# Patient Record
Sex: MALE | Race: WHITE | Employment: OTHER | URBAN - NONMETROPOLITAN AREA
[De-identification: names, ages, dates, MRNs, and addresses within clinical notes are randomized per-mention and may not be internally consistent; named-entity substitution may affect disease eponyms.]

---

## 2017-03-08 ENCOUNTER — OFFICE VISIT (OUTPATIENT)
Dept: GASTROENTEROLOGY | Age: 52
End: 2017-03-08
Payer: MEDICARE

## 2017-03-08 VITALS
DIASTOLIC BLOOD PRESSURE: 70 MMHG | HEIGHT: 69 IN | BODY MASS INDEX: 25.03 KG/M2 | OXYGEN SATURATION: 98 % | HEART RATE: 76 BPM | WEIGHT: 169 LBS | SYSTOLIC BLOOD PRESSURE: 110 MMHG

## 2017-03-08 DIAGNOSIS — R07.89 BURNING CHEST PAIN: Primary | ICD-10-CM

## 2017-03-08 DIAGNOSIS — Z86.19 HISTORY OF HELICOBACTER PYLORI INFECTION: ICD-10-CM

## 2017-03-08 DIAGNOSIS — R12 HEARTBURN: ICD-10-CM

## 2017-03-08 PROCEDURE — 99214 OFFICE O/P EST MOD 30 MIN: CPT | Performed by: NURSE PRACTITIONER

## 2017-03-08 PROCEDURE — G8427 DOCREV CUR MEDS BY ELIG CLIN: HCPCS | Performed by: NURSE PRACTITIONER

## 2017-03-08 PROCEDURE — G8420 CALC BMI NORM PARAMETERS: HCPCS | Performed by: NURSE PRACTITIONER

## 2017-03-08 PROCEDURE — G8484 FLU IMMUNIZE NO ADMIN: HCPCS | Performed by: NURSE PRACTITIONER

## 2017-03-08 PROCEDURE — 1036F TOBACCO NON-USER: CPT | Performed by: NURSE PRACTITIONER

## 2017-03-08 RX ORDER — LEVOTHYROXINE SODIUM 0.05 MG/1
50 TABLET ORAL DAILY
COMMUNITY
End: 2018-06-15 | Stop reason: CLARIF

## 2017-03-08 RX ORDER — AMITRIPTYLINE HYDROCHLORIDE 10 MG/1
10 TABLET, FILM COATED ORAL NIGHTLY
COMMUNITY
End: 2018-06-15 | Stop reason: CLARIF

## 2017-03-08 RX ORDER — METOPROLOL SUCCINATE 50 MG/1
50 TABLET, EXTENDED RELEASE ORAL DAILY
COMMUNITY

## 2017-03-08 RX ORDER — GABAPENTIN 100 MG/1
100 CAPSULE ORAL 2 TIMES DAILY
COMMUNITY
End: 2018-06-15 | Stop reason: CLARIF

## 2017-03-08 ASSESSMENT — ENCOUNTER SYMPTOMS
COUGH: 0
SHORTNESS OF BREATH: 0
EYES NEGATIVE: 1
BACK PAIN: 0
DIARRHEA: 0
VOICE CHANGE: 0
CONSTIPATION: 1
CHEST TIGHTNESS: 0
ALLERGIC/IMMUNOLOGIC NEGATIVE: 1
NAUSEA: 0
RECTAL PAIN: 0
ABDOMINAL PAIN: 0
SORE THROAT: 0
VOMITING: 0
BLOOD IN STOOL: 0

## 2017-03-28 ENCOUNTER — ANESTHESIA (OUTPATIENT)
Dept: ENDOSCOPY | Age: 52
End: 2017-03-28
Payer: MEDICARE

## 2017-03-28 ENCOUNTER — ANESTHESIA EVENT (OUTPATIENT)
Dept: ENDOSCOPY | Age: 52
End: 2017-03-28
Payer: MEDICARE

## 2017-03-28 ENCOUNTER — HOSPITAL ENCOUNTER (OUTPATIENT)
Age: 52
Setting detail: OUTPATIENT SURGERY
Discharge: HOME | End: 2017-03-28
Attending: INTERNAL MEDICINE | Admitting: INTERNAL MEDICINE
Payer: MEDICARE

## 2017-03-28 VITALS
HEART RATE: 69 BPM | BODY MASS INDEX: 23.7 KG/M2 | SYSTOLIC BLOOD PRESSURE: 127 MMHG | WEIGHT: 160 LBS | HEIGHT: 69 IN | OXYGEN SATURATION: 100 % | TEMPERATURE: 97.2 F | DIASTOLIC BLOOD PRESSURE: 84 MMHG | RESPIRATION RATE: 16 BRPM

## 2017-03-28 VITALS
OXYGEN SATURATION: 96 % | RESPIRATION RATE: 19 BRPM | DIASTOLIC BLOOD PRESSURE: 84 MMHG | SYSTOLIC BLOOD PRESSURE: 118 MMHG

## 2017-03-28 PROCEDURE — 87077 CULTURE AEROBIC IDENTIFY: CPT

## 2017-03-28 PROCEDURE — 7100000011 HC PHASE II RECOVERY - ADDTL 15 MIN: Performed by: INTERNAL MEDICINE

## 2017-03-28 PROCEDURE — 2580000003 HC RX 258: Performed by: INTERNAL MEDICINE

## 2017-03-28 PROCEDURE — 6360000002 HC RX W HCPCS: Performed by: NURSE ANESTHETIST, CERTIFIED REGISTERED

## 2017-03-28 PROCEDURE — 2500000003 HC RX 250 WO HCPCS: Performed by: NURSE ANESTHETIST, CERTIFIED REGISTERED

## 2017-03-28 PROCEDURE — 7100000010 HC PHASE II RECOVERY - FIRST 15 MIN: Performed by: INTERNAL MEDICINE

## 2017-03-28 PROCEDURE — 3700000000 HC ANESTHESIA ATTENDED CARE: Performed by: INTERNAL MEDICINE

## 2017-03-28 PROCEDURE — 43239 EGD BIOPSY SINGLE/MULTIPLE: CPT | Performed by: INTERNAL MEDICINE

## 2017-03-28 PROCEDURE — 2500000003 HC RX 250 WO HCPCS: Performed by: INTERNAL MEDICINE

## 2017-03-28 PROCEDURE — 3609012400 HC EGD TRANSORAL BIOPSY SINGLE/MULTIPLE: Performed by: INTERNAL MEDICINE

## 2017-03-28 RX ORDER — SODIUM CHLORIDE, SODIUM LACTATE, POTASSIUM CHLORIDE, CALCIUM CHLORIDE 600; 310; 30; 20 MG/100ML; MG/100ML; MG/100ML; MG/100ML
INJECTION, SOLUTION INTRAVENOUS CONTINUOUS
Status: DISCONTINUED | OUTPATIENT
Start: 2017-03-28 | End: 2017-03-28 | Stop reason: HOSPADM

## 2017-03-28 RX ORDER — LIDOCAINE HYDROCHLORIDE 10 MG/ML
1 INJECTION, SOLUTION EPIDURAL; INFILTRATION; INTRACAUDAL; PERINEURAL ONCE
Status: COMPLETED | OUTPATIENT
Start: 2017-03-28 | End: 2017-03-28

## 2017-03-28 RX ORDER — ONDANSETRON 2 MG/ML
4 INJECTION INTRAMUSCULAR; INTRAVENOUS
Status: DISCONTINUED | OUTPATIENT
Start: 2017-03-28 | End: 2017-03-28 | Stop reason: HOSPADM

## 2017-03-28 RX ORDER — ESOMEPRAZOLE MAGNESIUM 40 MG/1
40 CAPSULE, DELAYED RELEASE ORAL
Qty: 30 CAPSULE | Refills: 5 | Status: SHIPPED | OUTPATIENT
Start: 2017-03-28 | End: 2018-06-15 | Stop reason: CLARIF

## 2017-03-28 RX ORDER — PROPOFOL 10 MG/ML
INJECTION, EMULSION INTRAVENOUS PRN
Status: DISCONTINUED | OUTPATIENT
Start: 2017-03-28 | End: 2017-03-28 | Stop reason: SDUPTHER

## 2017-03-28 RX ORDER — DIPHENHYDRAMINE HYDROCHLORIDE 50 MG/ML
12.5 INJECTION INTRAMUSCULAR; INTRAVENOUS
Status: DISCONTINUED | OUTPATIENT
Start: 2017-03-28 | End: 2017-03-28 | Stop reason: HOSPADM

## 2017-03-28 RX ORDER — PROMETHAZINE HYDROCHLORIDE 25 MG/ML
6.25 INJECTION, SOLUTION INTRAMUSCULAR; INTRAVENOUS
Status: DISCONTINUED | OUTPATIENT
Start: 2017-03-28 | End: 2017-03-28 | Stop reason: HOSPADM

## 2017-03-28 RX ORDER — LIDOCAINE HYDROCHLORIDE 10 MG/ML
INJECTION, SOLUTION EPIDURAL; INFILTRATION; INTRACAUDAL; PERINEURAL PRN
Status: DISCONTINUED | OUTPATIENT
Start: 2017-03-28 | End: 2017-03-28 | Stop reason: SDUPTHER

## 2017-03-28 RX ORDER — MIDAZOLAM HYDROCHLORIDE 1 MG/ML
INJECTION INTRAMUSCULAR; INTRAVENOUS PRN
Status: DISCONTINUED | OUTPATIENT
Start: 2017-03-28 | End: 2017-03-28 | Stop reason: SDUPTHER

## 2017-03-28 RX ADMIN — LIDOCAINE HYDROCHLORIDE 1 ML: 10 INJECTION, SOLUTION EPIDURAL; INFILTRATION; INTRACAUDAL; PERINEURAL at 07:45

## 2017-03-28 RX ADMIN — MIDAZOLAM HYDROCHLORIDE 2 MG: 1 INJECTION, SOLUTION INTRAMUSCULAR; INTRAVENOUS at 09:28

## 2017-03-28 RX ADMIN — SODIUM CHLORIDE, POTASSIUM CHLORIDE, SODIUM LACTATE AND CALCIUM CHLORIDE: 600; 310; 30; 20 INJECTION, SOLUTION INTRAVENOUS at 07:44

## 2017-03-28 RX ADMIN — PROPOFOL 160 MG: 10 INJECTION, EMULSION INTRAVENOUS at 09:29

## 2017-03-28 RX ADMIN — LIDOCAINE HYDROCHLORIDE 5 ML: 10 INJECTION, SOLUTION EPIDURAL; INFILTRATION; INTRACAUDAL; PERINEURAL at 09:29

## 2017-03-29 LAB — CLOTEST: NEGATIVE

## 2017-03-31 ENCOUNTER — TELEPHONE (OUTPATIENT)
Dept: GASTROENTEROLOGY | Age: 52
End: 2017-03-31

## 2017-10-06 ENCOUNTER — TRANSCRIBE ORDERS (OUTPATIENT)
Dept: ADMINISTRATIVE | Facility: HOSPITAL | Age: 52
End: 2017-10-06

## 2017-10-06 DIAGNOSIS — R10.12 ABDOMINAL PAIN, LEFT UPPER QUADRANT: Primary | ICD-10-CM

## 2017-10-12 ENCOUNTER — APPOINTMENT (OUTPATIENT)
Dept: CT IMAGING | Facility: HOSPITAL | Age: 52
End: 2017-10-12

## 2017-10-12 ENCOUNTER — TRANSCRIBE ORDERS (OUTPATIENT)
Dept: ADMINISTRATIVE | Facility: HOSPITAL | Age: 52
End: 2017-10-12

## 2017-10-12 DIAGNOSIS — R10.12 ABDOMINAL PAIN, LEFT UPPER QUADRANT: Primary | ICD-10-CM

## 2017-10-13 ENCOUNTER — HOSPITAL ENCOUNTER (OUTPATIENT)
Dept: ULTRASOUND IMAGING | Facility: HOSPITAL | Age: 52
Discharge: HOME | End: 2017-10-13
Admitting: FAMILY MEDICINE

## 2017-10-13 DIAGNOSIS — R10.12 ABDOMINAL PAIN, LEFT UPPER QUADRANT: ICD-10-CM

## 2017-10-13 PROCEDURE — 76700 US EXAM ABDOM COMPLETE: CPT

## 2017-11-27 ENCOUNTER — TRANSCRIBE ORDERS (OUTPATIENT)
Dept: ADMINISTRATIVE | Facility: HOSPITAL | Age: 52
End: 2017-11-27

## 2017-11-27 ENCOUNTER — HOSPITAL ENCOUNTER (OUTPATIENT)
Dept: GENERAL RADIOLOGY | Facility: HOSPITAL | Age: 52
Discharge: HOME | End: 2017-11-27
Admitting: NURSE PRACTITIONER

## 2017-11-27 ENCOUNTER — HOSPITAL ENCOUNTER (OUTPATIENT)
Dept: MAMMOGRAPHY | Facility: HOSPITAL | Age: 52
Discharge: HOME | End: 2017-11-27

## 2017-11-27 ENCOUNTER — LAB (OUTPATIENT)
Dept: LAB | Facility: HOSPITAL | Age: 52
End: 2017-11-27

## 2017-11-27 ENCOUNTER — HOSPITAL ENCOUNTER (OUTPATIENT)
Dept: ULTRASOUND IMAGING | Facility: HOSPITAL | Age: 52
Discharge: HOME | End: 2017-11-27

## 2017-11-27 DIAGNOSIS — R07.89 OTHER CHEST PAIN: Primary | ICD-10-CM

## 2017-11-27 DIAGNOSIS — R07.89 OTHER CHEST PAIN: ICD-10-CM

## 2017-11-27 DIAGNOSIS — N63.0 BREAST LUMP OR MASS: ICD-10-CM

## 2017-11-27 DIAGNOSIS — N63.0 BREAST LUMP OR MASS: Primary | ICD-10-CM

## 2017-11-27 LAB
ALBUMIN SERPL-MCNC: 4.4 G/DL (ref 3.5–5)
ALBUMIN/GLOB SERPL: 1.4 G/DL (ref 1.1–2.5)
ALP SERPL-CCNC: 30 U/L (ref 24–120)
ALT SERPL W P-5'-P-CCNC: 40 U/L (ref 0–54)
ANION GAP SERPL CALCULATED.3IONS-SCNC: 11 MMOL/L (ref 4–13)
AST SERPL-CCNC: 30 U/L (ref 7–45)
AUTO MIXED CELLS #: 0.5 10*3/MM3 (ref 0.1–2.6)
AUTO MIXED CELLS %: 9.2 % (ref 0.1–24)
BILIRUB SERPL-MCNC: 0.3 MG/DL (ref 0.1–1)
BUN BLD-MCNC: 15 MG/DL (ref 5–21)
BUN/CREAT SERPL: 13.8
CALCIUM SPEC-SCNC: 10.1 MG/DL (ref 8.4–10.4)
CHLORIDE SERPL-SCNC: 100 MMOL/L (ref 98–110)
CHOLEST SERPL-MCNC: 170 MG/DL (ref 130–200)
CO2 SERPL-SCNC: 31 MMOL/L (ref 24–31)
CREAT BLD-MCNC: 1.09 MG/DL (ref 0.5–1.4)
CRP SERPL-MCNC: <0.5 MG/DL (ref 0–0.99)
ERYTHROCYTE [DISTWIDTH] IN BLOOD BY AUTOMATED COUNT: 12.5 % (ref 12–15)
GFR SERPL CREATININE-BSD FRML MDRD: 72 ML/MIN/1.73
GLOBULIN UR ELPH-MCNC: 3.1 GM/DL
GLUCOSE BLD-MCNC: 91 MG/DL (ref 70–100)
HCT VFR BLD AUTO: 42.3 % (ref 40–52)
HDLC SERPL-MCNC: 55 MG/DL
HGB BLD-MCNC: 14.4 G/DL (ref 14–18)
LDLC SERPL CALC-MCNC: 84 MG/DL (ref 0–99)
LDLC/HDLC SERPL: 1.52 {RATIO}
LYMPHOCYTES # BLD AUTO: 1.7 10*3/MM3 (ref 0.8–7)
LYMPHOCYTES NFR BLD AUTO: 30.4 % (ref 15–45)
MCH RBC QN AUTO: 29.7 PG (ref 28–32)
MCHC RBC AUTO-ENTMCNC: 34 G/DL (ref 33–36)
MCV RBC AUTO: 87.2 FL (ref 82–95)
NEUTROPHILS # BLD AUTO: 3.5 10*3/MM3 (ref 1.5–8.3)
NEUTROPHILS NFR BLD AUTO: 60.4 % (ref 39–78)
PLATELET # BLD AUTO: 269 10*3/MM3 (ref 130–400)
PMV BLD AUTO: 8.6 FL (ref 6–12)
POTASSIUM BLD-SCNC: 5.2 MMOL/L (ref 3.5–5.3)
PROT SERPL-MCNC: 7.5 G/DL (ref 6.3–8.7)
RBC # BLD AUTO: 4.85 10*6/MM3 (ref 4.2–5.4)
SODIUM BLD-SCNC: 142 MMOL/L (ref 135–145)
TRIGL SERPL-MCNC: 157 MG/DL (ref 0–149)
TROPONIN I SERPL-MCNC: <0.012 NG/ML (ref 0–0.03)
VLDLC SERPL-MCNC: 31.4 MG/DL
WBC NRBC COR # BLD: 5.7 10*3/MM3 (ref 4.8–10.8)

## 2017-11-27 PROCEDURE — G0204 DX MAMMO INCL CAD BI: HCPCS

## 2017-11-27 PROCEDURE — 84484 ASSAY OF TROPONIN QUANT: CPT | Performed by: NURSE PRACTITIONER

## 2017-11-27 PROCEDURE — 71020 HC CHEST PA AND LATERAL: CPT

## 2017-11-27 PROCEDURE — 80053 COMPREHEN METABOLIC PANEL: CPT | Performed by: NURSE PRACTITIONER

## 2017-11-27 PROCEDURE — 36415 COLL VENOUS BLD VENIPUNCTURE: CPT

## 2017-11-27 PROCEDURE — 80061 LIPID PANEL: CPT

## 2017-11-27 PROCEDURE — G0279 TOMOSYNTHESIS, MAMMO: HCPCS

## 2017-11-27 PROCEDURE — 85025 COMPLETE CBC W/AUTO DIFF WBC: CPT | Performed by: NURSE PRACTITIONER

## 2017-11-27 PROCEDURE — 86140 C-REACTIVE PROTEIN: CPT | Performed by: NURSE PRACTITIONER

## 2017-11-28 ENCOUNTER — TRANSCRIBE ORDERS (OUTPATIENT)
Dept: ADMINISTRATIVE | Facility: HOSPITAL | Age: 52
End: 2017-11-28

## 2017-11-28 ENCOUNTER — TRANSCRIBE ORDERS (OUTPATIENT)
Dept: GENERAL RADIOLOGY | Facility: HOSPITAL | Age: 52
End: 2017-11-28

## 2017-11-28 DIAGNOSIS — R07.89 OTHER CHEST PAIN: Primary | ICD-10-CM

## 2017-11-30 ENCOUNTER — HOSPITAL ENCOUNTER (OUTPATIENT)
Dept: CARDIOLOGY | Facility: HOSPITAL | Age: 52
Discharge: HOME | End: 2017-11-30
Attending: PEDIATRICS | Admitting: NURSE PRACTITIONER

## 2017-11-30 DIAGNOSIS — R07.89 OTHER CHEST PAIN: ICD-10-CM

## 2017-11-30 LAB
BH CV STRESS BP STAGE 1: NORMAL
BH CV STRESS BP STAGE 2: NORMAL
BH CV STRESS BP STAGE 3: NORMAL
BH CV STRESS BP STAGE 4: NORMAL
BH CV STRESS DURATION MIN STAGE 1: 3
BH CV STRESS DURATION MIN STAGE 2: 3
BH CV STRESS DURATION MIN STAGE 3: 3
BH CV STRESS DURATION MIN STAGE 4: 0
BH CV STRESS DURATION SEC STAGE 1: 0
BH CV STRESS DURATION SEC STAGE 2: 0
BH CV STRESS DURATION SEC STAGE 3: 0
BH CV STRESS DURATION SEC STAGE 4: 38
BH CV STRESS GRADE STAGE 1: 10
BH CV STRESS GRADE STAGE 2: 12
BH CV STRESS GRADE STAGE 3: 14
BH CV STRESS GRADE STAGE 4: 16
BH CV STRESS HR STAGE 1: 98
BH CV STRESS HR STAGE 2: 112
BH CV STRESS HR STAGE 3: 133
BH CV STRESS HR STAGE 4: 162
BH CV STRESS METS STAGE 1: 5
BH CV STRESS METS STAGE 2: 7.5
BH CV STRESS METS STAGE 3: 10
BH CV STRESS METS STAGE 4: 13.5
BH CV STRESS PROTOCOL 1: NORMAL
BH CV STRESS RECOVERY BP: NORMAL MMHG
BH CV STRESS RECOVERY HR: 76 BPM
BH CV STRESS SPEED STAGE 1: 1.7
BH CV STRESS SPEED STAGE 2: 2.5
BH CV STRESS SPEED STAGE 3: 3.4
BH CV STRESS SPEED STAGE 4: 4.2
BH CV STRESS STAGE 1: 1
BH CV STRESS STAGE 2: 2
BH CV STRESS STAGE 3: 3
BH CV STRESS STAGE 4: 4
MAXIMAL PREDICTED HEART RATE: 170 BPM
PERCENT MAX PREDICTED HR: 95.29 %
STRESS BASELINE BP: NORMAL MMHG
STRESS BASELINE HR: 64 BPM
STRESS PERCENT HR: 112 %
STRESS POST ESTIMATED WORKLOAD: 13.5 METS
STRESS POST EXERCISE DUR MIN: 9 MIN
STRESS POST EXERCISE DUR SEC: 38 SEC
STRESS POST PEAK BP: NORMAL MMHG
STRESS POST PEAK HR: 162 BPM
STRESS TARGET HR: 145 BPM

## 2017-11-30 PROCEDURE — 93017 CV STRESS TEST TRACING ONLY: CPT

## 2017-11-30 PROCEDURE — 93018 CV STRESS TEST I&R ONLY: CPT | Performed by: INTERNAL MEDICINE

## 2018-02-24 ENCOUNTER — TELEPHONE (OUTPATIENT)
Dept: PRIMARY CARE CLINIC | Age: 53
End: 2018-02-24

## 2018-03-20 ENCOUNTER — TELEPHONE (OUTPATIENT)
Dept: UROLOGY | Facility: CLINIC | Age: 53
End: 2018-03-20

## 2018-03-20 DIAGNOSIS — N20.0 KIDNEY STONE: Primary | ICD-10-CM

## 2018-03-21 NOTE — PROGRESS NOTES
Subjective    Mr. iSmms is 52 y.o. male    Chief Complaint: Renal Cyst/Kidney Stone    History of Present Illness  Urolithiasis  Patient complains of left abdominal pain with and without radiation to the abdomen. Onset of symptoms was gradual starting several months ago with unchanged course since that time. Patient describes the pain as aching, occasionally and rated as mild. The patient has had no nausea and no vomiting. There has been no fever or chills. The patient is not complaining of dysuria, frequency, or urgency.  Previous management of stones includes none      The following portions of the patient's history were reviewed and updated as appropriate: allergies, current medications, past family history, past medical history, past social history, past surgical history and problem list.    Review of Systems   Constitutional: Negative for chills and fever.   Gastrointestinal: Negative for abdominal pain, anal bleeding and blood in stool.   Genitourinary: Negative for decreased urine volume, difficulty urinating, discharge, dysuria, enuresis, flank pain, frequency, genital sores, hematuria, penile pain, penile swelling, scrotal swelling, testicular pain and urgency.         Current Outpatient Prescriptions:   •  fenofibrate (TRICOR) 145 MG tablet, Take 145 mg by mouth Daily., Disp: , Rfl:   •  metoprolol tartrate (LOPRESSOR) 100 MG tablet, Take 100 mg by mouth 2 (Two) Times a Day., Disp: , Rfl:   •  Omega-3 Fatty Acids (FISH OIL) 1000 MG capsule capsule, Take  by mouth Daily With Breakfast., Disp: , Rfl:   •  pantoprazole (PROTONIX) 40 MG EC tablet, Take 40 mg by mouth Daily., Disp: , Rfl:     Past Medical History:   Diagnosis Date   • Hypertension        Past Surgical History:   Procedure Laterality Date   • GALLBLADDER SURGERY     • TONSILLECTOMY         Social History     Social History   • Marital status: Single     Social History Main Topics   • Smoking status: Former Smoker   • Smokeless tobacco: Never  "Used   • Alcohol use Defer   • Drug use: Unknown   • Sexual activity: Defer     Other Topics Concern   • Not on file       Family History   Problem Relation Age of Onset   • No Known Problems Mother    • No Known Problems Father    • No Known Problems Sister    • No Known Problems Brother    • No Known Problems Daughter    • No Known Problems Son    • Heart disease Maternal Grandmother    • No Known Problems Paternal Grandmother    • No Known Problems Maternal Aunt    • No Known Problems Paternal Aunt    • BRCA 1/2 Neg Hx    • Breast cancer Neg Hx    • Colon cancer Neg Hx    • Endometrial cancer Neg Hx    • Ovarian cancer Neg Hx        Objective    Temp 97.8 °F (36.6 °C)   Ht 175.3 cm (69\")   Wt 76.7 kg (169 lb)   BMI 24.96 kg/m²     Physical Exam   Constitutional: He is oriented to person, place, and time. He appears well-developed and well-nourished. No distress.   Pulmonary/Chest: Effort normal.   Abdominal: Soft. He exhibits no distension and no mass. There is no tenderness. There is no rebound and no guarding. No hernia.   Neurological: He is alert and oriented to person, place, and time.   Skin: Skin is warm and dry. He is not diaphoretic.   Psychiatric: He has a normal mood and affect.   Vitals reviewed.          Results for orders placed or performed in visit on 03/23/18   POC Urinalysis Dipstick, Automated   Result Value Ref Range    Color Yellow Yellow, Straw, Dark Yellow, Anai    Clarity, UA Clear Clear    Glucose, UA Negative Negative, 1000 mg/dL (3+) mg/dL    Bilirubin Negative Negative    Ketones, UA Negative Negative    Specific Gravity  1.025 1.005 - 1.030    Blood, UA Negative Negative    pH, Urine 5.5 5.0 - 8.0    Protein, POC Negative Negative mg/dL    Urobilinogen, UA Normal Normal    Leukocytes Negative Negative    Nitrite, UA Negative Negative   KUB independent review    A KUB is available for me to review today.  The image is inspected for a bowel gas pattern and the general bone structure " of the spine and pelvis. The kidneys are then inspected closely.  Renal outline is noted if identifiable. The kidney, collecting system, and anticipated path of the ureter are examined for calcifications including those in the true pelvis.  This film reveals:    On the right there are single calcification right upper pole.    On the left there are no calcificaitons seen in the kidney or the expected course of the ureter. .    Renal ultrasound independent review    The renal ultrasound is available for me to review.  Treatment recommendations require an independent review.  This film has been reviewed by the radiologist to determine any non urologic abnormalities that are presents.  However, I very closely inspected the kidneys for size, symmetry, contour, parenchymal thickness, perinephric reaction, presence of calcifications, and intrarenal dilation of the collecting system.       The right kidney appears normal on this ultrasound.  The renal parenchymal is norml in thickness.  There are no solid masses or cysts.  There is no hydronephrosis.  There are no stones.      The left kidney appears simple renal cyst    The bladder appears normal on thisultrsaound.  The bladder appears normal in thickness.  There no masses or stones seen on this exam.         Assessment and Plan    Diagnoses and all orders for this visit:    Renal cyst  -     POC Urinalysis Dipstick, Automated    Kidney stone  -     POC Urinalysis Dipstick, Automated          Patient was last seen in August 2015 at that point he had a right 4 mm nonobstructing stone.  I personally reviewed his KUB today I see stable stone disease.  In addition I reviewed his renal ultrasound which shows no hydronephrosis on the right stone is not visible on the ultrasound.  He does have a simple renal cyst on the left which requires no further follow-up.    He is going to follow-up with me in 1 year with KUB if he does have interval growth and we will discuss potential for  ESWL.

## 2018-03-23 ENCOUNTER — HOSPITAL ENCOUNTER (OUTPATIENT)
Dept: GENERAL RADIOLOGY | Facility: HOSPITAL | Age: 53
Discharge: HOME | End: 2018-03-23
Attending: UROLOGY | Admitting: UROLOGY

## 2018-03-23 ENCOUNTER — OFFICE VISIT (OUTPATIENT)
Dept: UROLOGY | Facility: CLINIC | Age: 53
End: 2018-03-23

## 2018-03-23 VITALS — TEMPERATURE: 97.8 F | WEIGHT: 169 LBS | HEIGHT: 69 IN | BODY MASS INDEX: 25.03 KG/M2

## 2018-03-23 DIAGNOSIS — N20.0 NEPHROLITHIASIS: ICD-10-CM

## 2018-03-23 DIAGNOSIS — N28.1 RENAL CYST: Primary | ICD-10-CM

## 2018-03-23 LAB
BILIRUB BLD-MCNC: NEGATIVE MG/DL
CLARITY, POC: CLEAR
COLOR UR: YELLOW
GLUCOSE UR STRIP-MCNC: NEGATIVE MG/DL
KETONES UR QL: NEGATIVE
LEUKOCYTE EST, POC: NEGATIVE
NITRITE UR-MCNC: NEGATIVE MG/ML
PH UR: 5.5 [PH] (ref 5–8)
PROT UR STRIP-MCNC: NEGATIVE MG/DL
RBC # UR STRIP: NEGATIVE /UL
SP GR UR: 1.02 (ref 1–1.03)
UROBILINOGEN UR QL: NORMAL

## 2018-03-23 PROCEDURE — 81001 URINALYSIS AUTO W/SCOPE: CPT | Performed by: UROLOGY

## 2018-03-23 PROCEDURE — 74018 RADEX ABDOMEN 1 VIEW: CPT

## 2018-03-23 PROCEDURE — 99213 OFFICE O/P EST LOW 20 MIN: CPT | Performed by: UROLOGY

## 2018-03-23 RX ORDER — FENOFIBRATE 160 MG/1
160 TABLET ORAL NIGHTLY
COMMUNITY

## 2018-03-23 RX ORDER — METOPROLOL TARTRATE 100 MG/1
100 TABLET ORAL DAILY
Status: ON HOLD | COMMUNITY
End: 2021-11-15

## 2018-03-23 RX ORDER — PANTOPRAZOLE SODIUM 40 MG/1
40 TABLET, DELAYED RELEASE ORAL NIGHTLY
COMMUNITY
End: 2023-01-04

## 2018-03-23 RX ORDER — CHLORAL HYDRATE 500 MG
CAPSULE ORAL
COMMUNITY
End: 2021-03-29

## 2018-06-13 DIAGNOSIS — Z00.00 ROUTINE GENERAL MEDICAL EXAMINATION AT A HEALTH CARE FACILITY: ICD-10-CM

## 2018-06-13 DIAGNOSIS — Z12.5 SCREENING PSA (PROSTATE SPECIFIC ANTIGEN): Primary | ICD-10-CM

## 2018-06-14 DIAGNOSIS — Z00.00 ROUTINE GENERAL MEDICAL EXAMINATION AT A HEALTH CARE FACILITY: ICD-10-CM

## 2018-06-14 DIAGNOSIS — Z12.5 SCREENING PSA (PROSTATE SPECIFIC ANTIGEN): ICD-10-CM

## 2018-06-14 LAB
ALBUMIN SERPL-MCNC: 4.6 G/DL (ref 3.5–5.2)
ALP BLD-CCNC: 30 U/L (ref 40–130)
ALT SERPL-CCNC: 17 U/L (ref 5–41)
ANION GAP SERPL CALCULATED.3IONS-SCNC: 11 MMOL/L (ref 7–19)
AST SERPL-CCNC: 22 U/L (ref 5–40)
BASOPHILS ABSOLUTE: 0.1 K/UL (ref 0–0.2)
BASOPHILS RELATIVE PERCENT: 0.9 % (ref 0–1)
BILIRUB SERPL-MCNC: 0.4 MG/DL (ref 0.2–1.2)
BUN BLDV-MCNC: 16 MG/DL (ref 6–20)
CALCIUM SERPL-MCNC: 9.7 MG/DL (ref 8.6–10)
CHLORIDE BLD-SCNC: 102 MMOL/L (ref 98–111)
CHOLESTEROL, TOTAL: 146 MG/DL (ref 160–199)
CO2: 28 MMOL/L (ref 22–29)
CREAT SERPL-MCNC: 1.2 MG/DL (ref 0.5–1.2)
EOSINOPHILS ABSOLUTE: 0.1 K/UL (ref 0–0.6)
EOSINOPHILS RELATIVE PERCENT: 1.9 % (ref 0–5)
GFR NON-AFRICAN AMERICAN: >60
GLUCOSE BLD-MCNC: 80 MG/DL (ref 74–109)
HCT VFR BLD CALC: 41.7 % (ref 42–52)
HDLC SERPL-MCNC: 47 MG/DL (ref 55–121)
HEMOGLOBIN: 14 G/DL (ref 14–18)
LDL CHOLESTEROL CALCULATED: 80 MG/DL
LYMPHOCYTES ABSOLUTE: 1.7 K/UL (ref 1.1–4.5)
LYMPHOCYTES RELATIVE PERCENT: 31.2 % (ref 20–40)
MCH RBC QN AUTO: 29.7 PG (ref 27–31)
MCHC RBC AUTO-ENTMCNC: 33.6 G/DL (ref 33–37)
MCV RBC AUTO: 88.5 FL (ref 80–94)
MONOCYTES ABSOLUTE: 0.5 K/UL (ref 0–0.9)
MONOCYTES RELATIVE PERCENT: 9 % (ref 0–10)
NEUTROPHILS ABSOLUTE: 3 K/UL (ref 1.5–7.5)
NEUTROPHILS RELATIVE PERCENT: 56.6 % (ref 50–65)
PDW BLD-RTO: 12 % (ref 11.5–14.5)
PLATELET # BLD: 244 K/UL (ref 130–400)
PMV BLD AUTO: 9.6 FL (ref 9.4–12.4)
POTASSIUM SERPL-SCNC: 3.9 MMOL/L (ref 3.5–5)
PROSTATE SPECIFIC ANTIGEN: 0.62 NG/ML (ref 0–4)
RBC # BLD: 4.71 M/UL (ref 4.7–6.1)
SODIUM BLD-SCNC: 141 MMOL/L (ref 136–145)
T4 FREE: 1.2 NG/DL (ref 0.9–1.7)
TOTAL PROTEIN: 6.8 G/DL (ref 6.6–8.7)
TRIGL SERPL-MCNC: 96 MG/DL (ref 0–149)
TSH SERPL DL<=0.05 MIU/L-ACNC: 10.49 UIU/ML (ref 0.27–4.2)
WBC # BLD: 5.4 K/UL (ref 4.8–10.8)

## 2018-07-03 ENCOUNTER — HOSPITAL ENCOUNTER (EMERGENCY)
Facility: HOSPITAL | Age: 53
Discharge: HOME | End: 2018-07-04
Attending: EMERGENCY MEDICINE | Admitting: EMERGENCY MEDICINE

## 2018-07-03 DIAGNOSIS — R10.84 GENERALIZED ABDOMINAL PAIN: Primary | ICD-10-CM

## 2018-07-03 LAB
ALBUMIN SERPL-MCNC: 4.8 G/DL (ref 3.5–5)
ALBUMIN/GLOB SERPL: 1.7 G/DL (ref 1.1–2.5)
ALP SERPL-CCNC: 31 U/L (ref 24–120)
ALT SERPL W P-5'-P-CCNC: 42 U/L (ref 0–54)
ANION GAP SERPL CALCULATED.3IONS-SCNC: 16 MMOL/L (ref 4–13)
AST SERPL-CCNC: 38 U/L (ref 7–45)
BASOPHILS # BLD AUTO: 0.03 10*3/MM3 (ref 0–0.2)
BASOPHILS NFR BLD AUTO: 0.3 % (ref 0–2)
BILIRUB SERPL-MCNC: 0.5 MG/DL (ref 0.1–1)
BUN BLD-MCNC: 26 MG/DL (ref 5–21)
BUN/CREAT SERPL: 23.6 (ref 7–25)
CALCIUM SPEC-SCNC: 9.3 MG/DL (ref 8.4–10.4)
CHLORIDE SERPL-SCNC: 100 MMOL/L (ref 98–110)
CO2 SERPL-SCNC: 27 MMOL/L (ref 24–31)
CREAT BLD-MCNC: 1.1 MG/DL (ref 0.5–1.4)
D-LACTATE SERPL-SCNC: 2.4 MMOL/L (ref 0.5–2)
DEPRECATED RDW RBC AUTO: 37.7 FL (ref 40–54)
EOSINOPHIL # BLD AUTO: 0.02 10*3/MM3 (ref 0–0.7)
EOSINOPHIL NFR BLD AUTO: 0.2 % (ref 0–4)
ERYTHROCYTE [DISTWIDTH] IN BLOOD BY AUTOMATED COUNT: 12.2 % (ref 12–15)
GFR SERPL CREATININE-BSD FRML MDRD: 71 ML/MIN/1.73
GLOBULIN UR ELPH-MCNC: 2.8 GM/DL
GLUCOSE BLD-MCNC: 122 MG/DL (ref 70–100)
HCT VFR BLD AUTO: 45.9 % (ref 40–52)
HGB BLD-MCNC: 15.6 G/DL (ref 14–18)
IMM GRANULOCYTES # BLD: 0.05 10*3/MM3 (ref 0–0.03)
IMM GRANULOCYTES NFR BLD: 0.5 % (ref 0–5)
LIPASE SERPL-CCNC: 38 U/L (ref 23–203)
LYMPHOCYTES # BLD AUTO: 0.48 10*3/MM3 (ref 0.72–4.86)
LYMPHOCYTES NFR BLD AUTO: 4.6 % (ref 15–45)
MCH RBC QN AUTO: 28.9 PG (ref 28–32)
MCHC RBC AUTO-ENTMCNC: 34 G/DL (ref 33–36)
MCV RBC AUTO: 85.2 FL (ref 82–95)
MONOCYTES # BLD AUTO: 0.54 10*3/MM3 (ref 0.19–1.3)
MONOCYTES NFR BLD AUTO: 5.1 % (ref 4–12)
NEUTROPHILS # BLD AUTO: 9.39 10*3/MM3 (ref 1.87–8.4)
NEUTROPHILS NFR BLD AUTO: 89.3 % (ref 39–78)
NRBC BLD MANUAL-RTO: 0 /100 WBC (ref 0–0)
PLATELET # BLD AUTO: 272 10*3/MM3 (ref 130–400)
PMV BLD AUTO: 9.5 FL (ref 6–12)
POTASSIUM BLD-SCNC: 3.8 MMOL/L (ref 3.5–5.3)
PROT SERPL-MCNC: 7.6 G/DL (ref 6.3–8.7)
RBC # BLD AUTO: 5.39 10*6/MM3 (ref 4.8–5.9)
SODIUM BLD-SCNC: 143 MMOL/L (ref 135–145)
WBC NRBC COR # BLD: 10.51 10*3/MM3 (ref 4.8–10.8)

## 2018-07-03 PROCEDURE — 96374 THER/PROPH/DIAG INJ IV PUSH: CPT

## 2018-07-03 PROCEDURE — 25010000002 ONDANSETRON PER 1 MG: Performed by: EMERGENCY MEDICINE

## 2018-07-03 PROCEDURE — 99284 EMERGENCY DEPT VISIT MOD MDM: CPT

## 2018-07-03 PROCEDURE — 83690 ASSAY OF LIPASE: CPT | Performed by: EMERGENCY MEDICINE

## 2018-07-03 PROCEDURE — 36415 COLL VENOUS BLD VENIPUNCTURE: CPT

## 2018-07-03 PROCEDURE — 85025 COMPLETE CBC W/AUTO DIFF WBC: CPT | Performed by: EMERGENCY MEDICINE

## 2018-07-03 PROCEDURE — 80053 COMPREHEN METABOLIC PANEL: CPT | Performed by: EMERGENCY MEDICINE

## 2018-07-03 PROCEDURE — 83605 ASSAY OF LACTIC ACID: CPT | Performed by: EMERGENCY MEDICINE

## 2018-07-03 PROCEDURE — 96375 TX/PRO/DX INJ NEW DRUG ADDON: CPT

## 2018-07-03 PROCEDURE — 25010000002 MORPHINE PER 10 MG: Performed by: EMERGENCY MEDICINE

## 2018-07-03 PROCEDURE — 87040 BLOOD CULTURE FOR BACTERIA: CPT | Performed by: EMERGENCY MEDICINE

## 2018-07-03 RX ORDER — ONDANSETRON 2 MG/ML
4 INJECTION INTRAMUSCULAR; INTRAVENOUS ONCE
Status: COMPLETED | OUTPATIENT
Start: 2018-07-03 | End: 2018-07-03

## 2018-07-03 RX ORDER — SODIUM CHLORIDE 0.9 % (FLUSH) 0.9 %
10 SYRINGE (ML) INJECTION AS NEEDED
Status: DISCONTINUED | OUTPATIENT
Start: 2018-07-03 | End: 2018-07-04 | Stop reason: HOSPADM

## 2018-07-03 RX ORDER — MORPHINE SULFATE 4 MG/ML
4 INJECTION, SOLUTION INTRAMUSCULAR; INTRAVENOUS ONCE
Status: COMPLETED | OUTPATIENT
Start: 2018-07-03 | End: 2018-07-03

## 2018-07-03 RX ADMIN — ONDANSETRON 4 MG: 2 INJECTION, SOLUTION INTRAMUSCULAR; INTRAVENOUS at 23:25

## 2018-07-03 RX ADMIN — MORPHINE SULFATE 4 MG: 4 INJECTION INTRAVENOUS at 23:25

## 2018-07-03 RX ADMIN — SODIUM CHLORIDE 1000 ML: 9 INJECTION, SOLUTION INTRAVENOUS at 23:18

## 2018-07-04 ENCOUNTER — APPOINTMENT (OUTPATIENT)
Dept: CT IMAGING | Facility: HOSPITAL | Age: 53
End: 2018-07-04

## 2018-07-04 VITALS
BODY MASS INDEX: 24.88 KG/M2 | HEART RATE: 83 BPM | WEIGHT: 168 LBS | SYSTOLIC BLOOD PRESSURE: 107 MMHG | TEMPERATURE: 97.3 F | DIASTOLIC BLOOD PRESSURE: 75 MMHG | OXYGEN SATURATION: 99 % | HEIGHT: 69 IN | RESPIRATION RATE: 18 BRPM

## 2018-07-04 LAB
BACTERIA UR QL AUTO: ABNORMAL /HPF
BILIRUB UR QL STRIP: NEGATIVE
CLARITY UR: CLEAR
COLOR UR: ABNORMAL
GLUCOSE UR STRIP-MCNC: NEGATIVE MG/DL
HGB UR QL STRIP.AUTO: NEGATIVE
HOLD SPECIMEN: NORMAL
HYALINE CASTS UR QL AUTO: ABNORMAL /LPF
KETONES UR QL STRIP: ABNORMAL
LEUKOCYTE ESTERASE UR QL STRIP.AUTO: ABNORMAL
NITRITE UR QL STRIP: NEGATIVE
PH UR STRIP.AUTO: 6 [PH] (ref 5–8)
PROT UR QL STRIP: ABNORMAL
RBC # UR: ABNORMAL /HPF
REF LAB TEST METHOD: ABNORMAL
SP GR UR STRIP: >1.03 (ref 1–1.03)
SQUAMOUS #/AREA URNS HPF: ABNORMAL /HPF
UROBILINOGEN UR QL STRIP: ABNORMAL
WBC UR QL AUTO: ABNORMAL /HPF
WHOLE BLOOD HOLD SPECIMEN: NORMAL
WHOLE BLOOD HOLD SPECIMEN: NORMAL

## 2018-07-04 PROCEDURE — 87086 URINE CULTURE/COLONY COUNT: CPT | Performed by: EMERGENCY MEDICINE

## 2018-07-04 PROCEDURE — 74177 CT ABD & PELVIS W/CONTRAST: CPT

## 2018-07-04 PROCEDURE — 25010000002 IOPAMIDOL 61 % SOLUTION: Performed by: EMERGENCY MEDICINE

## 2018-07-04 PROCEDURE — 81001 URINALYSIS AUTO W/SCOPE: CPT | Performed by: EMERGENCY MEDICINE

## 2018-07-04 RX ADMIN — IOPAMIDOL 100 ML: 612 INJECTION, SOLUTION INTRAVENOUS at 00:02

## 2018-07-04 NOTE — ED PROVIDER NOTES
Subjective   History of Present Illness     53-year-old male complaining about lower abdominal pain since yesterday.  The patient denies nausea, vomiting, dysuria, penile pain, scrotal pain, constipation, diarrhea    Review of Systems   Constitutional: Negative for appetite change, chills, fatigue and fever.   HENT: Negative for congestion and sore throat.    Respiratory: Negative for chest tightness and shortness of breath.    Cardiovascular: Negative for palpitations.   Gastrointestinal: Negative for abdominal distention, abdominal pain, anal bleeding, blood in stool and vomiting.   Genitourinary: Negative for difficulty urinating and dysuria.   Musculoskeletal: Negative for back pain, joint swelling, neck pain and neck stiffness.   Skin: Negative for rash.   Neurological: Negative for dizziness and headaches.   Psychiatric/Behavioral: Negative for agitation, behavioral problems and confusion.   All other systems reviewed and are negative.      Past Medical History:   Diagnosis Date   • Hypertension        No Known Allergies    Past Surgical History:   Procedure Laterality Date   • GALLBLADDER SURGERY     • TONSILLECTOMY         Family History   Problem Relation Age of Onset   • No Known Problems Mother    • No Known Problems Father    • No Known Problems Sister    • No Known Problems Brother    • No Known Problems Daughter    • No Known Problems Son    • Heart disease Maternal Grandmother    • No Known Problems Paternal Grandmother    • No Known Problems Maternal Aunt    • No Known Problems Paternal Aunt    • BRCA 1/2 Neg Hx    • Breast cancer Neg Hx    • Colon cancer Neg Hx    • Endometrial cancer Neg Hx    • Ovarian cancer Neg Hx        Social History     Social History   • Marital status: Single     Social History Main Topics   • Smoking status: Former Smoker   • Smokeless tobacco: Never Used   • Alcohol use Defer   • Drug use: Unknown   • Sexual activity: Defer     Other Topics Concern   • Not on file            Objective   Physical Exam   Constitutional: He is oriented to person, place, and time. He appears well-developed and well-nourished.   HENT:   Head: Normocephalic and atraumatic.   Eyes: Conjunctivae are normal. Pupils are equal, round, and reactive to light.   Neck: Normal range of motion.   Cardiovascular: Normal rate, regular rhythm and normal heart sounds.    Pulmonary/Chest: Effort normal and breath sounds normal.   Abdominal: Soft. Bowel sounds are normal. He exhibits no distension and no mass. There is no tenderness. There is no rebound and no guarding. No hernia.   Musculoskeletal: Normal range of motion. He exhibits no edema or deformity.   Neurological: He is alert and oriented to person, place, and time. He has normal strength. He displays normal reflexes. No cranial nerve deficit or sensory deficit. He exhibits normal muscle tone. Coordination normal.   Skin: Skin is warm.   Psychiatric: He has a normal mood and affect. His behavior is normal.   Nursing note and vitals reviewed.      Procedures           ED Course  ED Course as of Jul 04 0218 Wed Jul 04, 2018   0017 Lactate, Venous: (!!) 2.4 [KP]   0017 Glucose: (!) 122 [KP]   0017 BUN: (!) 26 [KP]   0017 RDW-SD: (!) 37.7 [KP]   0017 Neutrophil %: (!) 89.3 [KP]   0018 Immature Grans, Absolute: (!) 0.05 [KP]   0018 Anion Gap: (!) 16.0 [KP]   0216 After seeing the radiologist comment about the 8 mm appendix I personally called stat read service and spoke to Dr. Narvaez.  He stated that the dilated appendix is likely reactive to the patient's other inflammation.  He does not believe that this patient has an acute appendicitis.  [KP]   0217 On discharge, patient is resting comfortably, tolerating PO, abdomen is soft, pulses are 2+ in all 4 extremities. Patient was advised to follow up with their physician or clinic. If symptoms worsen, patient was advised to return to the ER.    [KP]   0217 Information given regarding preliminary nature of  "imaging reading, with possibility that a finding not initially detected in the ED may be noticed on final reading, with subsequent notification.  [KP]   0217 Regarding the patient's leukocyte Estrace, I believe this is a contaminant.  He has squamous cells in his urine  [KP]      ED Course User Index  [KP] Jabier Enriquez MD      CT Abdomen Pelvis With Contrast   ED Interpretation   Stat read service red CAT scan as: \"Large amount of fluid throughout the small and large bowel, with areas of very mild wall thickening indicating enterocolitis      8 mm appendix without surrounding inflammation      No bowel obstruction                    MDM      Final diagnoses:   Generalized abdominal pain            Jabier Enriquez MD  07/04/18 0218    "

## 2018-07-04 NOTE — ED NOTES
Pt and family updated CT results.  No other concerns at this time     Faheem Lawson, VIRAL  07/04/18 0121

## 2018-07-04 NOTE — ED NOTES
Pt and family updated about delay.  Father at bedside wanting to speak with MD.  Dr. espinoza aware images are back     Faheem Lawson RN  07/04/18 1587

## 2018-07-04 NOTE — DISCHARGE INSTRUCTIONS

## 2018-07-06 LAB — BACTERIA SPEC AEROBE CULT: ABNORMAL

## 2018-07-08 LAB
BACTERIA SPEC AEROBE CULT: NORMAL
BACTERIA SPEC AEROBE CULT: NORMAL

## 2018-07-09 NOTE — ED NOTES
"ED Call Back Questions    1. How are you doing since leaving the Emergency Department?    Doing better, good er visit, no issues  2. Do you have any questions about your discharge instructions? No     3. Have you filled your new prescriptions yet? N/A  a. Do you have any questions about those medications? N/A    4. Were you able to make a follow-up appointment with the physician? Yes     5. Do you have a primary care physician? Yes   a. If No, would you like for me to set you up with one? N/A  i. If Yes, “I will have our ED  give you a call right back at this number to work with you on the best time for an appointment.”    6. We are always looking to get better at what we do. Do you have any suggestions for what we can do to be even better? No   a. If Yes, \"Thank you for sharing your concerns. I apologize. I will follow up with our manager and patient . Would you like someone to call you back?\" N/A    7. Is there anything else I can do for you? No     "

## 2018-11-12 ENCOUNTER — TRANSCRIBE ORDERS (OUTPATIENT)
Dept: ADMINISTRATIVE | Facility: HOSPITAL | Age: 53
End: 2018-11-12

## 2018-11-12 ENCOUNTER — HOSPITAL ENCOUNTER (OUTPATIENT)
Dept: CT IMAGING | Facility: HOSPITAL | Age: 53
Discharge: HOME | End: 2018-11-12

## 2018-11-12 ENCOUNTER — HOSPITAL ENCOUNTER (OUTPATIENT)
Dept: ULTRASOUND IMAGING | Facility: HOSPITAL | Age: 53
Discharge: HOME | End: 2018-11-12
Admitting: NURSE PRACTITIONER

## 2018-11-12 ENCOUNTER — TRANSCRIBE ORDERS (OUTPATIENT)
Dept: GENERAL RADIOLOGY | Facility: HOSPITAL | Age: 53
End: 2018-11-12

## 2018-11-12 DIAGNOSIS — R22.0 LOCALIZED SWELLING, MASS, AND LUMP OF HEAD: ICD-10-CM

## 2018-11-12 DIAGNOSIS — R22.0 LOCALIZED SWELLING, MASS, AND LUMP OF HEAD: Primary | ICD-10-CM

## 2018-11-12 PROCEDURE — 76536 US EXAM OF HEAD AND NECK: CPT

## 2018-11-12 PROCEDURE — 70450 CT HEAD/BRAIN W/O DYE: CPT

## 2019-03-21 ENCOUNTER — HOSPITAL ENCOUNTER (OUTPATIENT)
Dept: GENERAL RADIOLOGY | Facility: HOSPITAL | Age: 54
Discharge: HOME | End: 2019-03-21
Admitting: UROLOGY

## 2019-03-21 DIAGNOSIS — N20.0 NEPHROLITHIASIS: ICD-10-CM

## 2019-03-21 PROCEDURE — 74018 RADEX ABDOMEN 1 VIEW: CPT

## 2019-03-25 ENCOUNTER — OFFICE VISIT (OUTPATIENT)
Dept: UROLOGY | Facility: CLINIC | Age: 54
End: 2019-03-25

## 2019-03-25 VITALS — WEIGHT: 171.6 LBS | HEIGHT: 69 IN | BODY MASS INDEX: 25.42 KG/M2 | TEMPERATURE: 97.3 F

## 2019-03-25 DIAGNOSIS — N20.0 NEPHROLITHIASIS: Primary | ICD-10-CM

## 2019-03-25 LAB
BILIRUB BLD-MCNC: NEGATIVE MG/DL
CLARITY, POC: CLEAR
COLOR UR: YELLOW
GLUCOSE UR STRIP-MCNC: NEGATIVE MG/DL
KETONES UR QL: NEGATIVE
LEUKOCYTE EST, POC: ABNORMAL
NITRITE UR-MCNC: POSITIVE MG/ML
PH UR: 7 [PH] (ref 5–8)
PROT UR STRIP-MCNC: NEGATIVE MG/DL
RBC # UR STRIP: ABNORMAL /UL
SP GR UR: 1.02 (ref 1–1.03)
UROBILINOGEN UR QL: NORMAL

## 2019-03-25 PROCEDURE — 81001 URINALYSIS AUTO W/SCOPE: CPT | Performed by: NURSE PRACTITIONER

## 2019-03-25 PROCEDURE — 87186 SC STD MICRODIL/AGAR DIL: CPT | Performed by: NURSE PRACTITIONER

## 2019-03-25 PROCEDURE — 99213 OFFICE O/P EST LOW 20 MIN: CPT | Performed by: NURSE PRACTITIONER

## 2019-03-25 PROCEDURE — 87086 URINE CULTURE/COLONY COUNT: CPT | Performed by: NURSE PRACTITIONER

## 2019-03-25 PROCEDURE — 87077 CULTURE AEROBIC IDENTIFY: CPT | Performed by: NURSE PRACTITIONER

## 2019-03-25 NOTE — PROGRESS NOTES
Mr. Simms is 54 y.o. male    Chief Complaint   Patient presents with   • Nephrolithiasis       History of Present Illness  Renal Urolithiasis  The patient presents with right renal urolithiasis. This was initially diagnosed approximately 4 years ago. This is a(n) established problem for the patient. The onset of this was unknown. The course is stable. The patient is not currently being managed by dietary alterations. Current symptoms that may or may not be related to this stone include abdominal pain.    The following portions of the patient's history were reviewed and updated as appropriate: allergies, current medications, past family history, past medical history, past social history, past surgical history and problem list.    Review of Systems   Constitutional: Negative for chills and fever.   Gastrointestinal: Positive for abdominal pain. Negative for abdominal distention, anal bleeding, blood in stool, nausea and vomiting.   Genitourinary: Negative for decreased urine volume, difficulty urinating, dysuria, flank pain, hematuria and urgency.         Current Outpatient Medications:   •  fenofibrate (TRICOR) 145 MG tablet, Take 145 mg by mouth Daily., Disp: , Rfl:   •  metoprolol tartrate (LOPRESSOR) 100 MG tablet, Take 100 mg by mouth 2 (Two) Times a Day., Disp: , Rfl:   •  Omega-3 Fatty Acids (FISH OIL) 1000 MG capsule capsule, Take  by mouth Daily With Breakfast., Disp: , Rfl:   •  pantoprazole (PROTONIX) 40 MG EC tablet, Take 40 mg by mouth Daily., Disp: , Rfl:     Past Medical History:   Diagnosis Date   • Hypertension        Past Surgical History:   Procedure Laterality Date   • GALLBLADDER SURGERY     • TONSILLECTOMY         Social History     Socioeconomic History   • Marital status: Single     Spouse name: Not on file   • Number of children: Not on file   • Years of education: Not on file   • Highest education level: Not on file   Tobacco Use   • Smoking status: Former Smoker   • Smokeless tobacco: Never  "Used   Substance and Sexual Activity   • Alcohol use: Defer   • Drug use: Defer   • Sexual activity: Defer       Family History   Problem Relation Age of Onset   • No Known Problems Mother    • No Known Problems Father    • No Known Problems Sister    • No Known Problems Brother    • No Known Problems Daughter    • No Known Problems Son    • Heart disease Maternal Grandmother    • No Known Problems Paternal Grandmother    • No Known Problems Maternal Aunt    • No Known Problems Paternal Aunt    • BRCA 1/2 Neg Hx    • Breast cancer Neg Hx    • Colon cancer Neg Hx    • Endometrial cancer Neg Hx    • Ovarian cancer Neg Hx        Objective    Temp 97.3 °F (36.3 °C)   Ht 175.3 cm (69\")   Wt 77.8 kg (171 lb 9.6 oz)   BMI 25.34 kg/m²     Physical Exam   Constitutional: He is oriented to person, place, and time. He appears well-developed and well-nourished.   HENT:   Head: Normocephalic.   Pulmonary/Chest: Effort normal. No respiratory distress.   Abdominal: He exhibits no distension.   Musculoskeletal: He exhibits no edema.   Neurological: He is alert and oriented to person, place, and time.   Skin: Skin is warm and dry.   Psychiatric: He has a normal mood and affect. His behavior is normal.   Vitals reviewed.    Patient's Body mass index is 25.34 kg/m². BMI is above normal parameters. Recommendations include: educational material.      Admission on 07/03/2018, Discharged on 07/04/2018   Component Date Value Ref Range Status   • Glucose 07/03/2018 122* 70 - 100 mg/dL Final   • BUN 07/03/2018 26* 5 - 21 mg/dL Final   • Creatinine 07/03/2018 1.10  0.50 - 1.40 mg/dL Final   • Sodium 07/03/2018 143  135 - 145 mmol/L Final   • Potassium 07/03/2018 3.8  3.5 - 5.3 mmol/L Final   • Chloride 07/03/2018 100  98 - 110 mmol/L Final   • CO2 07/03/2018 27.0  24.0 - 31.0 mmol/L Final   • Calcium 07/03/2018 9.3  8.4 - 10.4 mg/dL Final   • Total Protein 07/03/2018 7.6  6.3 - 8.7 g/dL Final   • Albumin 07/03/2018 4.80  3.50 - 5.00 g/dL " Final   • ALT (SGPT) 07/03/2018 42  0 - 54 U/L Final   • AST (SGOT) 07/03/2018 38  7 - 45 U/L Final   • Alkaline Phosphatase 07/03/2018 31  24 - 120 U/L Final   • Total Bilirubin 07/03/2018 0.5  0.1 - 1.0 mg/dL Final   • eGFR Non African Amer 07/03/2018 71  >60 mL/min/1.73 Final   • Globulin 07/03/2018 2.8  gm/dL Final   • A/G Ratio 07/03/2018 1.7  1.1 - 2.5 g/dL Final   • BUN/Creatinine Ratio 07/03/2018 23.6  7.0 - 25.0 Final   • Anion Gap 07/03/2018 16.0* 4.0 - 13.0 mmol/L Final   • Lipase 07/03/2018 38  23 - 203 U/L Final   • Color, UA 07/04/2018 Dark Yellow* Yellow, Straw Final   • Appearance, UA 07/04/2018 Clear  Clear Final   • pH, UA 07/04/2018 6.0  5.0 - 8.0 Final   • Specific Gravity, UA 07/04/2018 >1.030* 1.005 - 1.030 Final   • Glucose, UA 07/04/2018 Negative  Negative Final   • Ketones, UA 07/04/2018 Trace* Negative Final   • Bilirubin, UA 07/04/2018 Negative  Negative Final   • Blood, UA 07/04/2018 Negative  Negative Final   • Protein, UA 07/04/2018 Trace* Negative Final   • Leuk Esterase, UA 07/04/2018 Moderate (2+)* Negative Final   • Nitrite, UA 07/04/2018 Negative  Negative Final   • Urobilinogen, UA 07/04/2018 1.0 E.U./dL  0.2 - 1.0 E.U./dL Final   • Blood Culture 07/03/2018 No growth at 5 days   Final   • Blood Culture 07/03/2018 No growth at 5 days   Final   • Lactate 07/03/2018 2.4* 0.5 - 2.0 mmol/L Final   • Extra Tube 07/03/2018 hold for add-on   Final    Auto resulted   • Extra Tube 07/03/2018 Hold for add-ons.   Final    Auto resulted.   • Extra Tube 07/03/2018 hold for add-on   Final    Auto resulted   • Extra Tube 07/03/2018 Hold for add-ons.   Final    Auto resulted.   • WBC 07/03/2018 10.51  4.80 - 10.80 10*3/mm3 Final   • RBC 07/03/2018 5.39  4.80 - 5.90 10*6/mm3 Final   • Hemoglobin 07/03/2018 15.6  14.0 - 18.0 g/dL Final   • Hematocrit 07/03/2018 45.9  40.0 - 52.0 % Final   • MCV 07/03/2018 85.2  82.0 - 95.0 fL Final   • MCH 07/03/2018 28.9  28.0 - 32.0 pg Final   • MCHC 07/03/2018  34.0  33.0 - 36.0 g/dL Final   • RDW 07/03/2018 12.2  12.0 - 15.0 % Final   • RDW-SD 07/03/2018 37.7* 40.0 - 54.0 fl Final   • MPV 07/03/2018 9.5  6.0 - 12.0 fL Final   • Platelets 07/03/2018 272  130 - 400 10*3/mm3 Final   • Neutrophil % 07/03/2018 89.3* 39.0 - 78.0 % Final   • Lymphocyte % 07/03/2018 4.6* 15.0 - 45.0 % Final   • Monocyte % 07/03/2018 5.1  4.0 - 12.0 % Final   • Eosinophil % 07/03/2018 0.2  0.0 - 4.0 % Final   • Basophil % 07/03/2018 0.3  0.0 - 2.0 % Final   • Immature Grans % 07/03/2018 0.5  0.0 - 5.0 % Final   • Neutrophils, Absolute 07/03/2018 9.39* 1.87 - 8.40 10*3/mm3 Final   • Lymphocytes, Absolute 07/03/2018 0.48* 0.72 - 4.86 10*3/mm3 Final   • Monocytes, Absolute 07/03/2018 0.54  0.19 - 1.30 10*3/mm3 Final   • Eosinophils, Absolute 07/03/2018 0.02  0.00 - 0.70 10*3/mm3 Final   • Basophils, Absolute 07/03/2018 0.03  0.00 - 0.20 10*3/mm3 Final   • Immature Grans, Absolute 07/03/2018 0.05* 0.00 - 0.03 10*3/mm3 Final   • nRBC 07/03/2018 0.0  0.0 - 0.0 /100 WBC Final   • Extra Tube 07/03/2018 Hold for add-ons.   Final    Auto resulted.   • RBC, UA 07/04/2018 0-2* None Seen /HPF Final   • WBC, UA 07/04/2018 21-30* None Seen /HPF Final   • Bacteria, UA 07/04/2018 Trace* None Seen /HPF Final   • Squamous Epithelial Cells, UA 07/04/2018 3-6* None Seen, 0-2 /HPF Final   • Hyaline Casts, UA 07/04/2018 None Seen  None Seen /LPF Final   • Methodology 07/04/2018 Automated Microscopy   Final   • Urine Culture 07/04/2018 20,000-30,000 CFU/mL Mixed Gram Positive Sana*  Final       Results for orders placed or performed in visit on 03/25/19   POC Urinalysis Dipstick, Multipro   Result Value Ref Range    Color Yellow Yellow, Straw, Dark Yellow, Anai    Clarity, UA Clear Clear    Glucose, UA Negative Negative, 1000 mg/dL (3+) mg/dL    Bilirubin Negative Negative    Ketones, UA Negative Negative    Specific Gravity  1.025 1.005 - 1.030    Blood, UA Trace (A) Negative    pH, Urine 7.0 5.0 - 8.0    Protein, POC  Negative Negative mg/dL    Urobilinogen, UA Normal Normal    Nitrite, UA Positive (A) Negative    Leukocytes Large (3+) (A) Negative        Assessment/Plan   Assessment and Plan    Frederick was seen today for nephrolithiasis.    Diagnoses and all orders for this visit:    Nephrolithiasis  -     POC Urinalysis Dipstick, Multipro  -     Urine Culture - Urine, Urine, Clean Catch; Future  -     Urine Culture - Urine, Urine, Clean Catch    Patient presents for follow up of kidney stones as described above.  KUB today reveals stable stone disease.  Patient does report some abdominal pain that started approximately one week ago but he is without urinary symptoms.  I am unsure if this is related to a stone, UTI, or other source.  His urine does look suspicious for a UTI today, so I will send this for culture and treat appropriately.  If this does not decrease symptoms, we may proceed with a CT scan to definitively assess the presence of a stone or other cause.    For now, patient would like to continue watchful waiting.  He will follow up in one year with a KUB prior.

## 2019-03-25 NOTE — PATIENT INSTRUCTIONS

## 2019-03-27 ENCOUNTER — TELEPHONE (OUTPATIENT)
Dept: UROLOGY | Facility: CLINIC | Age: 54
End: 2019-03-27

## 2019-03-27 DIAGNOSIS — N30.01 ACUTE CYSTITIS WITH HEMATURIA: Primary | ICD-10-CM

## 2019-03-27 LAB — BACTERIA SPEC AEROBE CULT: ABNORMAL

## 2019-03-27 RX ORDER — NITROFURANTOIN 25; 75 MG/1; MG/1
100 CAPSULE ORAL 2 TIMES DAILY
Qty: 14 CAPSULE | Refills: 0 | Status: SHIPPED | OUTPATIENT
Start: 2019-03-27 | End: 2021-03-29

## 2019-03-27 NOTE — TELEPHONE ENCOUNTER
Called and informed pt of positive urine culture results. Rea KNIGHT faxed in a ATB for him to take BID x7 days. Verified understanding.

## 2019-05-01 ENCOUNTER — TRANSCRIBE ORDERS (OUTPATIENT)
Dept: ADMINISTRATIVE | Facility: HOSPITAL | Age: 54
End: 2019-05-01

## 2019-05-01 DIAGNOSIS — G44.52 NEW DAILY PERSISTENT HEADACHE: Primary | ICD-10-CM

## 2019-05-03 ENCOUNTER — HOSPITAL ENCOUNTER (OUTPATIENT)
Dept: MRI IMAGING | Facility: HOSPITAL | Age: 54
Discharge: HOME | End: 2019-05-03
Admitting: FAMILY MEDICINE

## 2019-05-03 DIAGNOSIS — G44.52 NEW DAILY PERSISTENT HEADACHE: ICD-10-CM

## 2019-05-03 PROCEDURE — 70551 MRI BRAIN STEM W/O DYE: CPT

## 2019-10-30 ENCOUNTER — TRANSCRIBE ORDERS (OUTPATIENT)
Dept: ADMINISTRATIVE | Facility: HOSPITAL | Age: 54
End: 2019-10-30

## 2019-10-30 ENCOUNTER — HOSPITAL ENCOUNTER (OUTPATIENT)
Dept: ULTRASOUND IMAGING | Facility: HOSPITAL | Age: 54
Discharge: HOME | End: 2019-10-30
Admitting: NURSE PRACTITIONER

## 2019-10-30 DIAGNOSIS — R10.31 ABDOMINAL PAIN, RIGHT LOWER QUADRANT: Primary | ICD-10-CM

## 2019-10-30 DIAGNOSIS — R10.31 ABDOMINAL PAIN, RIGHT LOWER QUADRANT: ICD-10-CM

## 2019-10-30 PROCEDURE — 76700 US EXAM ABDOM COMPLETE: CPT

## 2019-12-02 ENCOUNTER — OFFICE VISIT (OUTPATIENT)
Dept: GASTROENTEROLOGY | Facility: CLINIC | Age: 54
End: 2019-12-02

## 2019-12-02 ENCOUNTER — LAB (OUTPATIENT)
Dept: LAB | Facility: HOSPITAL | Age: 54
End: 2019-12-02

## 2019-12-02 VITALS
HEART RATE: 90 BPM | OXYGEN SATURATION: 97 % | BODY MASS INDEX: 23.7 KG/M2 | DIASTOLIC BLOOD PRESSURE: 70 MMHG | WEIGHT: 160 LBS | HEIGHT: 69 IN | SYSTOLIC BLOOD PRESSURE: 124 MMHG | TEMPERATURE: 97 F

## 2019-12-02 DIAGNOSIS — R10.13 EPIGASTRIC PAIN: Primary | ICD-10-CM

## 2019-12-02 DIAGNOSIS — R10.13 EPIGASTRIC PAIN: ICD-10-CM

## 2019-12-02 PROCEDURE — 80053 COMPREHEN METABOLIC PANEL: CPT | Performed by: INTERNAL MEDICINE

## 2019-12-02 PROCEDURE — 85027 COMPLETE CBC AUTOMATED: CPT | Performed by: INTERNAL MEDICINE

## 2019-12-02 PROCEDURE — 99213 OFFICE O/P EST LOW 20 MIN: CPT | Performed by: INTERNAL MEDICINE

## 2019-12-02 PROCEDURE — 36415 COLL VENOUS BLD VENIPUNCTURE: CPT

## 2019-12-02 RX ORDER — OLANZAPINE 10 MG/1
10 TABLET ORAL NIGHTLY
COMMUNITY

## 2019-12-02 NOTE — PROGRESS NOTES
Chief Complaint   Patient presents with   • Liver Eval     HAD ABDNORMAL US SHOWED LIVER PROBLEM       PCP: Edgar Gomez Jr., MD  REFER: Shirley Hillman*    Subjective     HPI  DOING ABOUT THE SAME WITH VERY INTERMITTENT ABD PAIN /UNRELATED TO EATING  Has a daily to every other day BM  Denies BRIGHT RED BLOOD PER RECTUM  Weight has been stable  Appetite is stable    Past Medical History:   Diagnosis Date   • Hypertension        Past Surgical History:   Procedure Laterality Date   • GALLBLADDER SURGERY     • TONSILLECTOMY         Outpatient Medications Marked as Taking for the 12/2/19 encounter (Office Visit) with Willian Bajwa, DO   Medication Sig Dispense Refill   • fenofibrate (TRICOR) 145 MG tablet Take 145 mg by mouth Daily.     • metoprolol tartrate (LOPRESSOR) 100 MG tablet Take 100 mg by mouth 2 (Two) Times a Day.     • OLANZapine (zyPREXA) 10 MG tablet Take 10 mg by mouth Every Night.     • Omega-3 Fatty Acids (FISH OIL) 1000 MG capsule capsule Take  by mouth Daily With Breakfast.     • pantoprazole (PROTONIX) 40 MG EC tablet Take 40 mg by mouth Daily.         No Known Allergies    Social History     Socioeconomic History   • Marital status: Single     Spouse name: Not on file   • Number of children: Not on file   • Years of education: Not on file   • Highest education level: Not on file   Tobacco Use   • Smoking status: Former Smoker   • Smokeless tobacco: Never Used   Substance and Sexual Activity   • Alcohol use: No     Frequency: Never   • Drug use: No   • Sexual activity: Defer       Family History   Problem Relation Age of Onset   • No Known Problems Mother    • No Known Problems Father    • No Known Problems Sister    • No Known Problems Brother    • No Known Problems Daughter    • No Known Problems Son    • Heart disease Maternal Grandmother    • No Known Problems Paternal Grandmother    • No Known Problems Maternal Aunt    • No Known Problems Paternal Aunt    • BRCA 1/2 Neg Hx    •  "Breast cancer Neg Hx    • Colon cancer Neg Hx    • Endometrial cancer Neg Hx    • Ovarian cancer Neg Hx        Review of Systems   Constitutional: Negative for fatigue, fever and unexpected weight change.   HENT: Negative for hearing loss, sore throat and voice change.    Eyes: Negative for visual disturbance.   Respiratory: Negative for cough, shortness of breath and wheezing.    Cardiovascular: Negative for chest pain and palpitations.   Gastrointestinal: Negative for abdominal pain, blood in stool and vomiting.   Endocrine: Negative for polydipsia and polyuria.   Genitourinary: Negative for difficulty urinating, dysuria, hematuria and urgency.   Musculoskeletal: Negative for joint swelling and myalgias.   Skin: Negative for color change, rash and wound.   Neurological: Negative for dizziness, tremors, seizures and syncope.   Hematological: Does not bruise/bleed easily.   Psychiatric/Behavioral: Negative for agitation and confusion. The patient is not nervous/anxious.        Objective     Vitals:    12/02/19 1413   BP: 124/70   Pulse: 90   Temp: 97 °F (36.1 °C)   SpO2: 97%   Weight: 72.6 kg (160 lb)   Height: 175.3 cm (69\")     Body mass index is 23.63 kg/m².    Physical Exam   Constitutional: He is oriented to person, place, and time. He appears well-developed and well-nourished.   HENT:   Head: Normocephalic and atraumatic.   Eyes:   Pink, Nonicteric   Neck:   Global Assessment- supple. No JVD or lymphadenopathy   Cardiovascular: Normal rate, regular rhythm and normal heart sounds. Exam reveals no gallop and no friction rub.   No murmur heard.  Pulmonary/Chest: Effort normal and breath sounds normal. No respiratory distress. He has no wheezes. He has no rales.   Inspection: Movements-Symmetrical   Abdominal: Soft. Bowel sounds are normal. He exhibits no distension and no mass. There is no tenderness. There is no rebound and no guarding.   Neurological: He is alert and oriented to person, place, and time. "   General Exam-Deemed a reliable historian, able to converse without difficulty and Able to move all extremities without difficulty       Imaging Results (Most Recent)     None          Body mass index is 23.63 kg/m².    Assessment/Plan     Frederick was seen today for liver eval.    Diagnoses and all orders for this visit:    Epigastric pain    CMP /CBC  Will speak personally with the radiologist and ask her to compare the recent US with his prev CT's    * Surgery not found *    Patient's Body mass index is 23.63 kg/m². BMI is within normal parameters. No follow-up required..      There are no Patient Instructions on file for this visit.

## 2019-12-02 NOTE — H&P (VIEW-ONLY)
Chief Complaint   Patient presents with   • Liver Eval     HAD ABDNORMAL US SHOWED LIVER PROBLEM       PCP: Edgar oGmez Jr., MD  REFER: Shirley Hillman*    Subjective     HPI  DOING ABOUT THE SAME WITH VERY INTERMITTENT ABD PAIN /UNRELATED TO EATING  Has a daily to every other day BM  Denies BRIGHT RED BLOOD PER RECTUM  Weight has been stable  Appetite is stable    Past Medical History:   Diagnosis Date   • Hypertension        Past Surgical History:   Procedure Laterality Date   • GALLBLADDER SURGERY     • TONSILLECTOMY         Outpatient Medications Marked as Taking for the 12/2/19 encounter (Office Visit) with Willian Bajwa, DO   Medication Sig Dispense Refill   • fenofibrate (TRICOR) 145 MG tablet Take 145 mg by mouth Daily.     • metoprolol tartrate (LOPRESSOR) 100 MG tablet Take 100 mg by mouth 2 (Two) Times a Day.     • OLANZapine (zyPREXA) 10 MG tablet Take 10 mg by mouth Every Night.     • Omega-3 Fatty Acids (FISH OIL) 1000 MG capsule capsule Take  by mouth Daily With Breakfast.     • pantoprazole (PROTONIX) 40 MG EC tablet Take 40 mg by mouth Daily.         No Known Allergies    Social History     Socioeconomic History   • Marital status: Single     Spouse name: Not on file   • Number of children: Not on file   • Years of education: Not on file   • Highest education level: Not on file   Tobacco Use   • Smoking status: Former Smoker   • Smokeless tobacco: Never Used   Substance and Sexual Activity   • Alcohol use: No     Frequency: Never   • Drug use: No   • Sexual activity: Defer       Family History   Problem Relation Age of Onset   • No Known Problems Mother    • No Known Problems Father    • No Known Problems Sister    • No Known Problems Brother    • No Known Problems Daughter    • No Known Problems Son    • Heart disease Maternal Grandmother    • No Known Problems Paternal Grandmother    • No Known Problems Maternal Aunt    • No Known Problems Paternal Aunt    • BRCA 1/2 Neg Hx    •  "Breast cancer Neg Hx    • Colon cancer Neg Hx    • Endometrial cancer Neg Hx    • Ovarian cancer Neg Hx        Review of Systems   Constitutional: Negative for fatigue, fever and unexpected weight change.   HENT: Negative for hearing loss, sore throat and voice change.    Eyes: Negative for visual disturbance.   Respiratory: Negative for cough, shortness of breath and wheezing.    Cardiovascular: Negative for chest pain and palpitations.   Gastrointestinal: Negative for abdominal pain, blood in stool and vomiting.   Endocrine: Negative for polydipsia and polyuria.   Genitourinary: Negative for difficulty urinating, dysuria, hematuria and urgency.   Musculoskeletal: Negative for joint swelling and myalgias.   Skin: Negative for color change, rash and wound.   Neurological: Negative for dizziness, tremors, seizures and syncope.   Hematological: Does not bruise/bleed easily.   Psychiatric/Behavioral: Negative for agitation and confusion. The patient is not nervous/anxious.        Objective     Vitals:    12/02/19 1413   BP: 124/70   Pulse: 90   Temp: 97 °F (36.1 °C)   SpO2: 97%   Weight: 72.6 kg (160 lb)   Height: 175.3 cm (69\")     Body mass index is 23.63 kg/m².    Physical Exam   Constitutional: He is oriented to person, place, and time. He appears well-developed and well-nourished.   HENT:   Head: Normocephalic and atraumatic.   Eyes:   Pink, Nonicteric   Neck:   Global Assessment- supple. No JVD or lymphadenopathy   Cardiovascular: Normal rate, regular rhythm and normal heart sounds. Exam reveals no gallop and no friction rub.   No murmur heard.  Pulmonary/Chest: Effort normal and breath sounds normal. No respiratory distress. He has no wheezes. He has no rales.   Inspection: Movements-Symmetrical   Abdominal: Soft. Bowel sounds are normal. He exhibits no distension and no mass. There is no tenderness. There is no rebound and no guarding.   Neurological: He is alert and oriented to person, place, and time. "   General Exam-Deemed a reliable historian, able to converse without difficulty and Able to move all extremities without difficulty       Imaging Results (Most Recent)     None          Body mass index is 23.63 kg/m².    Assessment/Plan     Frederick was seen today for liver eval.    Diagnoses and all orders for this visit:    Epigastric pain    CMP /CBC  Will speak personally with the radiologist and ask her to compare the recent US with his prev CT's    * Surgery not found *    Patient's Body mass index is 23.63 kg/m². BMI is within normal parameters. No follow-up required..      There are no Patient Instructions on file for this visit.

## 2019-12-03 ENCOUNTER — TELEPHONE (OUTPATIENT)
Dept: GASTROENTEROLOGY | Facility: CLINIC | Age: 54
End: 2019-12-03

## 2019-12-03 ENCOUNTER — PREP FOR SURGERY (OUTPATIENT)
Dept: OTHER | Facility: HOSPITAL | Age: 54
End: 2019-12-03

## 2019-12-03 DIAGNOSIS — R10.13 EPIGASTRIC PAIN: Primary | ICD-10-CM

## 2019-12-03 LAB
ALBUMIN SERPL-MCNC: 4.8 G/DL (ref 3.5–5.2)
ALBUMIN/GLOB SERPL: 2 G/DL
ALP SERPL-CCNC: 33 U/L (ref 39–117)
ALT SERPL W P-5'-P-CCNC: 22 U/L (ref 1–41)
ANION GAP SERPL CALCULATED.3IONS-SCNC: 10.2 MMOL/L (ref 5–15)
AST SERPL-CCNC: 24 U/L (ref 1–40)
BILIRUB SERPL-MCNC: 0.2 MG/DL (ref 0.2–1.2)
BUN BLD-MCNC: 16 MG/DL (ref 6–20)
BUN/CREAT SERPL: 14.7 (ref 7–25)
CALCIUM SPEC-SCNC: 9.9 MG/DL (ref 8.6–10.5)
CHLORIDE SERPL-SCNC: 103 MMOL/L (ref 98–107)
CO2 SERPL-SCNC: 30.8 MMOL/L (ref 22–29)
CREAT BLD-MCNC: 1.09 MG/DL (ref 0.76–1.27)
DEPRECATED RDW RBC AUTO: 42.7 FL (ref 37–54)
ERYTHROCYTE [DISTWIDTH] IN BLOOD BY AUTOMATED COUNT: 12.9 % (ref 12.3–15.4)
GFR SERPL CREATININE-BSD FRML MDRD: 71 ML/MIN/1.73
GLOBULIN UR ELPH-MCNC: 2.4 GM/DL
GLUCOSE BLD-MCNC: 87 MG/DL (ref 65–99)
HCT VFR BLD AUTO: 42.3 % (ref 37.5–51)
HGB BLD-MCNC: 14.2 G/DL (ref 13–17.7)
MCH RBC QN AUTO: 30 PG (ref 26.6–33)
MCHC RBC AUTO-ENTMCNC: 33.6 G/DL (ref 31.5–35.7)
MCV RBC AUTO: 89.2 FL (ref 79–97)
PLATELET # BLD AUTO: 301 10*3/MM3 (ref 140–450)
PMV BLD AUTO: 10.2 FL (ref 6–12)
POTASSIUM BLD-SCNC: 4.2 MMOL/L (ref 3.5–5.2)
PROT SERPL-MCNC: 7.2 G/DL (ref 6–8.5)
RBC # BLD AUTO: 4.74 10*6/MM3 (ref 4.14–5.8)
SODIUM BLD-SCNC: 144 MMOL/L (ref 136–145)
WBC NRBC COR # BLD: 7.85 10*3/MM3 (ref 3.4–10.8)

## 2019-12-03 NOTE — TELEPHONE ENCOUNTER
Just talked to the pt's mom  Told her I talked to the radiologist and she told me the US(19) and the CT (18) were essentially the same  Told his mom that his LFT's were also Normal   Due to the fact he is intermittently having epigastric pain we both thought an EGD seemed reasonable  She did tell me she is having car/vehicle issues but will o her best to arrange something(transportation)    So can u guys contact he/her to get him on my schedule    No urgent hurry

## 2019-12-04 NOTE — TELEPHONE ENCOUNTER
12/16/2019 at 8:45am     Patients mother voiced understanding about instructions.     Mailed instructions to them also.

## 2019-12-05 PROBLEM — R10.13 EPIGASTRIC PAIN: Status: ACTIVE | Noted: 2019-12-05

## 2019-12-16 ENCOUNTER — ANESTHESIA (OUTPATIENT)
Dept: GASTROENTEROLOGY | Facility: HOSPITAL | Age: 54
End: 2019-12-16

## 2019-12-16 ENCOUNTER — HOSPITAL ENCOUNTER (OUTPATIENT)
Facility: HOSPITAL | Age: 54
Setting detail: HOSPITAL OUTPATIENT SURGERY
Discharge: HOME | End: 2019-12-16
Attending: INTERNAL MEDICINE | Admitting: INTERNAL MEDICINE

## 2019-12-16 ENCOUNTER — ANESTHESIA EVENT (OUTPATIENT)
Dept: GASTROENTEROLOGY | Facility: HOSPITAL | Age: 54
End: 2019-12-16

## 2019-12-16 VITALS
WEIGHT: 172 LBS | HEIGHT: 69 IN | RESPIRATION RATE: 15 BRPM | HEART RATE: 68 BPM | BODY MASS INDEX: 25.48 KG/M2 | OXYGEN SATURATION: 98 % | TEMPERATURE: 97.1 F | DIASTOLIC BLOOD PRESSURE: 70 MMHG | SYSTOLIC BLOOD PRESSURE: 110 MMHG

## 2019-12-16 DIAGNOSIS — R10.13 EPIGASTRIC PAIN: ICD-10-CM

## 2019-12-16 PROCEDURE — 43239 EGD BIOPSY SINGLE/MULTIPLE: CPT | Performed by: INTERNAL MEDICINE

## 2019-12-16 PROCEDURE — 87081 CULTURE SCREEN ONLY: CPT | Performed by: INTERNAL MEDICINE

## 2019-12-16 PROCEDURE — 25010000002 PROPOFOL 10 MG/ML EMULSION: Performed by: NURSE ANESTHETIST, CERTIFIED REGISTERED

## 2019-12-16 RX ORDER — SODIUM CHLORIDE 9 MG/ML
100 INJECTION, SOLUTION INTRAVENOUS CONTINUOUS
Status: DISCONTINUED | OUTPATIENT
Start: 2019-12-16 | End: 2019-12-16 | Stop reason: HOSPADM

## 2019-12-16 RX ORDER — SODIUM CHLORIDE 0.9 % (FLUSH) 0.9 %
10 SYRINGE (ML) INJECTION AS NEEDED
Status: DISCONTINUED | OUTPATIENT
Start: 2019-12-16 | End: 2019-12-16 | Stop reason: HOSPADM

## 2019-12-16 RX ORDER — SODIUM CHLORIDE 0.9 % (FLUSH) 0.9 %
10 SYRINGE (ML) INJECTION EVERY 12 HOURS SCHEDULED
Status: CANCELLED | OUTPATIENT
Start: 2019-12-16

## 2019-12-16 RX ORDER — PROPOFOL 10 MG/ML
VIAL (ML) INTRAVENOUS AS NEEDED
Status: DISCONTINUED | OUTPATIENT
Start: 2019-12-16 | End: 2019-12-16 | Stop reason: SURG

## 2019-12-16 RX ADMIN — LIDOCAINE HYDROCHLORIDE 40 MG: 20 INJECTION, SOLUTION INTRAVENOUS at 09:18

## 2019-12-16 RX ADMIN — SODIUM CHLORIDE 100 ML/HR: 9 INJECTION, SOLUTION INTRAVENOUS at 09:07

## 2019-12-16 RX ADMIN — PROPOFOL 50 MG: 10 INJECTION, EMULSION INTRAVENOUS at 09:19

## 2019-12-16 RX ADMIN — PROPOFOL 50 MG: 10 INJECTION, EMULSION INTRAVENOUS at 09:18

## 2019-12-16 NOTE — ANESTHESIA POSTPROCEDURE EVALUATION
Patient: Frederick Simms    Procedure Summary     Date:  12/16/19 Room / Location:  [facility] ENDOSCOPY 4 / [facility]    Anesthesia Start:  0915 Anesthesia Stop:  0924    Procedure:  ESOPHAGOGASTRODUODENOSCOPY WITH ANESTHESIA (N/A ) Diagnosis:       Epigastric pain      (Epigastric pain [R10.13])    Surgeon:  Willian Bajwa DO Provider:  Mega Duval CRNA    Anesthesia Type:  MAC ASA Status:  2          Anesthesia Type: MAC    Vitals  Vitals Value Taken Time   /71 12/16/2019  9:35 AM   Temp     Pulse 74 12/16/2019  9:40 AM   Resp 13 12/16/2019  9:35 AM   SpO2 98 % 12/16/2019  9:40 AM   Vitals shown include unvalidated device data.        Post Anesthesia Care and Evaluation    Patient location during evaluation: PHASE II  Patient participation: complete - patient participated  Level of consciousness: awake  Pain score: 0  Pain management: adequate  Airway patency: patent  Anesthetic complications: No anesthetic complications  PONV Status: none  Cardiovascular status: acceptable  Respiratory status: acceptable  Hydration status: acceptable  No anesthesia care post op

## 2019-12-16 NOTE — ANESTHESIA PREPROCEDURE EVALUATION
Anesthesia Evaluation     Patient summary reviewed   no history of anesthetic complications:  NPO Solid Status: > 8 hours  NPO Liquid Status: > 8 hours           Airway   Mallampati: I  TM distance: >3 FB  Neck ROM: full  Dental - normal exam         Pulmonary - normal exam    breath sounds clear to auscultation  (-) asthma, recent URI, sleep apnea, not a smoker  Cardiovascular - normal exam  Exercise tolerance: good (4-7 METS)    Rhythm: regular  Rate: normal    (+) dysrhythmias Tachycardia, hyperlipidemia,   (-) pacemaker, hypertension, past MI, angina, cardiac stents, CABG      Neuro/Psych  (+) seizures (last one 30 years ago),     (-) TIA, CVA  GI/Hepatic/Renal/Endo    (+)  GERD,  thyroid problem hypothyroidism  (-) liver disease, no renal disease, diabetes    Musculoskeletal     Abdominal    Substance History      OB/GYN          Other                      Anesthesia Plan    ASA 2     MAC     intravenous induction     Anesthetic plan, all risks, benefits, and alternatives have been provided, discussed and informed consent has been obtained with: patient.

## 2019-12-17 LAB — UREASE TISS QL: NEGATIVE

## 2020-01-22 DIAGNOSIS — N20.0 NEPHROLITHIASIS: Primary | ICD-10-CM

## 2020-04-01 DIAGNOSIS — N20.0 NEPHROLITHIASIS: Primary | ICD-10-CM

## 2020-08-27 ENCOUNTER — TRANSCRIBE ORDERS (OUTPATIENT)
Dept: ADMINISTRATIVE | Facility: HOSPITAL | Age: 55
End: 2020-08-27

## 2020-08-27 DIAGNOSIS — R42 DIZZINESS AND GIDDINESS: Primary | ICD-10-CM

## 2020-09-01 ENCOUNTER — HOSPITAL ENCOUNTER (OUTPATIENT)
Dept: CARDIOLOGY | Facility: HOSPITAL | Age: 55
Discharge: HOME | End: 2020-09-01
Admitting: NURSE PRACTITIONER

## 2020-09-01 DIAGNOSIS — R42 DIZZINESS AND GIDDINESS: ICD-10-CM

## 2020-09-01 PROCEDURE — 93226 XTRNL ECG REC<48 HR SCAN A/R: CPT

## 2020-09-01 PROCEDURE — 93225 XTRNL ECG REC<48 HRS REC: CPT

## 2020-09-02 ENCOUNTER — OFFICE VISIT (OUTPATIENT)
Dept: UROLOGY | Facility: CLINIC | Age: 55
End: 2020-09-02

## 2020-09-02 ENCOUNTER — HOSPITAL ENCOUNTER (OUTPATIENT)
Dept: GENERAL RADIOLOGY | Facility: HOSPITAL | Age: 55
Discharge: HOME | End: 2020-09-02
Admitting: NURSE PRACTITIONER

## 2020-09-02 VITALS — WEIGHT: 169.2 LBS | BODY MASS INDEX: 25.06 KG/M2 | HEIGHT: 69 IN | TEMPERATURE: 97.6 F

## 2020-09-02 DIAGNOSIS — N20.0 NEPHROLITHIASIS: ICD-10-CM

## 2020-09-02 DIAGNOSIS — N20.0 NEPHROLITHIASIS: Primary | ICD-10-CM

## 2020-09-02 LAB
BILIRUB BLD-MCNC: NEGATIVE MG/DL
CLARITY, POC: CLEAR
COLOR UR: YELLOW
GLUCOSE UR STRIP-MCNC: NEGATIVE MG/DL
KETONES UR QL: NEGATIVE
LEUKOCYTE EST, POC: ABNORMAL
NITRITE UR-MCNC: NEGATIVE MG/ML
PH UR: 6 [PH] (ref 5–8)
PROT UR STRIP-MCNC: NEGATIVE MG/DL
RBC # UR STRIP: ABNORMAL /UL
SP GR UR: 1.01 (ref 1–1.03)
UROBILINOGEN UR QL: NORMAL

## 2020-09-02 PROCEDURE — 99213 OFFICE O/P EST LOW 20 MIN: CPT | Performed by: UROLOGY

## 2020-09-02 PROCEDURE — 81003 URINALYSIS AUTO W/O SCOPE: CPT | Performed by: UROLOGY

## 2020-09-02 PROCEDURE — 74018 RADEX ABDOMEN 1 VIEW: CPT

## 2020-09-02 RX ORDER — FENOFIBRATE 160 MG/1
TABLET ORAL
COMMUNITY
Start: 2020-07-20 | End: 2020-09-02 | Stop reason: SDUPTHER

## 2020-09-02 RX ORDER — AMITRIPTYLINE HYDROCHLORIDE 50 MG/1
50 TABLET, FILM COATED ORAL NIGHTLY
COMMUNITY

## 2020-09-02 RX ORDER — LEVOTHYROXINE SODIUM 50 UG/1
50 CAPSULE ORAL NIGHTLY
Status: ON HOLD | COMMUNITY
End: 2021-11-15 | Stop reason: SDUPTHER

## 2020-09-02 RX ORDER — METOPROLOL SUCCINATE 50 MG/1
50 TABLET, EXTENDED RELEASE ORAL DAILY
COMMUNITY
Start: 2020-08-26 | End: 2020-09-02 | Stop reason: SDUPTHER

## 2020-09-02 RX ORDER — LEVOTHYROXINE SODIUM 50 UG/1
50 TABLET ORAL NIGHTLY
Status: ON HOLD | COMMUNITY
Start: 2020-08-11 | End: 2021-06-09

## 2020-09-15 PROCEDURE — 93227 XTRNL ECG REC<48 HR R&I: CPT | Performed by: INTERNAL MEDICINE

## 2021-03-29 ENCOUNTER — HOSPITAL ENCOUNTER (OUTPATIENT)
Dept: ULTRASOUND IMAGING | Facility: HOSPITAL | Age: 56
Discharge: HOME | End: 2021-03-29

## 2021-03-29 ENCOUNTER — OFFICE VISIT (OUTPATIENT)
Dept: FAMILY MEDICINE CLINIC | Facility: CLINIC | Age: 56
End: 2021-03-29

## 2021-03-29 ENCOUNTER — HOSPITAL ENCOUNTER (OUTPATIENT)
Dept: CT IMAGING | Facility: HOSPITAL | Age: 56
Discharge: HOME | End: 2021-03-29

## 2021-03-29 VITALS
TEMPERATURE: 97.3 F | WEIGHT: 171 LBS | DIASTOLIC BLOOD PRESSURE: 67 MMHG | HEART RATE: 73 BPM | SYSTOLIC BLOOD PRESSURE: 95 MMHG | HEIGHT: 69 IN | BODY MASS INDEX: 25.33 KG/M2 | RESPIRATION RATE: 20 BRPM

## 2021-03-29 DIAGNOSIS — M79.89 MASS OF SOFT TISSUE: ICD-10-CM

## 2021-03-29 DIAGNOSIS — G89.29 CHRONIC RIGHT-SIDED HEADACHES: Primary | ICD-10-CM

## 2021-03-29 DIAGNOSIS — R51.9 CHRONIC RIGHT-SIDED HEADACHES: Primary | ICD-10-CM

## 2021-03-29 DIAGNOSIS — R51.9 CHRONIC RIGHT-SIDED HEADACHES: ICD-10-CM

## 2021-03-29 DIAGNOSIS — G89.29 CHRONIC RIGHT-SIDED HEADACHES: ICD-10-CM

## 2021-03-29 PROCEDURE — 76536 US EXAM OF HEAD AND NECK: CPT

## 2021-03-29 PROCEDURE — 99214 OFFICE O/P EST MOD 30 MIN: CPT | Performed by: NURSE PRACTITIONER

## 2021-03-29 PROCEDURE — 70450 CT HEAD/BRAIN W/O DYE: CPT

## 2021-03-29 NOTE — PROGRESS NOTES
"Chief Complaint  Cyst (knot to back of head)    Subjective    History of Present Illness      Patient presents to [facility] for   Pt states that he has a knot to the back of head for at least 2 years. Pt states that it's getting bigger and it hurts.    Cyst  This is a new problem. The current episode started more than 1 year ago. The problem occurs constantly. Pertinent negatives include no abdominal pain, arthralgias, chest pain, coughing, diaphoresis, fatigue, fever, myalgias, numbness, rash, sore throat, vomiting or weakness.        Review of Systems   Constitutional: Negative for appetite change, diaphoresis, fatigue and fever.   HENT: Negative for ear pain, hearing loss, mouth sores, sinus pressure, sneezing, sore throat and voice change.    Eyes: Negative for discharge, itching and visual disturbance.   Respiratory: Negative for cough, shortness of breath and wheezing.    Cardiovascular: Negative for chest pain and palpitations.   Gastrointestinal: Negative for abdominal pain, diarrhea and vomiting.   Musculoskeletal: Negative for arthralgias, back pain and myalgias.   Skin: Negative for rash and bruise.   Neurological: Positive for headache. Negative for dizziness, seizures, weakness and numbness.   Hematological: Negative for adenopathy. Does not bruise/bleed easily.   Psychiatric/Behavioral: Negative for agitation, dysphoric mood, sleep disturbance and depressed mood. The patient is not nervous/anxious.        I have reviewed and agree with the HPI and ROS information as above.  Shirley Haines, APRN     Objective   Vital Signs:   BP 95/67   Pulse 73   Temp 97.3 °F (36.3 °C)   Resp 20   Ht 175.3 cm (69\")   Wt 77.6 kg (171 lb)   BMI 25.25 kg/m²       Physical Exam  Vitals and nursing note reviewed.   Constitutional:       Appearance: Normal appearance. He is well-developed.   HENT:      Head: Normocephalic and atraumatic.      Right Ear: Tympanic membrane, ear canal " and external ear normal.      Left Ear: Tympanic membrane, ear canal and external ear normal.      Nose: Nose normal. No septal deviation, nasal tenderness or congestion.      Mouth/Throat:      Lips: Pink. No lesions.      Mouth: Mucous membranes are moist. No oral lesions.      Dentition: Normal dentition.      Pharynx: Oropharynx is clear. No pharyngeal swelling, oropharyngeal exudate or posterior oropharyngeal erythema.   Eyes:      General: Lids are normal. Vision grossly intact. No scleral icterus.        Right eye: No discharge.         Left eye: No discharge.      Extraocular Movements: Extraocular movements intact.      Conjunctiva/sclera: Conjunctivae normal.      Right eye: Right conjunctiva is not injected.      Left eye: Left conjunctiva is not injected.      Pupils: Pupils are equal, round, and reactive to light.   Neck:      Thyroid: No thyroid mass.      Trachea: Trachea normal.   Cardiovascular:      Rate and Rhythm: Normal rate and regular rhythm.      Heart sounds: Normal heart sounds. No murmur heard.   No gallop.    Pulmonary:      Effort: Pulmonary effort is normal.      Breath sounds: Normal breath sounds and air entry. No wheezing, rhonchi or rales.   Musculoskeletal:         General: No tenderness or deformity. Normal range of motion.      Cervical back: Full passive range of motion without pain, normal range of motion and neck supple.      Thoracic back: Normal.      Right lower leg: No edema.      Left lower leg: No edema.   Skin:     General: Skin is warm and dry.      Coloration: Skin is not jaundiced.      Findings: No rash.   Neurological:      Mental Status: He is alert and oriented to person, place, and time.      Cranial Nerves: Cranial nerves are intact.      Sensory: Sensation is intact.      Motor: Motor function is intact.      Coordination: Coordination is intact.      Gait: Gait is intact.      Deep Tendon Reflexes: Reflexes are normal and symmetric.   Psychiatric:          "Mood and Affect: Mood and affect normal.         Behavior: Behavior normal.         Judgment: Judgment normal.          Result Review  Data Reviewed:   The following data was reviewed by: BRITNEY Edwards on 03/29/2021:    Data reviewed: Radiologic studies Ct head and US .    US Head Neck Soft Tissue (03/29/2021 08:53)  CT Head Without Contrast (03/29/2021 08:46)           Assessment and Plan        Problem List Items Addressed This Visit     None      Visit Diagnoses     Chronic right-sided headaches    -  Primary    Relevant Orders    CT Head Without Contrast (Completed)    Mass of soft tissue        Relevant Orders    US Head Neck Soft Tissue (Completed)      Patient presents today with complaints of a \"knot\" to the back right side of his head. This knot has been there for at least 4 years. A Us and CT were completed in 2018 and there were no remarkable findings. There was also a MRI Brain in 2019 with unremarkable findings. Patient states that the knot has gotten bigger and it is tender. Patient also states that he has constant headaches. Will get another Us and Ct completed at this time for reassurance.   The CT and Us of the area were reviewed and discussed. No abnormal findings noted on either exam. Discussed with patient and mother and they both v/u.     Follow Up   Return if symptoms worsen or fail to improve.  Patient was given instructions and counseling regarding his condition or for health maintenance advice. Please see specific information pulled into the AVS if appropriate.       "

## 2021-05-25 ENCOUNTER — HOSPITAL ENCOUNTER (EMERGENCY)
Facility: HOSPITAL | Age: 56
Discharge: HOME | End: 2021-05-25
Admitting: EMERGENCY MEDICINE

## 2021-05-25 ENCOUNTER — APPOINTMENT (OUTPATIENT)
Dept: CT IMAGING | Facility: HOSPITAL | Age: 56
End: 2021-05-25

## 2021-05-25 VITALS
OXYGEN SATURATION: 100 % | HEIGHT: 69 IN | RESPIRATION RATE: 18 BRPM | TEMPERATURE: 98 F | BODY MASS INDEX: 24.88 KG/M2 | SYSTOLIC BLOOD PRESSURE: 118 MMHG | DIASTOLIC BLOOD PRESSURE: 78 MMHG | WEIGHT: 168 LBS | HEART RATE: 80 BPM

## 2021-05-25 DIAGNOSIS — N39.0 URINARY TRACT INFECTION WITH HEMATURIA, SITE UNSPECIFIED: ICD-10-CM

## 2021-05-25 DIAGNOSIS — N20.0 NEPHROLITHIASIS: Primary | ICD-10-CM

## 2021-05-25 DIAGNOSIS — R31.9 URINARY TRACT INFECTION WITH HEMATURIA, SITE UNSPECIFIED: ICD-10-CM

## 2021-05-25 LAB
ALBUMIN SERPL-MCNC: 4.5 G/DL (ref 3.5–5.2)
ALBUMIN/GLOB SERPL: 1.7 G/DL
ALP SERPL-CCNC: 30 U/L (ref 39–117)
ALT SERPL W P-5'-P-CCNC: 23 U/L (ref 1–41)
AMPHET+METHAMPHET UR QL: NEGATIVE
AMPHETAMINES UR QL: NEGATIVE
ANION GAP SERPL CALCULATED.3IONS-SCNC: 13 MMOL/L (ref 5–15)
AST SERPL-CCNC: 26 U/L (ref 1–40)
BACTERIA UR QL AUTO: ABNORMAL /HPF
BARBITURATES UR QL SCN: NEGATIVE
BENZODIAZ UR QL SCN: NEGATIVE
BILIRUB SERPL-MCNC: 0.5 MG/DL (ref 0–1.2)
BILIRUB UR QL STRIP: NEGATIVE
BUN SERPL-MCNC: 31 MG/DL (ref 6–20)
BUN/CREAT SERPL: 26.1 (ref 7–25)
BUPRENORPHINE SERPL-MCNC: NEGATIVE NG/ML
CALCIUM SPEC-SCNC: 9.1 MG/DL (ref 8.6–10.5)
CANNABINOIDS SERPL QL: NEGATIVE
CHLORIDE SERPL-SCNC: 106 MMOL/L (ref 98–107)
CLARITY UR: ABNORMAL
CO2 SERPL-SCNC: 23 MMOL/L (ref 22–29)
COCAINE UR QL: NEGATIVE
COLOR UR: ABNORMAL
CREAT SERPL-MCNC: 1.19 MG/DL (ref 0.76–1.27)
DEPRECATED RDW RBC AUTO: 39.3 FL (ref 37–54)
ERYTHROCYTE [DISTWIDTH] IN BLOOD BY AUTOMATED COUNT: 12.5 % (ref 12.3–15.4)
GFR SERPL CREATININE-BSD FRML MDRD: 64 ML/MIN/1.73
GLOBULIN UR ELPH-MCNC: 2.6 GM/DL
GLUCOSE SERPL-MCNC: 145 MG/DL (ref 65–99)
GLUCOSE UR STRIP-MCNC: NEGATIVE MG/DL
HCT VFR BLD AUTO: 44.1 % (ref 37.5–51)
HGB BLD-MCNC: 15.1 G/DL (ref 13–17.7)
HGB UR QL STRIP.AUTO: ABNORMAL
HOLD SPECIMEN: NORMAL
HYALINE CASTS UR QL AUTO: ABNORMAL /LPF
KETONES UR QL STRIP: ABNORMAL
LEUKOCYTE ESTERASE UR QL STRIP.AUTO: ABNORMAL
LIPASE SERPL-CCNC: 19 U/L (ref 13–60)
LYMPHOCYTES # BLD MANUAL: 0.44 10*3/MM3 (ref 0.7–3.1)
LYMPHOCYTES NFR BLD MANUAL: 1.6 % (ref 5–12)
LYMPHOCYTES NFR BLD MANUAL: 4 % (ref 19.6–45.3)
MCH RBC QN AUTO: 29.5 PG (ref 26.6–33)
MCHC RBC AUTO-ENTMCNC: 34.2 G/DL (ref 31.5–35.7)
MCV RBC AUTO: 86.1 FL (ref 79–97)
METHADONE UR QL SCN: NEGATIVE
MONOCYTES # BLD AUTO: 0.18 10*3/MM3 (ref 0.1–0.9)
NEUTROPHILS # BLD AUTO: 10.4 10*3/MM3 (ref 1.7–7)
NEUTROPHILS NFR BLD MANUAL: 88 % (ref 42.7–76)
NEUTS BAND NFR BLD MANUAL: 6.4 % (ref 0–5)
NITRITE UR QL STRIP: NEGATIVE
OPIATES UR QL: NEGATIVE
OXYCODONE UR QL SCN: NEGATIVE
PCP UR QL SCN: NEGATIVE
PH UR STRIP.AUTO: 5.5 [PH] (ref 5–8)
PLAT MORPH BLD: NORMAL
PLATELET # BLD AUTO: 293 10*3/MM3 (ref 140–450)
PMV BLD AUTO: 9.6 FL (ref 6–12)
POTASSIUM SERPL-SCNC: 4.1 MMOL/L (ref 3.5–5.2)
PROPOXYPH UR QL: NEGATIVE
PROT SERPL-MCNC: 7.1 G/DL (ref 6–8.5)
PROT UR QL STRIP: ABNORMAL
RBC # BLD AUTO: 5.12 10*6/MM3 (ref 4.14–5.8)
RBC # UR: ABNORMAL /HPF
RBC MORPH BLD: NORMAL
REF LAB TEST METHOD: ABNORMAL
SODIUM SERPL-SCNC: 142 MMOL/L (ref 136–145)
SP GR UR STRIP: >1.03 (ref 1–1.03)
SQUAMOUS #/AREA URNS HPF: ABNORMAL /HPF
TRICYCLICS UR QL SCN: POSITIVE
UROBILINOGEN UR QL STRIP: ABNORMAL
WBC # BLD AUTO: 11.02 10*3/MM3 (ref 3.4–10.8)
WBC MORPH BLD: NORMAL
WBC UR QL AUTO: ABNORMAL /HPF
WHOLE BLOOD HOLD SPECIMEN: NORMAL
WHOLE BLOOD HOLD SPECIMEN: NORMAL

## 2021-05-25 PROCEDURE — 25010000002 ONDANSETRON PER 1 MG: Performed by: NURSE PRACTITIONER

## 2021-05-25 PROCEDURE — 81001 URINALYSIS AUTO W/SCOPE: CPT | Performed by: NURSE PRACTITIONER

## 2021-05-25 PROCEDURE — 85025 COMPLETE CBC W/AUTO DIFF WBC: CPT | Performed by: NURSE PRACTITIONER

## 2021-05-25 PROCEDURE — 74177 CT ABD & PELVIS W/CONTRAST: CPT

## 2021-05-25 PROCEDURE — 87086 URINE CULTURE/COLONY COUNT: CPT | Performed by: NURSE PRACTITIONER

## 2021-05-25 PROCEDURE — 96374 THER/PROPH/DIAG INJ IV PUSH: CPT

## 2021-05-25 PROCEDURE — 25010000002 HYDROMORPHONE PER 4 MG: Performed by: NURSE PRACTITIONER

## 2021-05-25 PROCEDURE — 99284 EMERGENCY DEPT VISIT MOD MDM: CPT

## 2021-05-25 PROCEDURE — 83690 ASSAY OF LIPASE: CPT | Performed by: NURSE PRACTITIONER

## 2021-05-25 PROCEDURE — 36415 COLL VENOUS BLD VENIPUNCTURE: CPT

## 2021-05-25 PROCEDURE — 85007 BL SMEAR W/DIFF WBC COUNT: CPT | Performed by: NURSE PRACTITIONER

## 2021-05-25 PROCEDURE — 80053 COMPREHEN METABOLIC PANEL: CPT | Performed by: NURSE PRACTITIONER

## 2021-05-25 PROCEDURE — 96375 TX/PRO/DX INJ NEW DRUG ADDON: CPT

## 2021-05-25 PROCEDURE — 80306 DRUG TEST PRSMV INSTRMNT: CPT | Performed by: NURSE PRACTITIONER

## 2021-05-25 PROCEDURE — 25010000002 IOPAMIDOL 61 % SOLUTION: Performed by: NURSE PRACTITIONER

## 2021-05-25 RX ORDER — HYDROMORPHONE HYDROCHLORIDE 1 MG/ML
0.5 INJECTION, SOLUTION INTRAMUSCULAR; INTRAVENOUS; SUBCUTANEOUS ONCE
Status: COMPLETED | OUTPATIENT
Start: 2021-05-25 | End: 2021-05-25

## 2021-05-25 RX ORDER — ONDANSETRON 2 MG/ML
4 INJECTION INTRAMUSCULAR; INTRAVENOUS ONCE
Status: COMPLETED | OUTPATIENT
Start: 2021-05-25 | End: 2021-05-25

## 2021-05-25 RX ORDER — HYDROCODONE BITARTRATE AND ACETAMINOPHEN 5; 325 MG/1; MG/1
1 TABLET ORAL EVERY 4 HOURS PRN
Qty: 10 TABLET | Refills: 0 | Status: SHIPPED | OUTPATIENT
Start: 2021-05-25 | End: 2021-06-18

## 2021-05-25 RX ORDER — CEPHALEXIN 500 MG/1
500 CAPSULE ORAL 2 TIMES DAILY
Qty: 20 CAPSULE | Refills: 0 | Status: ON HOLD | OUTPATIENT
Start: 2021-05-25 | End: 2021-06-09

## 2021-05-25 RX ORDER — FAMOTIDINE 10 MG/ML
20 INJECTION, SOLUTION INTRAVENOUS ONCE
Status: COMPLETED | OUTPATIENT
Start: 2021-05-25 | End: 2021-05-25

## 2021-05-25 RX ORDER — ONDANSETRON 4 MG/1
4 TABLET, ORALLY DISINTEGRATING ORAL EVERY 6 HOURS PRN
Qty: 12 TABLET | Refills: 0 | Status: ON HOLD | OUTPATIENT
Start: 2021-05-25 | End: 2021-11-15

## 2021-05-25 RX ADMIN — IOPAMIDOL 100 ML: 612 INJECTION, SOLUTION INTRAVENOUS at 12:45

## 2021-05-25 RX ADMIN — SODIUM CHLORIDE 500 ML: 9 INJECTION, SOLUTION INTRAVENOUS at 11:52

## 2021-05-25 RX ADMIN — FAMOTIDINE 20 MG: 10 INJECTION INTRAVENOUS at 11:52

## 2021-05-25 RX ADMIN — ONDANSETRON HYDROCHLORIDE 4 MG: 2 SOLUTION INTRAMUSCULAR; INTRAVENOUS at 11:52

## 2021-05-25 RX ADMIN — HYDROMORPHONE HYDROCHLORIDE 0.5 MG: 1 INJECTION, SOLUTION INTRAMUSCULAR; INTRAVENOUS; SUBCUTANEOUS at 12:08

## 2021-05-25 NOTE — ED PROVIDER NOTES
"Subjective   Patient is a 56-year-old male with history significant for thyroid disease and GERD.  Presents to the ER with complaints of abdominal pain and nausea and vomiting.  Patient states he has had issues with nausea and vomiting x8 months.  He states \"no one seems to know what is wrong with me.\"  Upon further discussion it does not appear the patient has truly had any sort of work-up or CAT scan regarding his abdominal pain.  He reports sharp pain to his right lower quadrant that radiates across to the left abdomen.  He describes the pain is intermittent in nature.  He states the pain began again last night and reports several episodes of nausea with vomiting.  He denies any recorded fevers or diarrhea.  He denies any hematemesis.  Due to symptoms described he came to the ER for evaluation and treatment.          Review of Systems   Constitutional: Negative.  Negative for fever.   HENT: Negative.  Negative for congestion.    Eyes: Negative.    Respiratory: Negative.  Negative for cough and shortness of breath.    Cardiovascular: Negative.  Negative for chest pain.   Gastrointestinal: Positive for abdominal pain, nausea and vomiting. Negative for blood in stool, constipation and diarrhea.   Genitourinary: Negative.    Musculoskeletal: Negative.    Skin: Negative.    All other systems reviewed and are negative.      Past Medical History:   Diagnosis Date   • Disease of thyroid gland    • GERD (gastroesophageal reflux disease)        No Known Allergies    Past Surgical History:   Procedure Laterality Date   • ENDOSCOPY N/A 12/16/2019    Procedure: ESOPHAGOGASTRODUODENOSCOPY WITH ANESTHESIA;  Surgeon: Willian Bajwa DO;  Location: [facility] ENDOSCOPY;  Service: Gastroenterology   • GALLBLADDER SURGERY     • TONSILLECTOMY         Family History   Problem Relation Age of Onset   • No Known Problems Mother    • No Known Problems Father    • No Known Problems Sister    • No Known Problems Brother    • No Known " Problems Daughter    • No Known Problems Son    • Heart disease Maternal Grandmother    • No Known Problems Paternal Grandmother    • No Known Problems Maternal Aunt    • No Known Problems Paternal Aunt    • BRCA 1/2 Neg Hx    • Breast cancer Neg Hx    • Colon cancer Neg Hx    • Endometrial cancer Neg Hx    • Ovarian cancer Neg Hx        Social History     Socioeconomic History   • Marital status: Single     Spouse name: Not on file   • Number of children: Not on file   • Years of education: Not on file   • Highest education level: Not on file   Tobacco Use   • Smoking status: Former Smoker   • Smokeless tobacco: Never Used   Vaping Use   • Vaping Use: Never used   Substance and Sexual Activity   • Alcohol use: No   • Drug use: No   • Sexual activity: Defer           Objective   Physical Exam  Vitals and nursing note reviewed.   Constitutional:       General: He is not in acute distress.     Appearance: He is well-developed. He is not diaphoretic.   HENT:      Head: Normocephalic and atraumatic.      Right Ear: External ear normal.      Left Ear: External ear normal.      Nose: Nose normal.      Mouth/Throat:      Mouth: Mucous membranes are moist.      Pharynx: Oropharynx is clear.   Eyes:      General: No scleral icterus.     Conjunctiva/sclera: Conjunctivae normal.      Pupils: Pupils are equal, round, and reactive to light.   Neck:      Thyroid: No thyromegaly.      Vascular: No JVD.   Cardiovascular:      Rate and Rhythm: Normal rate and regular rhythm.      Heart sounds: Normal heart sounds. No murmur heard.     Pulmonary:      Effort: Pulmonary effort is normal. No respiratory distress.      Breath sounds: Normal breath sounds. No wheezing or rales.   Chest:      Chest wall: No tenderness.   Abdominal:      General: Bowel sounds are normal. There is no distension.      Palpations: Abdomen is soft. There is no mass.      Tenderness: There is abdominal tenderness. There is no guarding or rebound.      Comments:  Pain to palpation of the right lower quadrant, periumbilical, and left lower quadrant; bowel sounds noted all 4 quadrants   Musculoskeletal:         General: Normal range of motion.      Cervical back: Normal range of motion and neck supple.   Lymphadenopathy:      Cervical: No cervical adenopathy.   Skin:     General: Skin is warm and dry.      Capillary Refill: Capillary refill takes less than 2 seconds.      Coloration: Skin is not pale.      Findings: No erythema or rash.   Neurological:      General: No focal deficit present.      Mental Status: He is alert and oriented to person, place, and time.      Cranial Nerves: No cranial nerve deficit.      Coordination: Coordination normal.      Deep Tendon Reflexes: Reflexes are normal and symmetric.   Psychiatric:         Behavior: Behavior normal.         Thought Content: Thought content normal.         Judgment: Judgment normal.         Procedures           ED Course  ED Course as of May 25 1504   Tue May 25, 2021   1437 Patient's work-up reveals white count is 11.02, hemoglobin hematocrit is 15.1 and 44.1.  CMP BUN and creatinine is 31 and 1.19, potassium 4.9, liver enzymes are within normal limits ALT is 23 AST is 26.  Urinalysis shows small leukocytes, large blood.  31-50 white cells with 0 bacteria.  Urine drug screens positive for tricyclic antidepressants.    [TW]   1438 IMPRESSION:  1. Right nephrolithiasis.  2. Specifically, the appendix is normal.        [TW]   1438 Patient has no ureterolithiasis however he has a stone noted in the right pole of his kidney.  Uncertain if this is causing his pain which then causes nausea with vomiting.  He will need to follow-up with urology.  Explained to the patient once cleared by them he may need to follow-up with GI in the future as well.  Patient expressed understanding.    [TW]      ED Course User Index  [TW] Jennifer Olmedo, APRN                                           MDM  Number of Diagnoses or Management  Options  Nephrolithiasis: new and requires workup  Urinary tract infection with hematuria, site unspecified: new and requires workup     Amount and/or Complexity of Data Reviewed  Clinical lab tests: ordered and reviewed  Tests in the radiology section of CPT®: ordered and reviewed  Discuss the patient with other providers: yes    Risk of Complications, Morbidity, and/or Mortality  Presenting problems: moderate  Diagnostic procedures: moderate  Management options: moderate    Patient Progress  Patient progress: improved      Final diagnoses:   Nephrolithiasis   Urinary tract infection with hematuria, site unspecified       ED Disposition  ED Disposition     ED Disposition Condition Comment    Discharge Good           Sumeet Mckeon MD  [address] [id number] [phone]               Medication List      New Prescriptions    cephalexin 500 MG capsule  Commonly known as: KEFLEX  Take 1 capsule by mouth 2 (Two) Times a Day.     HYDROcodone-acetaminophen 5-325 MG per tablet  Commonly known as: NORCO  Take 1 tablet by mouth Every 4 (Four) Hours As Needed for Moderate Pain .     ondansetron ODT 4 MG disintegrating tablet  Commonly known as: ZOFRAN-ODT  Place 1 tablet on the tongue Every 6 (Six) Hours As Needed for Nausea.           Where to Get Your Medications      These medications were sent to [facility] [address] [place] [address] [address] [facility] [phone]  - [phone] [address] [address]    Phone: [phone]   · cephalexin 500 MG capsule  · HYDROcodone-acetaminophen 5-325 MG per tablet  · ondansetron ODT 4 MG disintegrating tablet          Jennifer Olmedo, APRN  05/25/21 150

## 2021-05-26 ENCOUNTER — NURSE TRIAGE (OUTPATIENT)
Dept: CALL CENTER | Facility: HOSPITAL | Age: 56
End: 2021-05-26

## 2021-05-26 LAB — BACTERIA SPEC AEROBE CULT: NORMAL

## 2021-05-26 NOTE — TELEPHONE ENCOUNTER
"    Reason for Disposition  • Requesting regular office appointment    Additional Information  • Negative: [1] Caller is not with the adult (patient) AND [2] reporting urgent symptoms  • Negative: Lab result questions  • Negative: Medication questions  • Negative: Caller can't be reached by phone  • Negative: Caller has already spoken to PCP or another triager  • Negative: RN needs further essential information from caller in order to complete triage    Answer Assessment - Initial Assessment Questions  1. REASON FOR CALL or QUESTION: \"What is your reason for calling today?\" or \"How can I best help you?\" or \"What question do you have that I can help answer?\"      Mother is caller. Son was evaluated at the ER last night and was recommended to see GI MD. Mother called GI office and has an appt for next week but she is wanting it sooner. Advised to call PCP for assistance.    Protocols used: INFORMATION ONLY CALL - NO TRIAGE-ADULT-      "

## 2021-06-02 ENCOUNTER — OFFICE VISIT (OUTPATIENT)
Dept: GASTROENTEROLOGY | Facility: CLINIC | Age: 56
End: 2021-06-02

## 2021-06-02 VITALS
SYSTOLIC BLOOD PRESSURE: 122 MMHG | BODY MASS INDEX: 24.88 KG/M2 | WEIGHT: 168 LBS | HEART RATE: 83 BPM | HEIGHT: 69 IN | OXYGEN SATURATION: 98 % | TEMPERATURE: 97.3 F | DIASTOLIC BLOOD PRESSURE: 68 MMHG

## 2021-06-02 DIAGNOSIS — R10.10 PAIN OF UPPER ABDOMEN: Primary | ICD-10-CM

## 2021-06-02 DIAGNOSIS — R19.8 ALTERED BOWEL FUNCTION: ICD-10-CM

## 2021-06-02 PROCEDURE — 99214 OFFICE O/P EST MOD 30 MIN: CPT | Performed by: NURSE PRACTITIONER

## 2021-06-02 RX ORDER — POLYETHYLENE GLYCOL 3350 17 G/17G
17 POWDER, FOR SOLUTION ORAL 2 TIMES DAILY
Qty: 850 G | Refills: 11 | Status: ON HOLD | OUTPATIENT
Start: 2021-06-02 | End: 2021-06-09

## 2021-06-09 ENCOUNTER — ANESTHESIA (OUTPATIENT)
Dept: GASTROENTEROLOGY | Facility: HOSPITAL | Age: 56
End: 2021-06-09

## 2021-06-09 ENCOUNTER — ANESTHESIA EVENT (OUTPATIENT)
Dept: GASTROENTEROLOGY | Facility: HOSPITAL | Age: 56
End: 2021-06-09

## 2021-06-09 ENCOUNTER — HOSPITAL ENCOUNTER (OUTPATIENT)
Facility: HOSPITAL | Age: 56
Setting detail: HOSPITAL OUTPATIENT SURGERY
Discharge: HOME | End: 2021-06-09
Attending: INTERNAL MEDICINE | Admitting: INTERNAL MEDICINE

## 2021-06-09 ENCOUNTER — TELEPHONE (OUTPATIENT)
Dept: GASTROENTEROLOGY | Facility: CLINIC | Age: 56
End: 2021-06-09

## 2021-06-09 VITALS
OXYGEN SATURATION: 100 % | TEMPERATURE: 97.2 F | DIASTOLIC BLOOD PRESSURE: 80 MMHG | WEIGHT: 164.2 LBS | BODY MASS INDEX: 24.32 KG/M2 | RESPIRATION RATE: 12 BRPM | HEIGHT: 69 IN | HEART RATE: 71 BPM | SYSTOLIC BLOOD PRESSURE: 114 MMHG

## 2021-06-09 DIAGNOSIS — R10.10 PAIN OF UPPER ABDOMEN: ICD-10-CM

## 2021-06-09 PROCEDURE — 45378 DIAGNOSTIC COLONOSCOPY: CPT | Performed by: INTERNAL MEDICINE

## 2021-06-09 PROCEDURE — 25010000002 PROPOFOL 10 MG/ML EMULSION: Performed by: NURSE ANESTHETIST, CERTIFIED REGISTERED

## 2021-06-09 RX ORDER — PROPOFOL 10 MG/ML
VIAL (ML) INTRAVENOUS AS NEEDED
Status: DISCONTINUED | OUTPATIENT
Start: 2021-06-09 | End: 2021-06-09 | Stop reason: SURG

## 2021-06-09 RX ORDER — SODIUM CHLORIDE 0.9 % (FLUSH) 0.9 %
10 SYRINGE (ML) INJECTION AS NEEDED
Status: DISCONTINUED | OUTPATIENT
Start: 2021-06-09 | End: 2021-06-09 | Stop reason: HOSPADM

## 2021-06-09 RX ORDER — LIDOCAINE HYDROCHLORIDE 10 MG/ML
0.5 INJECTION, SOLUTION EPIDURAL; INFILTRATION; INTRACAUDAL; PERINEURAL ONCE AS NEEDED
Status: CANCELLED | OUTPATIENT
Start: 2021-06-09

## 2021-06-09 RX ORDER — SODIUM CHLORIDE 9 MG/ML
500 INJECTION, SOLUTION INTRAVENOUS CONTINUOUS PRN
Status: DISCONTINUED | OUTPATIENT
Start: 2021-06-09 | End: 2021-06-09 | Stop reason: HOSPADM

## 2021-06-09 RX ADMIN — PROPOFOL 300 MG: 10 INJECTION, EMULSION INTRAVENOUS at 10:21

## 2021-06-09 RX ADMIN — SODIUM CHLORIDE 500 ML: 9 INJECTION, SOLUTION INTRAVENOUS at 10:05

## 2021-06-09 NOTE — ANESTHESIA PREPROCEDURE EVALUATION
Anesthesia Evaluation     Patient summary reviewed and Nursing notes reviewed   no history of anesthetic complications:    NPO Liquid Status: > 8 hours           Airway   Mallampati: II  TM distance: >3 FB  Neck ROM: full  Dental - normal exam     Pulmonary - negative pulmonary ROS and normal exam   Cardiovascular - normal exam  Exercise tolerance: excellent (>7 METS)    Beta blocker given within 24 hours of surgery    (+) dysrhythmias Tachycardia,       Neuro/Psych  (+) seizures (+20 years ago),     GI/Hepatic/Renal/Endo    (+)  GERD well controlled,  thyroid problem     Musculoskeletal     Abdominal    Substance History      OB/GYN          Other                        Anesthesia Plan    ASA 2     general     intravenous induction

## 2021-06-14 DIAGNOSIS — N20.0 NEPHROLITHIASIS: Primary | ICD-10-CM

## 2021-06-15 NOTE — PROGRESS NOTES
Subjective    Mr. Simms is 56 y.o. male    Chief Complaint: CT / KUB Non Obstructing Stone    History of Present Illness  Patient with prior history of kidney stones is here for follow-up with intermittent right mid back right abdominal pain.  Pain comes and goes and is described as stabbing type pain.  Denies any fever chills nausea vomiting or gross hematuria.  No decrease or changes in urine flow or stream.  Patient went to emergency department 05/25/2021 for abdominal pain CT scan was done which revealed a nonobstructing stone in the right kidney.  Did not show any ureteral stones or obstruction.    The following portions of the patient's history were reviewed and updated as appropriate: allergies, current medications, past family history, past medical history, past social history, past surgical history and problem list.    Review of Systems   Constitutional: Negative for appetite change and fever.   HENT: Negative for hearing loss and sore throat.    Eyes: Negative for pain and redness.   Respiratory: Negative for cough and shortness of breath.    Cardiovascular: Negative for chest pain and leg swelling.   Gastrointestinal: Positive for abdominal pain (Right lower abd pain). Negative for anal bleeding, nausea and vomiting.   Endocrine: Negative for cold intolerance and heat intolerance.   Genitourinary: Negative for dysuria, flank pain, frequency, hematuria and urgency.   Musculoskeletal: Negative for joint swelling and myalgias.   Skin: Negative for color change and rash.   Allergic/Immunologic: Negative for immunocompromised state.   Neurological: Negative for dizziness and speech difficulty.   Hematological: Negative for adenopathy. Does not bruise/bleed easily.   Psychiatric/Behavioral: Negative for dysphoric mood and suicidal ideas.         Current Outpatient Medications:   •  amitriptyline (ELAVIL) 50 MG tablet, Take 50 mg by mouth Every Night., Disp: , Rfl:   •  Euthyrox 75 MCG tablet, TAKE 1 TABLET BY  MOUTH ONCE DAILY IN THE MORNING ON AN EMPTY STOMACH, Disp: , Rfl:   •  fenofibrate (TRICOR) 145 MG tablet, Take 145 mg by mouth Every Night., Disp: , Rfl:   •  levothyroxine sodium (TIROSINT) 50 MCG capsule, Take 50 mcg by mouth Every Night., Disp: , Rfl:   •  metoprolol tartrate (LOPRESSOR) 100 MG tablet, Take 100 mg by mouth 2 (Two) Times a Day., Disp: , Rfl:   •  OLANZapine (zyPREXA) 10 MG tablet, Take 10 mg by mouth Every Night., Disp: , Rfl:   •  ondansetron ODT (ZOFRAN-ODT) 4 MG disintegrating tablet, Place 1 tablet on the tongue Every 6 (Six) Hours As Needed for Nausea., Disp: 12 tablet, Rfl: 0  •  pantoprazole (PROTONIX) 40 MG EC tablet, Take 40 mg by mouth Every Night., Disp: , Rfl:     Past Medical History:   Diagnosis Date   • Disease of thyroid gland    • GERD (gastroesophageal reflux disease)    • Hyperlipidemia    • Hypertension    • Irregular cardiac rhythm        Past Surgical History:   Procedure Laterality Date   • COLONOSCOPY N/A 6/9/2021    Procedure: COLONOSCOPY WITH ANESTHESIA;  Surgeon: Willian Bajwa DO;  Location: [facility] ENDOSCOPY;  Service: Gastroenterology;  Laterality: N/A;  preop; abdominal pain  postop; rectal ulcer   PCP Edgar Gomez    • ENDOSCOPY N/A 12/16/2019    Procedure: ESOPHAGOGASTRODUODENOSCOPY WITH ANESTHESIA;  Surgeon: Willian Bajwa DO;  Location: [facility] ENDOSCOPY;  Service: Gastroenterology   • GALLBLADDER SURGERY     • TONSILLECTOMY         Social History     Socioeconomic History   • Marital status: Single     Spouse name: Not on file   • Number of children: Not on file   • Years of education: Not on file   • Highest education level: Not on file   Tobacco Use   • Smoking status: Former Smoker     Types: Cigarettes   • Smokeless tobacco: Never Used   Vaping Use   • Vaping Use: Never used   Substance and Sexual Activity   • Alcohol use: No   • Drug use: No   • Sexual activity: Defer       Family History   Problem Relation Age of Onset   • Hypertension Mother    •  "Heart disease Mother    • Stroke Father    • No Known Problems Sister    • No Known Problems Brother    • No Known Problems Daughter    • No Known Problems Son    • Heart disease Maternal Grandmother    • No Known Problems Paternal Grandmother    • No Known Problems Maternal Aunt    • No Known Problems Paternal Aunt    • BRCA 1/2 Neg Hx    • Breast cancer Neg Hx    • Colon cancer Neg Hx    • Endometrial cancer Neg Hx    • Ovarian cancer Neg Hx    • Colon polyps Neg Hx        Objective    Temp 97.6 °F (36.4 °C) (Temporal)   Ht 175.3 cm (69\")   Wt 75.8 kg (167 lb)   BMI 24.66 kg/m²     Physical Exam  Vitals reviewed.   Constitutional:       Appearance: Normal appearance.   HENT:      Head: Normocephalic and atraumatic.      Right Ear: External ear normal.      Left Ear: External ear normal.      Nose: No congestion.   Eyes:      Conjunctiva/sclera: Conjunctivae normal.   Pulmonary:      Effort: Pulmonary effort is normal.   Abdominal:      General: There is no distension.      Palpations: Abdomen is soft.      Tenderness: There is no abdominal tenderness. There is no right CVA tenderness or left CVA tenderness.   Neurological:      Mental Status: He is alert. Mental status is at baseline.   Psychiatric:         Mood and Affect: Mood normal.         Behavior: Behavior normal.             Results for orders placed or performed in visit on 06/18/21   POC Urinalysis Dipstick, Multipro    Specimen: Urine   Result Value Ref Range    Color Yellow Yellow, Straw, Dark Yellow, Anai    Clarity, UA Clear Clear    Glucose, UA Negative Negative, 1000 mg/dL (3+) mg/dL    Bilirubin Negative Negative    Ketones, UA Negative Negative    Specific Gravity  1.020 1.005 - 1.030    Blood, UA Negative Negative    pH, Urine 7.0 5.0 - 8.0    Protein, POC Negative Negative mg/dL    Urobilinogen, UA Normal Normal    Nitrite, UA Negative Negative    Leukocytes Small (1+) (A) Negative       Independent review of a CT scan of abdomen and pelvis " without contrast  The CT scan of the abdomen/pelvis done without contrast is available for me to review.  Treatment recommendations require an independent review.  First I scanned the liver, spleen, and bowel pattern.  The retroperitoneum including the major vessels and lymphatic packages are briefly reviewed.  This film as been reviewed by the radiologist to determine any non urologic abnormalities that are present.  The kidneys are closely inspected for size, symmetry, contour, parenchymal thickness, perinephric reaction, presence of calcifications, and intrarenal dilation of the collecting system.  The ureters are inspected for their course, caliber, and any calcifications.  The bladder is inspected for its thickness, size, and presence of any calcifications.  This scan shows 4-5 mm stone right upper pole.    KUB independent review    A KUB is available for me to review today.  The image is inspected for a bowel gas pattern and the general bone structure of the spine and pelvis. The kidneys are then inspected closely.  Renal outline is noted if identifiable. The kidney, collecting system, and anticipated path of the ureter are examined for calcifications including those in the true pelvis.  This film reveals:    On the right there is a calcification consistent with stone seen on CT scan and believe it is the more proximal of the 2 calcifications seen on KUB.    On the left there are no calcificaitons seen in the kidney or the expected course of the ureter.        Assessment and Plan    Diagnoses and all orders for this visit:    1. Nephrolithiasis (Primary)  -     POC Urinalysis Dipstick, Multipro  -     Case Request; Standing  -     Protime-INR; Future  -     ceFAZolin (ANCEF) 2 g in sodium chloride 0.9 % 100 mL IVPB  -     Case Request    2. Other specified disorders of kidney and ureter   -     Protime-INR; Future    Other orders  -     Follow Anesthesia Guidelines / Standing Orders; Future  -     Provide NPO  Instructions to Patient; Future  -     Follow Anesthesia Guidelines / Protocol; Standing  -     Verify NPO Status; Standing  -     Obtain Informed Consent; Standing  -     Verify / Perform Chlorhexidine Skin Prep; Standing  -     XR Abdomen KUB; Standing    Patient who has been having intermittent right pain radiating to right mid was seen in the emergency department last scan showed a nonobstructing right upper pole stone.  There is no ureteral stone or hydronephrosis.  There are 2 calcifications in the vicinity of the right kidney the more proximal calcification I believe is the stone seen on CT.  We will schedule him for right ESWL and I did explain that he may still have pain after the ESWL. They are aware of this.

## 2021-06-18 ENCOUNTER — OFFICE VISIT (OUTPATIENT)
Dept: UROLOGY | Facility: CLINIC | Age: 56
End: 2021-06-18

## 2021-06-18 ENCOUNTER — HOSPITAL ENCOUNTER (OUTPATIENT)
Dept: GENERAL RADIOLOGY | Facility: HOSPITAL | Age: 56
Discharge: HOME | End: 2021-06-18
Admitting: PHYSICIAN ASSISTANT

## 2021-06-18 VITALS — TEMPERATURE: 97.6 F | WEIGHT: 167 LBS | HEIGHT: 69 IN | BODY MASS INDEX: 24.73 KG/M2

## 2021-06-18 DIAGNOSIS — N20.0 NEPHROLITHIASIS: ICD-10-CM

## 2021-06-18 DIAGNOSIS — N28.89 OTHER SPECIFIED DISORDERS OF KIDNEY AND URETER: ICD-10-CM

## 2021-06-18 DIAGNOSIS — N20.0 NEPHROLITHIASIS: Primary | ICD-10-CM

## 2021-06-18 LAB
BILIRUB BLD-MCNC: NEGATIVE MG/DL
CLARITY, POC: CLEAR
COLOR UR: YELLOW
GLUCOSE UR STRIP-MCNC: NEGATIVE MG/DL
KETONES UR QL: NEGATIVE
LEUKOCYTE EST, POC: ABNORMAL
NITRITE UR-MCNC: NEGATIVE MG/ML
PH UR: 7 [PH] (ref 5–8)
PROT UR STRIP-MCNC: NEGATIVE MG/DL
RBC # UR STRIP: NEGATIVE /UL
SP GR UR: 1.02 (ref 1–1.03)
UROBILINOGEN UR QL: NORMAL

## 2021-06-18 PROCEDURE — 81003 URINALYSIS AUTO W/O SCOPE: CPT | Performed by: PHYSICIAN ASSISTANT

## 2021-06-18 PROCEDURE — 74018 RADEX ABDOMEN 1 VIEW: CPT

## 2021-06-18 PROCEDURE — 99214 OFFICE O/P EST MOD 30 MIN: CPT | Performed by: PHYSICIAN ASSISTANT

## 2021-06-18 RX ORDER — LEVOTHYROXINE SODIUM 75 UG/1
75 TABLET ORAL
COMMUNITY
Start: 2021-05-13 | End: 2023-02-09

## 2021-06-22 ENCOUNTER — TELEPHONE (OUTPATIENT)
Dept: UROLOGY | Facility: CLINIC | Age: 56
End: 2021-06-22

## 2021-06-22 NOTE — TELEPHONE ENCOUNTER
Called pt and gave surgery information with dates time and instructions. Pt verbalized understanding.

## 2021-07-23 ENCOUNTER — TRANSCRIBE ORDERS (OUTPATIENT)
Dept: LAB | Facility: HOSPITAL | Age: 56
End: 2021-07-23

## 2021-07-23 DIAGNOSIS — Z11.59 SCREENING FOR VIRAL DISEASE: Primary | ICD-10-CM

## 2021-07-26 ENCOUNTER — PRE-ADMISSION TESTING (OUTPATIENT)
Dept: PREADMISSION TESTING | Facility: HOSPITAL | Age: 56
End: 2021-07-26

## 2021-07-26 LAB
ANION GAP SERPL CALCULATED.3IONS-SCNC: 10 MMOL/L (ref 5–15)
BUN SERPL-MCNC: 17 MG/DL (ref 6–20)
BUN/CREAT SERPL: 16 (ref 7–25)
CALCIUM SPEC-SCNC: 9.6 MG/DL (ref 8.6–10.5)
CHLORIDE SERPL-SCNC: 104 MMOL/L (ref 98–107)
CO2 SERPL-SCNC: 25 MMOL/L (ref 22–29)
CREAT SERPL-MCNC: 1.06 MG/DL (ref 0.76–1.27)
DEPRECATED RDW RBC AUTO: 38.5 FL (ref 37–54)
ERYTHROCYTE [DISTWIDTH] IN BLOOD BY AUTOMATED COUNT: 12.1 % (ref 12.3–15.4)
GFR SERPL CREATININE-BSD FRML MDRD: 73 ML/MIN/1.73
GLUCOSE SERPL-MCNC: 100 MG/DL (ref 65–99)
HCT VFR BLD AUTO: 41.3 % (ref 37.5–51)
HGB BLD-MCNC: 14.3 G/DL (ref 13–17.7)
MCH RBC QN AUTO: 30.1 PG (ref 26.6–33)
MCHC RBC AUTO-ENTMCNC: 34.6 G/DL (ref 31.5–35.7)
MCV RBC AUTO: 86.9 FL (ref 79–97)
PLATELET # BLD AUTO: 252 10*3/MM3 (ref 140–450)
PMV BLD AUTO: 9.6 FL (ref 6–12)
POTASSIUM SERPL-SCNC: 4.1 MMOL/L (ref 3.5–5.2)
RBC # BLD AUTO: 4.75 10*6/MM3 (ref 4.14–5.8)
SODIUM SERPL-SCNC: 139 MMOL/L (ref 136–145)
WBC # BLD AUTO: 4.87 10*3/MM3 (ref 3.4–10.8)

## 2021-07-26 PROCEDURE — 80048 BASIC METABOLIC PNL TOTAL CA: CPT

## 2021-07-26 PROCEDURE — 93005 ELECTROCARDIOGRAM TRACING: CPT

## 2021-07-26 PROCEDURE — 85027 COMPLETE CBC AUTOMATED: CPT

## 2021-07-26 PROCEDURE — 93010 ELECTROCARDIOGRAM REPORT: CPT | Performed by: INTERNAL MEDICINE

## 2021-07-26 PROCEDURE — 85610 PROTHROMBIN TIME: CPT | Performed by: PHYSICIAN ASSISTANT

## 2021-07-26 RX ORDER — ASPIRIN 81 MG/1
81 TABLET ORAL DAILY
COMMUNITY

## 2021-07-26 NOTE — DISCHARGE INSTRUCTIONS
DAY OF SURGERY INSTRUCTIONS        YOUR SURGEON: DR. DAVID GARNER    PROCEDURE: EXTRACORPOREAL SHOCKWAVE LITHOTRIPSY RIGHT    DATE OF SURGERY: AUGUST 2, 2021    ARRIVAL TIME: AS DIRECTED BY OFFICE    YOU MAY TAKE THE FOLLOWING MEDICATION(S) THE MORNING OF SURGERY WITH A SIP OF WATER: METOPROLOL    ALL OTHER HOME MEDICATIONS CHECK WITH YOUR DOCTOR    DO NOT TAKE ANY ERECTILE DYSFUNCTION MEDICATIONS (EX:  CIALIS, VIAGRA) 24 HOURS PRIOR TO SURGERY              MANAGING PAIN AFTER SURGERY    We know you are probably wondering what your pain will be like after surgery.  Following surgery it is unrealistic to expect you will not have pain.   Pain is how our bodies let us know that something is wrong or cautions us to be careful.  That said, our goal is to make your pain tolerable.    Methods we may use to treat your pain include (oral or IV medications, PCAs, epidurals, nerve blocks, etc.)   While some procedures require IV pain medications for a short time after surgery, transitioning to pain medications by mouth allows for better management of pain.   Your nurse will encourage you to take oral pain medications whenever possible.  IV medications work almost immediately, but only last a short while.  Taking medications by mouth allows for a more constant level of medication in your blood stream for a longer period of time.      Once your pain is out of control it is harder to get back under control.  It is important you are aware when your next dose of pain medication is due.  If you are admitted, your nurse may write the time of your next dose on the white board in your room to help you remember.      We are interested in your pain and encourage you to inform us about aggravating factors during your visit.   Many times a simple repositioning every few hours can make a big difference.    If your physician says it is okay, do not let your pain prevent you from getting out of bed. Be sure to call your nurse for assistance  prior to getting up so you do not fall.      Before surgery, please decide your tolerable pain goal.  These faces help describe the pain ratings we use on a 0-10 scale.   Be prepared to tell us your goal and whether or not you take pain or anxiety medications at home.      BEFORE YOU COME TO THE HOSPITAL  (Pre-op instructions)  • Do not eat, drink, smoke or chew gum after midnight the night before surgery.  This also includes no mints.  • Morning of surgery take only the medicines you have been instructed with a sip of water unless otherwise instructed  by your physician.  • Do not shave, wear makeup or dark nail polish.  • Remove all jewelry including rings.  • Leave anything you consider valuable at home.  • Leave your suitcase in the car until after your surgery.  • Bring the following with you if applicable:  o Picture ID and insurance, Medicare or Medicaid cards  o Co-pay/deductible required by insurance (cash, check, credit card)  o Copy of advance directive, living will or power-of- documents if not brought to PAT  o CPAP or BIPAP mask and tubing  o Relaxation aids ( book, magazine), etc.  o Hearing aids                                 ON THE DAY OF SURGERY  · On the day of surgery check in at registration located at the main entrance of the hospital.   ? You will be registered and given a beeper with instructions where to wait in the main lobby.  ? When your beeper lights up and vibrates a member of the Outpatient Surgery staff will meet you at the double doors under the stair steps and escort you to your preoperative room.   · You may have cloth compression devices placed on your legs. These help to prevent blood clots and reduce swelling in your legs.  · An IV may be inserted into one of your veins.  · In the operating room, you may be given one or more of the following:  ? A medicine to help you relax (sedative).  ? A medicine to numb the area (local anesthetic).  ? A medicine to make you fall  "asleep (general anesthetic).  ? A medicine that is injected into an area of your body to numb everything below the injection site (regional anesthetic).  · Your surgical site will be marked or identified.  · You may be given an antibiotic through your IV to help prevent infection.  Contact a health care provider if you:  · Develop a fever of more than 100.4°F (38°C) or other feelings of illness during the 48 hours before your surgery.  · Have symptoms that get worse.  Have questions or concerns about your surgery    General Anesthesia/Surgery, Adult  General anesthesia is the use of medicines to make a person \"go to sleep\" (unconscious) for a medical procedure. General anesthesia must be used for certain procedures, and is often recommended for procedures that:  · Last a long time.  · Require you to be still or in an unusual position.  · Are major and can cause blood loss.  The medicines used for general anesthesia are called general anesthetics. As well as making you unconscious for a certain amount of time, these medicines:  · Prevent pain.  · Control your blood pressure.  · Relax your muscles.  Tell a health care provider about:  · Any allergies you have.  · All medicines you are taking, including vitamins, herbs, eye drops, creams, and over-the-counter medicines.  · Any problems you or family members have had with anesthetic medicines.  · Types of anesthetics you have had in the past.  · Any blood disorders you have.  · Any surgeries you have had.  · Any medical conditions you have.  · Any recent upper respiratory, chest, or ear infections.  · Any history of:  ? Heart or lung conditions, such as heart failure, sleep apnea, asthma, or chronic obstructive pulmonary disease (COPD).  ?  service.  ? Depression or anxiety.  · Any tobacco or drug use, including marijuana or alcohol use.  · Whether you are pregnant or may be pregnant.  What are the risks?  Generally, this is a safe procedure. However, problems " may occur, including:  · Allergic reaction.  · Lung and heart problems.  · Inhaling food or liquid from the stomach into the lungs (aspiration).  · Nerve injury.  · Air in the bloodstream, which can lead to stroke.  · Extreme agitation or confusion (delirium) when you wake up from the anesthetic.  · Waking up during your procedure and being unable to move. This is rare.  These problems are more likely to develop if you are having a major surgery or if you have an advanced or serious medical condition. You can prevent some of these complications by answering all of your health care provider's questions thoroughly and by following all instructions before your procedure.  General anesthesia can cause side effects, including:  · Nausea or vomiting.  · A sore throat from the breathing tube.  · Hoarseness.  · Wheezing or coughing.  · Shaking chills.  · Tiredness.  · Body aches.  · Anxiety.  · Sleepiness or drowsiness.  · Confusion or agitation.  RISKS AND COMPLICATIONS OF SURGERY  Your health care provider will discuss possible risks and complications with you before surgery. Common risks and complications include:    · Problems due to the use of anesthetics.  · Blood loss and replacement (does not apply to minor surgical procedures).  · Temporary increase in pain due to surgery.  · Uncorrected pain or problems that the surgery was meant to correct.  · Infection.  · New damage.    What happens before the procedure?    Medicines  Ask your health care provider about:  · Changing or stopping your regular medicines. This is especially important if you are taking diabetes medicines or blood thinners.  · Taking medicines such as aspirin and ibuprofen. These medicines can thin your blood. Do not take these medicines unless your health care provider tells you to take them.  · Taking over-the-counter medicines, vitamins, herbs, and supplements. Do not take these during the week before your procedure unless your health care  provider approves them.  General instructions  · Starting 3-6 weeks before the procedure, do not use any products that contain nicotine or tobacco, such as cigarettes and e-cigarettes. If you need help quitting, ask your health care provider.  · If you brush your teeth on the morning of the procedure, make sure to spit out all of the toothpaste.  · Tell your health care provider if you become ill or develop a cold, cough, or fever.  · If instructed by your health care provider, bring your sleep apnea device with you on the day of your surgery (if applicable).  · Ask your health care provider if you will be going home the same day, the following day, or after a longer hospital stay.  ? Plan to have someone take you home from the hospital or clinic.  ? Plan to have a responsible adult care for you for at least 24 hours after you leave the hospital or clinic. This is important.  What happens during the procedure?  · You will be given anesthetics through both of the following:  ? A mask placed over your nose and mouth.  ? An IV in one of your veins.  · You may receive a medicine to help you relax (sedative).  · After you are unconscious, a breathing tube may be inserted down your throat to help you breathe. This will be removed before you wake up.  · An anesthesia specialist will stay with you throughout your procedure. He or she will:  ? Keep you comfortable and safe by continuing to give you medicines and adjusting the amount of medicine that you get.  ? Monitor your blood pressure, pulse, and oxygen levels to make sure that the anesthetics do not cause any problems.  The procedure may vary among health care providers and hospitals.  What happens after the procedure?  · Your blood pressure, temperature, heart rate, breathing rate, and blood oxygen level will be monitored until the medicines you were given have worn off.  · You will wake up in a recovery area. You may wake up slowly.  · If you feel anxious or agitated,  you may be given medicine to help you calm down.  · If you will be going home the same day, your health care provider may check to make sure you can walk, drink, and urinate.  · Your health care provider will treat any pain or side effects you have before you go home.  · Do not drive for 24 hours if you were given a sedative.  Summary  · General anesthesia is used to keep you still and prevent pain during a procedure.  · It is important to tell your healthcare provider about your medical history and any surgeries you have had, and previous experience with anesthesia.  · Follow your healthcare provider’s instructions about when to stop eating, drinking, or taking certain medicines before your procedure.  · Plan to have someone take you home from the hospital or clinic.  This information is not intended to replace advice given to you by your health care provider. Make sure you discuss any questions you have with your health care provider.  Document Released: 03/26/2009 Document Revised: 08/03/2018 Document Reviewed: 08/03/2018  [employer] Interactive Patient Education © 2019 [employer]      Fall Prevention in Hospitals, Adult  As a hospital patient, your condition and the treatments you receive can increase your risk for falls. Some additional risk factors for falls in a hospital include:  · Being in an unfamiliar environment.  · Being on bed rest.  · Your surgery.  · Taking certain medicines.  · Your tubing requirements, such as intravenous (IV) therapy or catheters.  It is important that you learn how to decrease fall risks while at the hospital. Below are important tips that can help prevent falls.  SAFETY TIPS FOR PREVENTING FALLS  Talk about your risk of falling.  · Ask your health care provider why you are at risk for falling. Is it your medicine, illness, tubing placement, or something else?  · Make a plan with your health care provider to keep you safe from falls.  · Ask your health care provider or  pharmacist about side effects of your medicines. Some medicines can make you dizzy or affect your coordination.  Ask for help.  · Ask for help before getting out of bed. You may need to press your call button.  · Ask for assistance in getting safely to the toilet.  · Ask for a walker or cane to be put at your bedside. Ask that most of the side rails on your bed be placed up before your health care provider leaves the room.  · Ask family or friends to sit with you.  · Ask for things that are out of your reach, such as your glasses, hearing aids, telephone, bedside table, or call button.  Follow these tips to avoid falling:  · Stay lying or seated, rather than standing, while waiting for help.  · Wear rubber-soled slippers or shoes whenever you walk in the hospital.  · Avoid quick, sudden movements.  ¨ Change positions slowly.  ¨ Sit on the side of your bed before standing.  ¨ Stand up slowly and wait before you start to walk.  · Let your health care provider know if there is a spill on the floor.  · Pay careful attention to the medical equipment, electrical cords, and tubes around you.  · When you need help, use your call button by your bed or in the bathroom. Wait for one of your health care providers to help you.  · If you feel dizzy or unsure of your footing, return to bed and wait for assistance.  · Avoid being distracted by the TV, telephone, or another person in your room.  · Do not lean or support yourself on rolling objects, such as IV poles or bedside tables.     This information is not intended to replace advice given to you by your health care provider. Make sure you discuss any questions you have with your health care provider.     Document Released: 12/15/2001 Document Revised: 01/08/2016 Document Reviewed: 08/25/2013  [employer] Interactive Patient Education ©2016 [employer]     PATIENT/FAMILY/RESPONSIBLE PARTY VERBALIZES UNDERSTANDING OF ABOVE EDUCATION.  COPY OF PAIN SCALE GIVEN AND REVIEWED WITH  VERBALIZED UNDERSTANDING.

## 2021-07-27 LAB
QT INTERVAL: 360 MS
QTC INTERVAL: 391 MS

## 2021-07-30 ENCOUNTER — LAB (OUTPATIENT)
Dept: LAB | Facility: HOSPITAL | Age: 56
End: 2021-07-30

## 2021-07-30 LAB — SARS-COV-2 ORF1AB RESP QL NAA+PROBE: NOT DETECTED

## 2021-07-30 PROCEDURE — U0005 INFEC AGEN DETEC AMPLI PROBE: HCPCS | Performed by: UROLOGY

## 2021-07-30 PROCEDURE — U0004 COV-19 TEST NON-CDC HGH THRU: HCPCS | Performed by: UROLOGY

## 2021-07-30 PROCEDURE — C9803 HOPD COVID-19 SPEC COLLECT: HCPCS | Performed by: UROLOGY

## 2021-08-02 ENCOUNTER — ANESTHESIA EVENT (OUTPATIENT)
Dept: PERIOP | Facility: HOSPITAL | Age: 56
End: 2021-08-02

## 2021-08-02 ENCOUNTER — HOSPITAL ENCOUNTER (OUTPATIENT)
Facility: HOSPITAL | Age: 56
Setting detail: HOSPITAL OUTPATIENT SURGERY
Discharge: HOME | End: 2021-08-02
Attending: UROLOGY | Admitting: UROLOGY

## 2021-08-02 ENCOUNTER — APPOINTMENT (OUTPATIENT)
Dept: GENERAL RADIOLOGY | Facility: HOSPITAL | Age: 56
End: 2021-08-02

## 2021-08-02 ENCOUNTER — ANESTHESIA (OUTPATIENT)
Dept: PERIOP | Facility: HOSPITAL | Age: 56
End: 2021-08-02

## 2021-08-02 VITALS
DIASTOLIC BLOOD PRESSURE: 85 MMHG | RESPIRATION RATE: 16 BRPM | WEIGHT: 170.42 LBS | SYSTOLIC BLOOD PRESSURE: 134 MMHG | OXYGEN SATURATION: 96 % | BODY MASS INDEX: 25.17 KG/M2 | HEART RATE: 93 BPM | TEMPERATURE: 98.9 F

## 2021-08-02 DIAGNOSIS — N20.0 NEPHROLITHIASIS: ICD-10-CM

## 2021-08-02 PROCEDURE — 25010000002 DEXAMETHASONE PER 1 MG: Performed by: ANESTHESIOLOGY

## 2021-08-02 PROCEDURE — 25010000002 CEFAZOLIN PER 500 MG: Performed by: PHYSICIAN ASSISTANT

## 2021-08-02 PROCEDURE — 25010000002 MIDAZOLAM PER 1 MG: Performed by: ANESTHESIOLOGY

## 2021-08-02 PROCEDURE — 25010000002 PHENYLEPHRINE HCL 0.8 MG/10ML SOLUTION PREFILLED SYRINGE: Performed by: NURSE ANESTHETIST, CERTIFIED REGISTERED

## 2021-08-02 PROCEDURE — 25010000002 DEXAMETHASONE PER 1 MG: Performed by: NURSE ANESTHETIST, CERTIFIED REGISTERED

## 2021-08-02 PROCEDURE — 25010000002 FENTANYL CITRATE (PF) 100 MCG/2ML SOLUTION: Performed by: NURSE ANESTHETIST, CERTIFIED REGISTERED

## 2021-08-02 PROCEDURE — 50590 FRAGMENTING OF KIDNEY STONE: CPT | Performed by: UROLOGY

## 2021-08-02 PROCEDURE — 74018 RADEX ABDOMEN 1 VIEW: CPT

## 2021-08-02 PROCEDURE — 25010000002 ONDANSETRON PER 1 MG: Performed by: NURSE ANESTHETIST, CERTIFIED REGISTERED

## 2021-08-02 PROCEDURE — 25010000002 PROPOFOL 10 MG/ML EMULSION: Performed by: NURSE ANESTHETIST, CERTIFIED REGISTERED

## 2021-08-02 PROCEDURE — 25010000002 KETOROLAC TROMETHAMINE PER 15 MG: Performed by: NURSE ANESTHETIST, CERTIFIED REGISTERED

## 2021-08-02 RX ORDER — KETOROLAC TROMETHAMINE 30 MG/ML
INJECTION, SOLUTION INTRAMUSCULAR; INTRAVENOUS AS NEEDED
Status: DISCONTINUED | OUTPATIENT
Start: 2021-08-02 | End: 2021-08-02 | Stop reason: SURG

## 2021-08-02 RX ORDER — TAMSULOSIN HYDROCHLORIDE 0.4 MG/1
1 CAPSULE ORAL NIGHTLY
Qty: 14 CAPSULE | Refills: 0 | Status: SHIPPED | OUTPATIENT
Start: 2021-08-02 | End: 2023-02-09

## 2021-08-02 RX ORDER — NALOXONE HCL 0.4 MG/ML
0.4 VIAL (ML) INJECTION AS NEEDED
Status: DISCONTINUED | OUTPATIENT
Start: 2021-08-02 | End: 2021-08-02 | Stop reason: HOSPADM

## 2021-08-02 RX ORDER — SODIUM CHLORIDE, SODIUM LACTATE, POTASSIUM CHLORIDE, CALCIUM CHLORIDE 600; 310; 30; 20 MG/100ML; MG/100ML; MG/100ML; MG/100ML
100 INJECTION, SOLUTION INTRAVENOUS CONTINUOUS
Status: DISCONTINUED | OUTPATIENT
Start: 2021-08-02 | End: 2021-08-02 | Stop reason: HOSPADM

## 2021-08-02 RX ORDER — FENTANYL CITRATE 50 UG/ML
INJECTION, SOLUTION INTRAMUSCULAR; INTRAVENOUS AS NEEDED
Status: DISCONTINUED | OUTPATIENT
Start: 2021-08-02 | End: 2021-08-02 | Stop reason: SURG

## 2021-08-02 RX ORDER — IBUPROFEN 600 MG/1
600 TABLET ORAL ONCE AS NEEDED
Status: DISCONTINUED | OUTPATIENT
Start: 2021-08-02 | End: 2021-08-02 | Stop reason: HOSPADM

## 2021-08-02 RX ORDER — ONDANSETRON 2 MG/ML
4 INJECTION INTRAMUSCULAR; INTRAVENOUS ONCE AS NEEDED
Status: DISCONTINUED | OUTPATIENT
Start: 2021-08-02 | End: 2021-08-02 | Stop reason: HOSPADM

## 2021-08-02 RX ORDER — HYDROCODONE BITARTRATE AND ACETAMINOPHEN 7.5; 325 MG/1; MG/1
1 TABLET ORAL EVERY 6 HOURS PRN
Qty: 12 TABLET | Refills: 0 | Status: ON HOLD | OUTPATIENT
Start: 2021-08-02 | End: 2021-11-15

## 2021-08-02 RX ORDER — LIDOCAINE HYDROCHLORIDE 20 MG/ML
INJECTION, SOLUTION EPIDURAL; INFILTRATION; INTRACAUDAL; PERINEURAL AS NEEDED
Status: DISCONTINUED | OUTPATIENT
Start: 2021-08-02 | End: 2021-08-02 | Stop reason: SURG

## 2021-08-02 RX ORDER — SODIUM CHLORIDE 0.9 % (FLUSH) 0.9 %
3 SYRINGE (ML) INJECTION EVERY 12 HOURS SCHEDULED
Status: DISCONTINUED | OUTPATIENT
Start: 2021-08-02 | End: 2021-08-02 | Stop reason: HOSPADM

## 2021-08-02 RX ORDER — SODIUM CHLORIDE 0.9 % (FLUSH) 0.9 %
3 SYRINGE (ML) INJECTION AS NEEDED
Status: DISCONTINUED | OUTPATIENT
Start: 2021-08-02 | End: 2021-08-02 | Stop reason: HOSPADM

## 2021-08-02 RX ORDER — MIDAZOLAM HYDROCHLORIDE 1 MG/ML
1 INJECTION INTRAMUSCULAR; INTRAVENOUS ONCE
Status: COMPLETED | OUTPATIENT
Start: 2021-08-02 | End: 2021-08-02

## 2021-08-02 RX ORDER — FENTANYL CITRATE 50 UG/ML
25 INJECTION, SOLUTION INTRAMUSCULAR; INTRAVENOUS
Status: DISCONTINUED | OUTPATIENT
Start: 2021-08-02 | End: 2021-08-02 | Stop reason: HOSPADM

## 2021-08-02 RX ORDER — LIDOCAINE HYDROCHLORIDE 10 MG/ML
0.5 INJECTION, SOLUTION EPIDURAL; INFILTRATION; INTRACAUDAL; PERINEURAL ONCE AS NEEDED
Status: DISCONTINUED | OUTPATIENT
Start: 2021-08-02 | End: 2021-08-02 | Stop reason: HOSPADM

## 2021-08-02 RX ORDER — MIDAZOLAM HYDROCHLORIDE 1 MG/ML
1 INJECTION INTRAMUSCULAR; INTRAVENOUS
Status: DISCONTINUED | OUTPATIENT
Start: 2021-08-02 | End: 2021-08-02 | Stop reason: HOSPADM

## 2021-08-02 RX ORDER — EPHEDRINE SULFATE 50 MG/ML
INJECTION, SOLUTION INTRAVENOUS AS NEEDED
Status: DISCONTINUED | OUTPATIENT
Start: 2021-08-02 | End: 2021-08-02 | Stop reason: SURG

## 2021-08-02 RX ORDER — SODIUM CHLORIDE, SODIUM LACTATE, POTASSIUM CHLORIDE, CALCIUM CHLORIDE 600; 310; 30; 20 MG/100ML; MG/100ML; MG/100ML; MG/100ML
1000 INJECTION, SOLUTION INTRAVENOUS CONTINUOUS
Status: DISCONTINUED | OUTPATIENT
Start: 2021-08-02 | End: 2021-08-02 | Stop reason: HOSPADM

## 2021-08-02 RX ORDER — ONDANSETRON 2 MG/ML
INJECTION INTRAMUSCULAR; INTRAVENOUS AS NEEDED
Status: DISCONTINUED | OUTPATIENT
Start: 2021-08-02 | End: 2021-08-02 | Stop reason: SURG

## 2021-08-02 RX ORDER — PROPOFOL 10 MG/ML
VIAL (ML) INTRAVENOUS AS NEEDED
Status: DISCONTINUED | OUTPATIENT
Start: 2021-08-02 | End: 2021-08-02 | Stop reason: SURG

## 2021-08-02 RX ORDER — OXYCODONE AND ACETAMINOPHEN 10; 325 MG/1; MG/1
1 TABLET ORAL ONCE AS NEEDED
Status: DISCONTINUED | OUTPATIENT
Start: 2021-08-02 | End: 2021-08-02 | Stop reason: HOSPADM

## 2021-08-02 RX ORDER — DEXAMETHASONE SODIUM PHOSPHATE 4 MG/ML
INJECTION, SOLUTION INTRA-ARTICULAR; INTRALESIONAL; INTRAMUSCULAR; INTRAVENOUS; SOFT TISSUE AS NEEDED
Status: DISCONTINUED | OUTPATIENT
Start: 2021-08-02 | End: 2021-08-02 | Stop reason: SURG

## 2021-08-02 RX ORDER — ACETAMINOPHEN 500 MG
1000 TABLET ORAL ONCE
Status: COMPLETED | OUTPATIENT
Start: 2021-08-02 | End: 2021-08-02

## 2021-08-02 RX ORDER — SODIUM CHLORIDE 0.9 % (FLUSH) 0.9 %
3-10 SYRINGE (ML) INJECTION AS NEEDED
Status: DISCONTINUED | OUTPATIENT
Start: 2021-08-02 | End: 2021-08-02 | Stop reason: HOSPADM

## 2021-08-02 RX ORDER — LABETALOL HYDROCHLORIDE 5 MG/ML
5 INJECTION, SOLUTION INTRAVENOUS
Status: DISCONTINUED | OUTPATIENT
Start: 2021-08-02 | End: 2021-08-02 | Stop reason: HOSPADM

## 2021-08-02 RX ORDER — FLUMAZENIL 0.1 MG/ML
0.2 INJECTION INTRAVENOUS AS NEEDED
Status: DISCONTINUED | OUTPATIENT
Start: 2021-08-02 | End: 2021-08-02 | Stop reason: HOSPADM

## 2021-08-02 RX ORDER — HYDROCODONE BITARTRATE AND ACETAMINOPHEN 7.5; 325 MG/1; MG/1
1 TABLET ORAL ONCE AS NEEDED
Status: DISCONTINUED | OUTPATIENT
Start: 2021-08-02 | End: 2021-08-02 | Stop reason: HOSPADM

## 2021-08-02 RX ORDER — PHENYLEPHRINE HCL IN 0.9% NACL 0.8MG/10ML
SYRINGE (ML) INTRAVENOUS AS NEEDED
Status: DISCONTINUED | OUTPATIENT
Start: 2021-08-02 | End: 2021-08-02 | Stop reason: SURG

## 2021-08-02 RX ORDER — DEXAMETHASONE SODIUM PHOSPHATE 4 MG/ML
4 INJECTION, SOLUTION INTRA-ARTICULAR; INTRALESIONAL; INTRAMUSCULAR; INTRAVENOUS; SOFT TISSUE ONCE AS NEEDED
Status: COMPLETED | OUTPATIENT
Start: 2021-08-02 | End: 2021-08-02

## 2021-08-02 RX ORDER — BUPIVACAINE HCL/0.9 % NACL/PF 0.1 %
2 PLASTIC BAG, INJECTION (ML) EPIDURAL ONCE
Status: COMPLETED | OUTPATIENT
Start: 2021-08-02 | End: 2021-08-02

## 2021-08-02 RX ORDER — OXYCODONE AND ACETAMINOPHEN 7.5; 325 MG/1; MG/1
2 TABLET ORAL EVERY 4 HOURS PRN
Status: DISCONTINUED | OUTPATIENT
Start: 2021-08-02 | End: 2021-08-02 | Stop reason: HOSPADM

## 2021-08-02 RX ADMIN — Medication 2 G: at 13:50

## 2021-08-02 RX ADMIN — MIDAZOLAM 1 MG: 1 INJECTION INTRAMUSCULAR; INTRAVENOUS at 15:44

## 2021-08-02 RX ADMIN — FENTANYL CITRATE 100 MCG: 50 INJECTION, SOLUTION INTRAMUSCULAR; INTRAVENOUS at 13:58

## 2021-08-02 RX ADMIN — LIDOCAINE HYDROCHLORIDE 100 MG: 20 INJECTION, SOLUTION EPIDURAL; INFILTRATION; INTRACAUDAL; PERINEURAL at 13:53

## 2021-08-02 RX ADMIN — PROPOFOL 200 MG: 10 INJECTION, EMULSION INTRAVENOUS at 13:53

## 2021-08-02 RX ADMIN — EPHEDRINE SULFATE 25 MG: 50 INJECTION INTRAVENOUS at 14:02

## 2021-08-02 RX ADMIN — EPHEDRINE SULFATE 25 MG: 50 INJECTION INTRAVENOUS at 14:04

## 2021-08-02 RX ADMIN — ACETAMINOPHEN 1000 MG: 500 TABLET, FILM COATED ORAL at 13:26

## 2021-08-02 RX ADMIN — DEXAMETHASONE SODIUM PHOSPHATE 4 MG: 4 INJECTION, SOLUTION INTRA-ARTICULAR; INTRALESIONAL; INTRAMUSCULAR; INTRAVENOUS; SOFT TISSUE at 13:58

## 2021-08-02 RX ADMIN — Medication 400 MCG: at 14:13

## 2021-08-02 RX ADMIN — ONDANSETRON 4 MG: 2 INJECTION INTRAMUSCULAR; INTRAVENOUS at 13:58

## 2021-08-02 RX ADMIN — DEXAMETHASONE SODIUM PHOSPHATE 4 MG: 4 INJECTION, SOLUTION INTRA-ARTICULAR; INTRALESIONAL; INTRAMUSCULAR; INTRAVENOUS; SOFT TISSUE at 13:26

## 2021-08-02 RX ADMIN — EPHEDRINE SULFATE 10 MG: 50 INJECTION INTRAVENOUS at 14:10

## 2021-08-02 RX ADMIN — SODIUM CHLORIDE, POTASSIUM CHLORIDE, SODIUM LACTATE AND CALCIUM CHLORIDE 1000 ML: 600; 310; 30; 20 INJECTION, SOLUTION INTRAVENOUS at 10:25

## 2021-08-02 RX ADMIN — KETOROLAC TROMETHAMINE 30 MG: 30 INJECTION, SOLUTION INTRAMUSCULAR; INTRAVENOUS at 13:58

## 2021-08-02 NOTE — ANESTHESIA POSTPROCEDURE EVALUATION
Patient: Frederick Simms    Procedure Summary     Date: 08/02/21 Room / Location:  [facility] OR  /  [facility]    Anesthesia Start: 1351 Anesthesia Stop: 1453    Procedure: EXTRACORPOREAL SHOCKWAVE LITHOTRIPSY RIGHT (Right ) Diagnosis:       Nephrolithiasis      (Nephrolithiasis [N20.0])    Surgeons: Mega Muñoz MD Provider: RAMONA Diane CRNA    Anesthesia Type: general ASA Status: 2          Anesthesia Type: general    Vitals  Vitals Value Taken Time   /75 08/02/21 1510   Temp 98.9 °F (37.2 °C) 08/02/21 1510   Pulse 98 08/02/21 1511   Resp 14 08/02/21 1510   SpO2 98 % 08/02/21 1511   Vitals shown include unvalidated device data.        Post Anesthesia Care and Evaluation    Patient location during evaluation: PACU  Patient participation: complete - patient participated  Level of consciousness: awake and alert  Pain management: adequate  Airway patency: patent  Anesthetic complications: No anesthetic complications  PONV Status: none  Cardiovascular status: acceptable and hemodynamically stable  Respiratory status: acceptable  Hydration status: acceptable    Comments: Blood pressure 144/93, pulse 91, temperature 98.9 °F (37.2 °C), temperature source Temporal, resp. rate 16, weight 77.3 kg (170 lb 6.7 oz), SpO2 100 %.    Patient discharged from PACU based upon Bharath score. Please see RN notes for further details

## 2021-08-02 NOTE — DISCHARGE INSTRUCTIONS

## 2021-08-02 NOTE — ANESTHESIA PROCEDURE NOTES
Airway  Urgency: elective    Date/Time: 8/2/2021 1:55 PM  Airway not difficult    General Information and Staff    Patient location during procedure: OR  CRNA: RAMONA Diane CRNA    Indications and Patient Condition  Indications for airway management: airway protection    Preoxygenated: yes  MILS maintained throughout  Mask difficulty assessment: 1 - vent by mask    Final Airway Details  Final airway type: supraglottic airway      Successful airway: Size 4    Number of attempts at approach: 1  Assessment: lips, teeth, and gum same as pre-op and atraumatic intubation

## 2021-08-02 NOTE — OP NOTE
EXTRACORPOREAL SHOCKWAVE LITHOTRIPSY  Procedure Note    Frederick Simms  8/2/2021    Pre-op Diagnosis:   Nephrolithiasis [N20.0]    Post-op Diagnosis:     Post-Op Diagnosis Codes:     * Nephrolithiasis [N20.0]    Procedure/CPT® Codes:      Procedure(s):  EXTRACORPOREAL SHOCKWAVE LITHOTRIPSY RIGHT    Surgeon(s):  Mega Muñoz MD    Anesthesia: General    Staff:   Circulator: Maru Elziondo RN; Farhana Felix RN  Scrub Person: Lorin Montague    Estimated Blood Loss: minimal    Specimens:                None      Drains: * No LDAs found *    Findings: 4mm upper pole stone with good breakup    Complications: none    Indications:. Patient has  A stone  measuring approximately 4  mm in the right Upper pole. After discussion of options, patient has given informed consent to undergo right ESWL.  All risks, benefits, and alternatives were discussed.    Operative Report: After informed consent was obtained, patient was brought to the operating room.  General endotracheal anesthesia was administered in the supine posistion.  Preoperative KUB was reviewed.  Please see above for preoperative KUB findings.    The shock head is brought into position and the stone is brought into the F2 plane for focus.  The stone was identified and the procedure began.  We gradually increased the energy level from 1 to 7.  This stone was treated at a rate of 90.  We paused for 2 minutes after 300 shocks to reduce the risk of renal damage.  The stone was periodically checked to make sure that we were on it and getting good breakup.  We checked at 700, 1000, 1500, 2000, and 2500 shocks.  The stone did have good breakup.  I felt it was unnecessary to place a ureteral stent.  At the conclusion of 3000 shocks, the patient was awakened from anesthesia and transferred to the recovery room in satisfactory condition.            Mega Muñoz MD     Date: 8/2/2021  Time: 14:41 CDT

## 2021-08-02 NOTE — H&P
Subjective     Mr. Simms is 56 y.o. male     Chief Complaint: CT / KUB Non Obstructing Stone     History of Present Illness  Patient with prior history of kidney stones is here for follow-up with intermittent right mid back right abdominal pain.  Pain comes and goes and is described as stabbing type pain.  Denies any fever chills nausea vomiting or gross hematuria.  No decrease or changes in urine flow or stream.  Patient went to emergency department 05/25/2021 for abdominal pain CT scan was done which revealed a nonobstructing stone in the right kidney.     The following portions of the patient's history were reviewed and updated as appropriate: allergies, current medications, past family history, past medical history, past social history, past surgical history and problem list.     Review of Systems   Constitutional: Negative for appetite change and fever.   HENT: Negative for hearing loss and sore throat.    Eyes: Negative for pain and redness.   Respiratory: Negative for cough and shortness of breath.    Cardiovascular: Negative for chest pain and leg swelling.   Gastrointestinal: Positive for abdominal pain (Right lower abd pain). Negative for anal bleeding, nausea and vomiting.   Endocrine: Negative for cold intolerance and heat intolerance.   Genitourinary: Negative for dysuria, flank pain, frequency, hematuria and urgency.   Musculoskeletal: Negative for joint swelling and myalgias.   Skin: Negative for color change and rash.   Allergic/Immunologic: Negative for immunocompromised state.   Neurological: Negative for dizziness and speech difficulty.   Hematological: Negative for adenopathy. Does not bruise/bleed easily.   Psychiatric/Behavioral: Negative for dysphoric mood and suicidal ideas.            Current Outpatient Medications:   •  amitriptyline (ELAVIL) 50 MG tablet, Take 50 mg by mouth Every Night., Disp: , Rfl:   •  Euthyrox 75 MCG tablet, TAKE 1 TABLET BY MOUTH ONCE DAILY IN THE MORNING ON AN EMPTY  STOMACH, Disp: , Rfl:   •  fenofibrate (TRICOR) 145 MG tablet, Take 145 mg by mouth Every Night., Disp: , Rfl:   •  levothyroxine sodium (TIROSINT) 50 MCG capsule, Take 50 mcg by mouth Every Night., Disp: , Rfl:   •  metoprolol tartrate (LOPRESSOR) 100 MG tablet, Take 100 mg by mouth 2 (Two) Times a Day., Disp: , Rfl:   •  OLANZapine (zyPREXA) 10 MG tablet, Take 10 mg by mouth Every Night., Disp: , Rfl:   •  ondansetron ODT (ZOFRAN-ODT) 4 MG disintegrating tablet, Place 1 tablet on the tongue Every 6 (Six) Hours As Needed for Nausea., Disp: 12 tablet, Rfl: 0  •  pantoprazole (PROTONIX) 40 MG EC tablet, Take 40 mg by mouth Every Night., Disp: , Rfl:      Medical History        Past Medical History:   Diagnosis Date   • Disease of thyroid gland     • GERD (gastroesophageal reflux disease)     • Hyperlipidemia     • Hypertension     • Irregular cardiac rhythm              Surgical History         Past Surgical History:   Procedure Laterality Date   • COLONOSCOPY N/A 6/9/2021     Procedure: COLONOSCOPY WITH ANESTHESIA;  Surgeon: Willian Bajwa DO;  Location: [facility] ENDOSCOPY;  Service: Gastroenterology;  Laterality: N/A;  preop; abdominal pain  postop; rectal ulcer   PCP Edgar Gomez    • ENDOSCOPY N/A 12/16/2019     Procedure: ESOPHAGOGASTRODUODENOSCOPY WITH ANESTHESIA;  Surgeon: Willian Bajwa DO;  Location: [facility] ENDOSCOPY;  Service: Gastroenterology   • GALLBLADDER SURGERY       • TONSILLECTOMY                Social History   Social History            Socioeconomic History   • Marital status: Single       Spouse name: Not on file   • Number of children: Not on file   • Years of education: Not on file   • Highest education level: Not on file   Tobacco Use   • Smoking status: Former Smoker       Types: Cigarettes   • Smokeless tobacco: Never Used   Vaping Use   • Vaping Use: Never used   Substance and Sexual Activity   • Alcohol use: No   • Drug use: No   • Sexual activity: Defer                  Family  "History   Problem Relation Age of Onset   • Hypertension Mother     • Heart disease Mother     • Stroke Father     • No Known Problems Sister     • No Known Problems Brother     • No Known Problems Daughter     • No Known Problems Son     • Heart disease Maternal Grandmother     • No Known Problems Paternal Grandmother     • No Known Problems Maternal Aunt     • No Known Problems Paternal Aunt     • BRCA 1/2 Neg Hx     • Breast cancer Neg Hx     • Colon cancer Neg Hx     • Endometrial cancer Neg Hx     • Ovarian cancer Neg Hx     • Colon polyps Neg Hx           Objective     Temp 97.6 °F (36.4 °C) (Temporal)   Ht 175.3 cm (69\")   Wt 75.8 kg (167 lb)   BMI 24.66 kg/m²      Physical Exam  Vitals reviewed.   Constitutional:       Appearance: Normal appearance.   HENT:      Head: Normocephalic and atraumatic.      Right Ear: External ear normal.      Left Ear: External ear normal.      Nose: No congestion.   Eyes:      Conjunctiva/sclera: Conjunctivae normal.   Pulmonary:      Effort: Pulmonary effort is normal.   Abdominal:      General: There is no distension.      Palpations: Abdomen is soft.      Tenderness: There is no abdominal tenderness. There is no right CVA tenderness or left CVA tenderness.   Neurological:      Mental Status: He is alert. Mental status is at baseline.   Psychiatric:         Mood and Affect: Mood normal.         Behavior: Behavior normal.                        Results for orders placed or performed in visit on 06/18/21   POC Urinalysis Dipstick, Multipro     Specimen: Urine   Result Value Ref Range     Color Yellow Yellow, Straw, Dark Yellow, Anai     Clarity, UA Clear Clear     Glucose, UA Negative Negative, 1000 mg/dL (3+) mg/dL     Bilirubin Negative Negative     Ketones, UA Negative Negative     Specific Gravity  1.020 1.005 - 1.030     Blood, UA Negative Negative     pH, Urine 7.0 5.0 - 8.0     Protein, POC Negative Negative mg/dL     Urobilinogen, UA Normal Normal     Nitrite, UA " Negative Negative     Leukocytes Small (1+) (A) Negative         KUB with 4 mm stone right upper pole with no stone seen in the left kidney   Assessment and Plan     Diagnoses and all orders for this visit:     1. Nephrolithiasis (Primary)  -     POC Urinalysis Dipstick, Multipro  -     Case Request; Standing  -     Protime-INR; Future  -     ceFAZolin (ANCEF) 2 g in sodium chloride 0.9 % 100 mL IVPB  -     Case Request     2. Other specified disorders of kidney and ureter   -     Protime-INR; Future     Other orders  -     Follow Anesthesia Guidelines / Standing Orders; Future  -     Provide NPO Instructions to Patient; Future  -     Follow Anesthesia Guidelines / Protocol; Standing  -     Verify NPO Status; Standing  -     Obtain Informed Consent; Standing  -     Verify / Perform Chlorhexidine Skin Prep; Standing  -     XR Abdomen KUB; Standing  Right ESWL

## 2021-08-02 NOTE — ANESTHESIA PREPROCEDURE EVALUATION
Anesthesia Evaluation     Patient summary reviewed   no history of anesthetic complications:  NPO Solid Status: > 6 hours  NPO Liquid Status: > 6 hours           Airway   Mallampati: I  TM distance: >3 FB  Neck ROM: full  No difficulty expected  Dental - normal exam     Pulmonary    (-) asthma, sleep apnea, not a smoker  Cardiovascular   Exercise tolerance: good (4-7 METS)    ECG reviewed    (+) hypertension (listed on EMR, patient denies. States on metoprolol for tachycardia), dysrhythmias Tachycardia, hyperlipidemia,       Neuro/Psych  (+) seizures (20+ years ago),     (-) TIA, CVA  GI/Hepatic/Renal/Endo    (+)  GERD,  renal disease stones, thyroid problem     Musculoskeletal     Abdominal    Substance History      OB/GYN          Other                        Anesthesia Plan    ASA 2     general   (Ok to go after 2 pm 2/2 NPO status.)  intravenous induction     Anesthetic plan, all risks, benefits, and alternatives have been provided, discussed and informed consent has been obtained with: patient.

## 2021-08-02 NOTE — NURSING NOTE
Agitated, demanding to get up, oriented x 4,  Reoriented by staff. Patient requesting to go to outpatient to see mother, to opc stable

## 2021-08-10 NOTE — PROGRESS NOTES
Subjective    Mr. Simms is 56 y.o. male    Chief Complaint: POST OP FU, kidney stone; KUB prior.     History of Present Illness  Patient had a right upper pole stone who was having right-sided pain had right ESWL.  Patient states he did pass some small granules.  Postoperatively he is done well he still has the discomfort he was having previously.  I had explained to him prior to his ESWL that this may not help the pain he is having.  KUB revealed no obvious calcification the previous rounded right upper pole calcification is no longer clearly visible.  His urine is clear.    The following portions of the patient's history were reviewed and updated as appropriate: allergies, current medications, past family history, past medical history, past social history, past surgical history and problem list.    Review of Systems   Constitutional: Negative for chills and fever.   Gastrointestinal: Negative for abdominal pain, anal bleeding and blood in stool.   Genitourinary: Negative for dysuria, flank pain, frequency, hematuria and urgency.         Current Outpatient Medications:   •  amitriptyline (ELAVIL) 50 MG tablet, Take 50 mg by mouth Every Night., Disp: , Rfl:   •  aspirin 81 MG EC tablet, Take 81 mg by mouth Daily., Disp: , Rfl:   •  Euthyrox 75 MCG tablet, TAKE 1 TABLET BY MOUTH ONCE DAILY IN THE MORNING ON AN EMPTY STOMACH, Disp: , Rfl:   •  fenofibrate (TRICOR) 145 MG tablet, Take 145 mg by mouth Every Night., Disp: , Rfl:   •  levothyroxine sodium (TIROSINT) 50 MCG capsule, Take 50 mcg by mouth Every Night., Disp: , Rfl:   •  metoprolol tartrate (LOPRESSOR) 100 MG tablet, Take 100 mg by mouth Daily., Disp: , Rfl:   •  OLANZapine (zyPREXA) 10 MG tablet, Take 10 mg by mouth Every Night., Disp: , Rfl:   •  pantoprazole (PROTONIX) 40 MG EC tablet, Take 40 mg by mouth Every Night., Disp: , Rfl:   •  HYDROcodone-acetaminophen (NORCO) 7.5-325 MG per tablet, Take 1 tablet by mouth Every 6 (Six) Hours As Needed for  Moderate Pain ., Disp: 12 tablet, Rfl: 0  •  ondansetron ODT (ZOFRAN-ODT) 4 MG disintegrating tablet, Place 1 tablet on the tongue Every 6 (Six) Hours As Needed for Nausea., Disp: 12 tablet, Rfl: 0  •  tamsulosin (FLOMAX) 0.4 MG capsule 24 hr capsule, Take 1 capsule by mouth Every Night., Disp: 14 capsule, Rfl: 0    Past Medical History:   Diagnosis Date   • Disease of thyroid gland    • Elevated cholesterol    • GERD (gastroesophageal reflux disease)    • Hyperlipidemia    • Hypertension    • Irregular cardiac rhythm    • Lipoma    • Nephrolithiasis 6/18/2021       Past Surgical History:   Procedure Laterality Date   • COLONOSCOPY N/A 6/9/2021    Procedure: COLONOSCOPY WITH ANESTHESIA;  Surgeon: Willian Bajwa DO;  Location: [facility] ENDOSCOPY;  Service: Gastroenterology;  Laterality: N/A;  preop; abdominal pain  postop; rectal ulcer   PCP Edgar Gomez    • ENDOSCOPY N/A 12/16/2019    Procedure: ESOPHAGOGASTRODUODENOSCOPY WITH ANESTHESIA;  Surgeon: Willian Bajwa DO;  Location: [facility] ENDOSCOPY;  Service: Gastroenterology   • EXTRACORPOREAL SHOCK WAVE LITHOTRIPSY (ESWL) Right 8/2/2021    Procedure: EXTRACORPOREAL SHOCKWAVE LITHOTRIPSY RIGHT;  Surgeon: Mega Muñoz MD;  Location: [facility] OR;  Service: Urology;  Laterality: Right;   • GALLBLADDER SURGERY     • LIPOMA EXCISION     • TONSILLECTOMY         Social History     Socioeconomic History   • Marital status: Single     Spouse name: Not on file   • Number of children: Not on file   • Years of education: Not on file   • Highest education level: Not on file   Tobacco Use   • Smoking status: Former Smoker     Packs/day: 0.50     Types: Cigarettes   • Smokeless tobacco: Never Used   • Tobacco comment: 34 YRS AGO   Vaping Use   • Vaping Use: Never used   Substance and Sexual Activity   • Alcohol use: No   • Drug use: No   • Sexual activity: Defer       Family History   Problem Relation Age of Onset   • Hypertension Mother    • Heart disease Mother    •  "Stroke Father    • No Known Problems Sister    • No Known Problems Brother    • No Known Problems Daughter    • No Known Problems Son    • Heart disease Maternal Grandmother    • No Known Problems Paternal Grandmother    • No Known Problems Maternal Aunt    • No Known Problems Paternal Aunt    • BRCA 1/2 Neg Hx    • Breast cancer Neg Hx    • Colon cancer Neg Hx    • Endometrial cancer Neg Hx    • Ovarian cancer Neg Hx    • Colon polyps Neg Hx        Objective    Temp 98.2 °F (36.8 °C) (Temporal)   Ht 175.3 cm (69\")   Wt 77.2 kg (170 lb 3.2 oz)   BMI 25.13 kg/m²     Physical Exam  Vitals reviewed.   Constitutional:       Appearance: Normal appearance.   HENT:      Head: Normocephalic and atraumatic.      Right Ear: External ear normal.      Left Ear: External ear normal.   Neck:      Comments: I observed no obvious neck masses.  Pulmonary:      Effort: Pulmonary effort is normal.   Neurological:      General: No focal deficit present.      Mental Status: He is alert and oriented to person, place, and time.   Psychiatric:         Mood and Affect: Mood normal.         Behavior: Behavior normal.             Results for orders placed or performed in visit on 08/19/21   POC Urinalysis Dipstick, Multipro    Specimen: Urine   Result Value Ref Range    Color Yellow Yellow, Straw, Dark Yellow, Anai    Clarity, UA Clear Clear    Glucose, UA Negative Negative, 1000 mg/dL (3+) mg/dL    Bilirubin Negative Negative    Ketones, UA Negative Negative    Specific Gravity  1.010 1.005 - 1.030    Blood, UA Trace (A) Negative    pH, Urine 7.0 5.0 - 8.0    Protein, POC Negative Negative mg/dL    Urobilinogen, UA Normal Normal    Nitrite, UA Negative Negative    Leukocytes Negative Negative     KUB independent review    A KUB is available for me to review today.  The image is inspected for a bowel gas pattern and the general bone structure of the spine and pelvis. The kidneys are then inspected closely.  Renal outline is noted if " identifiable. The kidney, collecting system, and anticipated path of the ureter are examined for calcifications including those in the true pelvis.  This film reveals:    On the right there are no calcificaitons seen in the kidney or the expected course of the ureter.     On the left there are no calcificaitons seen in the kidney or the expected course of the ureter.    Assessment and Plan    Diagnoses and all orders for this visit:    1. Nephrolithiasis (Primary)  -     POC Urinalysis Dipstick, Multipro  -     XR Abdomen KUB; Future    Patient passing granules I do not see any obvious form stone in the right upper pole which was previously seen on KUB prior to ESWL.  I gave him the stone dietary sheet for calcium oxalate stone prevention.  Patient will return in 6 months with KUB.

## 2021-08-19 ENCOUNTER — OFFICE VISIT (OUTPATIENT)
Dept: UROLOGY | Facility: CLINIC | Age: 56
End: 2021-08-19

## 2021-08-19 ENCOUNTER — HOSPITAL ENCOUNTER (OUTPATIENT)
Dept: GENERAL RADIOLOGY | Facility: HOSPITAL | Age: 56
Discharge: HOME | End: 2021-08-19
Admitting: UROLOGY

## 2021-08-19 VITALS — TEMPERATURE: 98.2 F | BODY MASS INDEX: 25.21 KG/M2 | HEIGHT: 69 IN | WEIGHT: 170.2 LBS

## 2021-08-19 DIAGNOSIS — N20.0 NEPHROLITHIASIS: Primary | ICD-10-CM

## 2021-08-19 DIAGNOSIS — N20.0 NEPHROLITHIASIS: ICD-10-CM

## 2021-08-19 LAB
BILIRUB BLD-MCNC: NEGATIVE MG/DL
CLARITY, POC: CLEAR
COLOR UR: YELLOW
GLUCOSE UR STRIP-MCNC: NEGATIVE MG/DL
KETONES UR QL: NEGATIVE
LEUKOCYTE EST, POC: NEGATIVE
NITRITE UR-MCNC: NEGATIVE MG/ML
PH UR: 7 [PH] (ref 5–8)
PROT UR STRIP-MCNC: NEGATIVE MG/DL
RBC # UR STRIP: ABNORMAL /UL
SP GR UR: 1.01 (ref 1–1.03)
UROBILINOGEN UR QL: NORMAL

## 2021-08-19 PROCEDURE — 81001 URINALYSIS AUTO W/SCOPE: CPT | Performed by: PHYSICIAN ASSISTANT

## 2021-08-19 PROCEDURE — 99024 POSTOP FOLLOW-UP VISIT: CPT | Performed by: PHYSICIAN ASSISTANT

## 2021-08-19 PROCEDURE — 74018 RADEX ABDOMEN 1 VIEW: CPT

## 2021-11-14 ENCOUNTER — HOSPITAL ENCOUNTER (INPATIENT)
Facility: HOSPITAL | Age: 56
LOS: 3 days | Discharge: HOME | End: 2021-11-17
Attending: INTERNAL MEDICINE | Admitting: FAMILY MEDICINE

## 2021-11-14 ENCOUNTER — APPOINTMENT (OUTPATIENT)
Dept: CT IMAGING | Facility: HOSPITAL | Age: 56
End: 2021-11-14

## 2021-11-14 ENCOUNTER — APPOINTMENT (OUTPATIENT)
Dept: GENERAL RADIOLOGY | Facility: HOSPITAL | Age: 56
End: 2021-11-14

## 2021-11-14 DIAGNOSIS — N12 PYELONEPHRITIS: Primary | ICD-10-CM

## 2021-11-14 PROBLEM — I10 ESSENTIAL HYPERTENSION: Status: ACTIVE | Noted: 2021-11-14

## 2021-11-14 PROBLEM — E03.9 HYPOTHYROID: Status: ACTIVE | Noted: 2021-11-14

## 2021-11-14 PROBLEM — K59.00 CONSTIPATION: Status: ACTIVE | Noted: 2021-11-14

## 2021-11-14 PROBLEM — E78.5 HYPERLIPIDEMIA: Status: ACTIVE | Noted: 2021-11-14

## 2021-11-14 PROBLEM — R74.01 TRANSAMINITIS: Status: ACTIVE | Noted: 2021-11-14

## 2021-11-14 LAB
ALBUMIN SERPL-MCNC: 4 G/DL (ref 3.5–5.2)
ALBUMIN/GLOB SERPL: 1.2 G/DL
ALP SERPL-CCNC: 72 U/L (ref 39–117)
ALT SERPL W P-5'-P-CCNC: 54 U/L (ref 1–41)
ANION GAP SERPL CALCULATED.3IONS-SCNC: 11 MMOL/L (ref 5–15)
AST SERPL-CCNC: 46 U/L (ref 1–40)
B PARAPERT DNA SPEC QL NAA+PROBE: NOT DETECTED
B PERT DNA SPEC QL NAA+PROBE: NOT DETECTED
BACTERIA UR QL AUTO: ABNORMAL /HPF
BASOPHILS # BLD AUTO: 0.03 10*3/MM3 (ref 0–0.2)
BASOPHILS NFR BLD AUTO: 0.2 % (ref 0–1.5)
BILIRUB SERPL-MCNC: 0.4 MG/DL (ref 0–1.2)
BILIRUB UR QL STRIP: NEGATIVE
BUN SERPL-MCNC: 15 MG/DL (ref 6–20)
BUN/CREAT SERPL: 16.7 (ref 7–25)
C PNEUM DNA NPH QL NAA+NON-PROBE: NOT DETECTED
CALCIUM SPEC-SCNC: 9.5 MG/DL (ref 8.6–10.5)
CHLORIDE SERPL-SCNC: 98 MMOL/L (ref 98–107)
CLARITY UR: ABNORMAL
CO2 SERPL-SCNC: 27 MMOL/L (ref 22–29)
COLOR UR: YELLOW
CREAT SERPL-MCNC: 0.9 MG/DL (ref 0.76–1.27)
D-LACTATE SERPL-SCNC: 1.7 MMOL/L (ref 0.5–2)
DEPRECATED RDW RBC AUTO: 38.5 FL (ref 37–54)
EOSINOPHIL # BLD AUTO: 0 10*3/MM3 (ref 0–0.4)
EOSINOPHIL NFR BLD AUTO: 0 % (ref 0.3–6.2)
ERYTHROCYTE [DISTWIDTH] IN BLOOD BY AUTOMATED COUNT: 11.9 % (ref 12.3–15.4)
FLUAV SUBTYP SPEC NAA+PROBE: NOT DETECTED
FLUBV RNA ISLT QL NAA+PROBE: NOT DETECTED
GFR SERPL CREATININE-BSD FRML MDRD: 88 ML/MIN/1.73
GLOBULIN UR ELPH-MCNC: 3.3 GM/DL
GLUCOSE SERPL-MCNC: 123 MG/DL (ref 65–99)
GLUCOSE UR STRIP-MCNC: NEGATIVE MG/DL
HADV DNA SPEC NAA+PROBE: NOT DETECTED
HCOV 229E RNA SPEC QL NAA+PROBE: NOT DETECTED
HCOV HKU1 RNA SPEC QL NAA+PROBE: NOT DETECTED
HCOV NL63 RNA SPEC QL NAA+PROBE: NOT DETECTED
HCOV OC43 RNA SPEC QL NAA+PROBE: NOT DETECTED
HCT VFR BLD AUTO: 42.5 % (ref 37.5–51)
HGB BLD-MCNC: 14.4 G/DL (ref 13–17.7)
HGB UR QL STRIP.AUTO: ABNORMAL
HMPV RNA NPH QL NAA+NON-PROBE: NOT DETECTED
HPIV1 RNA SPEC QL NAA+PROBE: NOT DETECTED
HPIV2 RNA SPEC QL NAA+PROBE: NOT DETECTED
HPIV3 RNA NPH QL NAA+PROBE: NOT DETECTED
HPIV4 P GENE NPH QL NAA+PROBE: NOT DETECTED
HYALINE CASTS UR QL AUTO: ABNORMAL /LPF
IMM GRANULOCYTES # BLD AUTO: 0.06 10*3/MM3 (ref 0–0.05)
IMM GRANULOCYTES NFR BLD AUTO: 0.5 % (ref 0–0.5)
INR PPP: 1.13 (ref 0.91–1.09)
KETONES UR QL STRIP: NEGATIVE
LEUKOCYTE ESTERASE UR QL STRIP.AUTO: ABNORMAL
LIPASE SERPL-CCNC: 23 U/L (ref 13–60)
LYMPHOCYTES # BLD AUTO: 0.81 10*3/MM3 (ref 0.7–3.1)
LYMPHOCYTES NFR BLD AUTO: 6.2 % (ref 19.6–45.3)
M PNEUMO IGG SER IA-ACNC: NOT DETECTED
MCH RBC QN AUTO: 29.8 PG (ref 26.6–33)
MCHC RBC AUTO-ENTMCNC: 33.9 G/DL (ref 31.5–35.7)
MCV RBC AUTO: 87.8 FL (ref 79–97)
MONOCYTES # BLD AUTO: 1.04 10*3/MM3 (ref 0.1–0.9)
MONOCYTES NFR BLD AUTO: 8 % (ref 5–12)
NEUTROPHILS NFR BLD AUTO: 11.08 10*3/MM3 (ref 1.7–7)
NEUTROPHILS NFR BLD AUTO: 85.1 % (ref 42.7–76)
NITRITE UR QL STRIP: POSITIVE
NRBC BLD AUTO-RTO: 0 /100 WBC (ref 0–0.2)
PH UR STRIP.AUTO: 6 [PH] (ref 5–8)
PLATELET # BLD AUTO: 227 10*3/MM3 (ref 140–450)
PMV BLD AUTO: 9.3 FL (ref 6–12)
POTASSIUM SERPL-SCNC: 4.5 MMOL/L (ref 3.5–5.2)
PROCALCITONIN SERPL-MCNC: 0.34 NG/ML (ref 0–0.25)
PROT SERPL-MCNC: 7.3 G/DL (ref 6–8.5)
PROT UR QL STRIP: ABNORMAL
PROTHROMBIN TIME: 14.1 SECONDS (ref 11.9–14.6)
RBC # BLD AUTO: 4.84 10*6/MM3 (ref 4.14–5.8)
RBC # UR: ABNORMAL /HPF
REF LAB TEST METHOD: ABNORMAL
RHINOVIRUS RNA SPEC NAA+PROBE: NOT DETECTED
RSV RNA NPH QL NAA+NON-PROBE: NOT DETECTED
S PYO AG THROAT QL: NEGATIVE
SARS-COV-2 RNA NPH QL NAA+NON-PROBE: NOT DETECTED
SODIUM SERPL-SCNC: 136 MMOL/L (ref 136–145)
SP GR UR STRIP: 1.02 (ref 1–1.03)
SQUAMOUS #/AREA URNS HPF: ABNORMAL /HPF
UROBILINOGEN UR QL STRIP: ABNORMAL
WBC # BLD AUTO: 13.02 10*3/MM3 (ref 3.4–10.8)
WBC UR QL AUTO: ABNORMAL /HPF

## 2021-11-14 PROCEDURE — 81001 URINALYSIS AUTO W/SCOPE: CPT | Performed by: NURSE PRACTITIONER

## 2021-11-14 PROCEDURE — 93005 ELECTROCARDIOGRAM TRACING: CPT | Performed by: NURSE PRACTITIONER

## 2021-11-14 PROCEDURE — 25010000002 CEFTRIAXONE PER 250 MG: Performed by: NURSE PRACTITIONER

## 2021-11-14 PROCEDURE — 93010 ELECTROCARDIOGRAM REPORT: CPT | Performed by: INTERNAL MEDICINE

## 2021-11-14 PROCEDURE — 87081 CULTURE SCREEN ONLY: CPT | Performed by: NURSE PRACTITIONER

## 2021-11-14 PROCEDURE — 99284 EMERGENCY DEPT VISIT MOD MDM: CPT

## 2021-11-14 PROCEDURE — 25010000002 PIPERACILLIN SOD-TAZOBACTAM PER 1 G: Performed by: INTERNAL MEDICINE

## 2021-11-14 PROCEDURE — 85025 COMPLETE CBC W/AUTO DIFF WBC: CPT | Performed by: NURSE PRACTITIONER

## 2021-11-14 PROCEDURE — 74177 CT ABD & PELVIS W/CONTRAST: CPT

## 2021-11-14 PROCEDURE — 85610 PROTHROMBIN TIME: CPT | Performed by: NURSE PRACTITIONER

## 2021-11-14 PROCEDURE — 0202U NFCT DS 22 TRGT SARS-COV-2: CPT | Performed by: NURSE PRACTITIONER

## 2021-11-14 PROCEDURE — 87040 BLOOD CULTURE FOR BACTERIA: CPT | Performed by: NURSE PRACTITIONER

## 2021-11-14 PROCEDURE — 25010000002 IOPAMIDOL 61 % SOLUTION: Performed by: NURSE PRACTITIONER

## 2021-11-14 PROCEDURE — 80053 COMPREHEN METABOLIC PANEL: CPT | Performed by: NURSE PRACTITIONER

## 2021-11-14 PROCEDURE — 71045 X-RAY EXAM CHEST 1 VIEW: CPT

## 2021-11-14 PROCEDURE — 93005 ELECTROCARDIOGRAM TRACING: CPT | Performed by: INTERNAL MEDICINE

## 2021-11-14 PROCEDURE — 87186 SC STD MICRODIL/AGAR DIL: CPT | Performed by: NURSE PRACTITIONER

## 2021-11-14 PROCEDURE — 36415 COLL VENOUS BLD VENIPUNCTURE: CPT

## 2021-11-14 PROCEDURE — 83690 ASSAY OF LIPASE: CPT | Performed by: NURSE PRACTITIONER

## 2021-11-14 PROCEDURE — 84145 PROCALCITONIN (PCT): CPT | Performed by: NURSE PRACTITIONER

## 2021-11-14 PROCEDURE — 83605 ASSAY OF LACTIC ACID: CPT | Performed by: NURSE PRACTITIONER

## 2021-11-14 PROCEDURE — 87086 URINE CULTURE/COLONY COUNT: CPT | Performed by: NURSE PRACTITIONER

## 2021-11-14 PROCEDURE — 87880 STREP A ASSAY W/OPTIC: CPT | Performed by: NURSE PRACTITIONER

## 2021-11-14 PROCEDURE — 87077 CULTURE AEROBIC IDENTIFY: CPT | Performed by: NURSE PRACTITIONER

## 2021-11-14 RX ORDER — POLYETHYLENE GLYCOL 3350 17 G/17G
17 POWDER, FOR SOLUTION ORAL DAILY
Status: DISCONTINUED | OUTPATIENT
Start: 2021-11-14 | End: 2021-11-17 | Stop reason: HOSPADM

## 2021-11-14 RX ORDER — NALOXONE HCL 0.4 MG/ML
0.4 VIAL (ML) INJECTION
Status: DISCONTINUED | OUTPATIENT
Start: 2021-11-14 | End: 2021-11-16

## 2021-11-14 RX ORDER — SODIUM CHLORIDE, SODIUM LACTATE, POTASSIUM CHLORIDE, CALCIUM CHLORIDE 600; 310; 30; 20 MG/100ML; MG/100ML; MG/100ML; MG/100ML
125 INJECTION, SOLUTION INTRAVENOUS CONTINUOUS
Status: DISCONTINUED | OUTPATIENT
Start: 2021-11-14 | End: 2021-11-16

## 2021-11-14 RX ORDER — TAMSULOSIN HYDROCHLORIDE 0.4 MG/1
0.4 CAPSULE ORAL NIGHTLY
Status: DISCONTINUED | OUTPATIENT
Start: 2021-11-14 | End: 2021-11-17 | Stop reason: HOSPADM

## 2021-11-14 RX ORDER — MORPHINE SULFATE 2 MG/ML
1 INJECTION, SOLUTION INTRAMUSCULAR; INTRAVENOUS EVERY 4 HOURS PRN
Status: DISCONTINUED | OUTPATIENT
Start: 2021-11-14 | End: 2021-11-16

## 2021-11-14 RX ORDER — SODIUM CHLORIDE 0.9 % (FLUSH) 0.9 %
10 SYRINGE (ML) INJECTION AS NEEDED
Status: DISCONTINUED | OUTPATIENT
Start: 2021-11-14 | End: 2021-11-17 | Stop reason: HOSPADM

## 2021-11-14 RX ORDER — AMOXICILLIN 250 MG
1 CAPSULE ORAL 2 TIMES DAILY
Status: DISCONTINUED | OUTPATIENT
Start: 2021-11-14 | End: 2021-11-17 | Stop reason: HOSPADM

## 2021-11-14 RX ORDER — SUCRALFATE ORAL 1 G/10ML
1 SUSPENSION ORAL ONCE
Status: COMPLETED | OUTPATIENT
Start: 2021-11-15 | End: 2021-11-15

## 2021-11-14 RX ORDER — ACETAMINOPHEN 325 MG/1
650 TABLET ORAL EVERY 4 HOURS PRN
Status: DISCONTINUED | OUTPATIENT
Start: 2021-11-14 | End: 2021-11-17 | Stop reason: HOSPADM

## 2021-11-14 RX ORDER — PHENAZOPYRIDINE HYDROCHLORIDE 200 MG/1
200 TABLET, FILM COATED ORAL
Status: COMPLETED | OUTPATIENT
Start: 2021-11-14 | End: 2021-11-16

## 2021-11-14 RX ORDER — SODIUM CHLORIDE 0.9 % (FLUSH) 0.9 %
10 SYRINGE (ML) INJECTION EVERY 12 HOURS SCHEDULED
Status: DISCONTINUED | OUTPATIENT
Start: 2021-11-14 | End: 2021-11-17 | Stop reason: HOSPADM

## 2021-11-14 RX ORDER — ONDANSETRON 2 MG/ML
4 INJECTION INTRAMUSCULAR; INTRAVENOUS EVERY 6 HOURS PRN
Status: DISCONTINUED | OUTPATIENT
Start: 2021-11-14 | End: 2021-11-17 | Stop reason: HOSPADM

## 2021-11-14 RX ORDER — IBUPROFEN 400 MG/1
600 TABLET ORAL ONCE
Status: COMPLETED | OUTPATIENT
Start: 2021-11-14 | End: 2021-11-14

## 2021-11-14 RX ORDER — ACETAMINOPHEN 500 MG
1000 TABLET ORAL ONCE
Status: DISCONTINUED | OUTPATIENT
Start: 2021-11-14 | End: 2021-11-17 | Stop reason: HOSPADM

## 2021-11-14 RX ADMIN — POLYETHYLENE GLYCOL 3350 17 G: 17 POWDER, FOR SOLUTION ORAL at 20:33

## 2021-11-14 RX ADMIN — PIPERACILLIN SODIUM AND TAZOBACTAM SODIUM 3.38 G: 3; .375 INJECTION, POWDER, LYOPHILIZED, FOR SOLUTION INTRAVENOUS at 19:37

## 2021-11-14 RX ADMIN — PHENAZOPYRIDINE HYDROCHLORIDE 200 MG: 200 TABLET ORAL at 20:30

## 2021-11-14 RX ADMIN — SODIUM CHLORIDE, POTASSIUM CHLORIDE, SODIUM LACTATE AND CALCIUM CHLORIDE 125 ML/HR: 600; 310; 30; 20 INJECTION, SOLUTION INTRAVENOUS at 19:37

## 2021-11-14 RX ADMIN — IBUPROFEN 600 MG: 400 TABLET, FILM COATED ORAL at 16:37

## 2021-11-14 RX ADMIN — SODIUM CHLORIDE 1 G: 9 INJECTION, SOLUTION INTRAVENOUS at 17:15

## 2021-11-14 RX ADMIN — SODIUM CHLORIDE, POTASSIUM CHLORIDE, SODIUM LACTATE AND CALCIUM CHLORIDE 1500 ML: 600; 310; 30; 20 INJECTION, SOLUTION INTRAVENOUS at 19:37

## 2021-11-14 RX ADMIN — DOCUSATE SODIUM 50 MG AND SENNOSIDES 8.6 MG 1 TABLET: 8.6; 5 TABLET, FILM COATED ORAL at 20:31

## 2021-11-14 RX ADMIN — SODIUM CHLORIDE, POTASSIUM CHLORIDE, SODIUM LACTATE AND CALCIUM CHLORIDE 1000 ML: 600; 310; 30; 20 INJECTION, SOLUTION INTRAVENOUS at 17:16

## 2021-11-14 RX ADMIN — TAMSULOSIN HYDROCHLORIDE 0.4 MG: 0.4 CAPSULE ORAL at 20:31

## 2021-11-14 RX ADMIN — IOPAMIDOL 100 ML: 612 INJECTION, SOLUTION INTRAVENOUS at 16:52

## 2021-11-14 NOTE — ED PROVIDER NOTES
Subjective   56 yom with PMH of GERD, thyroid disease, hyperlipidemia, HTN, irregular heart rhythm and nephrolithiasis presents with his mother with c/o fever, headache and overall feeling bad. His mother states he has had intermittent symptoms x 3 days.  She states his fever has been responding to tylenol.  She states it has been as high as 103.  He denies cough.  He denies SOB or CP.  He denies n/v/d or dysuria.  Mother states he has chronic abdomen pain.  It does not appear to be worse than his baseline.           Review of Systems   Constitutional: Positive for fatigue and fever. Negative for activity change and appetite change.   HENT: Negative for congestion, ear pain, facial swelling and sore throat.    Eyes: Negative for discharge and visual disturbance.   Respiratory: Negative for apnea, chest tightness, shortness of breath, wheezing and stridor.    Cardiovascular: Negative for chest pain and palpitations.   Gastrointestinal: Negative for abdominal distention, abdominal pain, diarrhea, nausea and vomiting.   Genitourinary: Negative for difficulty urinating and dysuria.   Musculoskeletal: Negative for arthralgias and myalgias.   Skin: Negative for rash and wound.   Neurological: Positive for headaches. Negative for dizziness and seizures.   Psychiatric/Behavioral: Negative for agitation and confusion.       Past Medical History:   Diagnosis Date   • Disease of thyroid gland    • Elevated cholesterol    • GERD (gastroesophageal reflux disease)    • Hyperlipidemia    • Hypertension    • Irregular cardiac rhythm    • Lipoma    • Nephrolithiasis 6/18/2021       No Known Allergies    Past Surgical History:   Procedure Laterality Date   • COLONOSCOPY N/A 6/9/2021    Procedure: COLONOSCOPY WITH ANESTHESIA;  Surgeon: Willian Bajwa DO;  Location: [facility] ENDOSCOPY;  Service: Gastroenterology;  Laterality: N/A;  preop; abdominal pain  postop; rectal ulcer   PCP Edgar Gomez    • ENDOSCOPY N/A 12/16/2019     Procedure: ESOPHAGOGASTRODUODENOSCOPY WITH ANESTHESIA;  Surgeon: Willian Bajwa DO;  Location: [facility] ENDOSCOPY;  Service: Gastroenterology   • EXTRACORPOREAL SHOCK WAVE LITHOTRIPSY (ESWL) Right 8/2/2021    Procedure: EXTRACORPOREAL SHOCKWAVE LITHOTRIPSY RIGHT;  Surgeon: Mega Muñoz MD;  Location: [facility] OR;  Service: Urology;  Laterality: Right;   • GALLBLADDER SURGERY     • LIPOMA EXCISION     • TONSILLECTOMY         Family History   Problem Relation Age of Onset   • Hypertension Mother    • Heart disease Mother    • Stroke Father    • No Known Problems Sister    • No Known Problems Brother    • No Known Problems Daughter    • No Known Problems Son    • Heart disease Maternal Grandmother    • No Known Problems Paternal Grandmother    • No Known Problems Maternal Aunt    • No Known Problems Paternal Aunt    • BRCA 1/2 Neg Hx    • Breast cancer Neg Hx    • Colon cancer Neg Hx    • Endometrial cancer Neg Hx    • Ovarian cancer Neg Hx    • Colon polyps Neg Hx        Social History     Socioeconomic History   • Marital status: Single   Tobacco Use   • Smoking status: Former Smoker     Packs/day: 0.50     Types: Cigarettes   • Smokeless tobacco: Never Used   • Tobacco comment: 34 YRS AGO   Vaping Use   • Vaping Use: Never used   Substance and Sexual Activity   • Alcohol use: No   • Drug use: No   • Sexual activity: Defer           Objective   Physical Exam  Vitals and nursing note reviewed.   Constitutional:       Appearance: He is well-developed.   HENT:      Head: Normocephalic.   Eyes:      Pupils: Pupils are equal, round, and reactive to light.   Cardiovascular:      Rate and Rhythm: Regular rhythm. Tachycardia present.      Heart sounds: No murmur heard.      Pulmonary:      Effort: Pulmonary effort is normal.      Breath sounds: Normal breath sounds.   Abdominal:      General: Bowel sounds are normal. There is no distension.      Palpations: Abdomen is soft.      Tenderness: There is no abdominal  tenderness. There is no right CVA tenderness or left CVA tenderness.   Musculoskeletal:         General: Normal range of motion.      Cervical back: Normal range of motion and neck supple.   Skin:     General: Skin is warm and dry.   Neurological:      Mental Status: He is alert.      Comments: He answers basic questions appropriately.  His mother answers most questions for him.         Procedures           ED Course  ED Course as of 11/15/21 1500   Sun Nov 14, 2021   1559 His mom is at the desk concerned he might have a fever.  We will recheck his v/s.  He does have 4+bacteria, moderate leukocytes and positive nitrites in his urine.  Rocephin Iv ordered. [KS]   1626 T103.  Motrin ordered.  [KS]   1720 I discussed the patient's case with Dr Gómez.  He will be admitted to the hospitalist service.  The patient and his mother are aware of admission. [KS]      ED Course User Index  [KS] Shoulders, Kristee Croft, APRN                                           MDM  Number of Diagnoses or Management Options  Pyelonephritis: new and requires workup     Amount and/or Complexity of Data Reviewed  Clinical lab tests: ordered and reviewed  Tests in the radiology section of CPT®: ordered and reviewed  Discuss the patient with other providers: yes    Risk of Complications, Morbidity, and/or Mortality  Presenting problems: minimal  Diagnostic procedures: minimal  Management options: minimal    Patient Progress  Patient progress: stable      Final diagnoses:   Pyelonephritis       ED Disposition  ED Disposition     ED Disposition Condition Comment    Decision to Admit  Level of Care: Med/Surg [1]   Diagnosis: Pyelonephritis [[id number]]   Admitting Physician: PERRI KIM [[id number]]   Attending Physician: PERRI KIM [[id number]]   Certification: I Certify That Inpatient Hospital Services Are Medically Necessary For Greater Than 2 Midnights            No follow-up provider specified.       Medication List      No changes were  made to your prescriptions during this visit.          Imer Valerio, APRN  11/15/21 [id number]

## 2021-11-14 NOTE — H&P
"    [facility] Medicine Services  HISTORY AND PHYSICAL    Date of Admission: 11/14/2021  Primary Care Physician: Edgar Gomez Jr., MD    Subjective     Chief Complaint: Fever, malaise    History of Present Illness  Patient is a 56-year-old  male with past medical history significant for kidney stones, gastroesophageal reflux disease, hypothyroidism that presented to our hospital secondary to ongoing fever and malaise.  Patient states that he started feeling poorly last week, but felt like his symptoms really took a turn for the worst on Friday (2 days ago).  He reports sleeping most of the day yesterday.  He had a fever as high as 103.9.  He has noticed malodorous urine that is also much more cloudy and dark in nature as compared to normal.  He has a little bit of flank pain on the right, but states that those symptoms are not severe.  He has been having a headache.  He reports that his appetite has been very poor as compared to his norm.  He does report having a history of kidney stones managed by Dr. Muñoz of urology on an outpatient basis.  He denies having prominent dysuria.  He reports having a bladder infection possibly x1 in the past however this has not been a recurrent issue for him.  No chest pain or shortness of breath.  The only new medications he has been taking recently are over-the-counter medications for fever.  His mother is at bedside and corroborates his history as well.  She states she fixed him some pancakes to eat, describing that normally he eats a lot of pancakes, but she stated \"he would hardly touch them.\"  Patient denies any vomiting.    Review of Systems     Otherwise complete ROS reviewed and negative except as mentioned in the HPI.    Past Medical History:   Past Medical History:   Diagnosis Date   • Disease of thyroid gland    • Elevated cholesterol    • GERD (gastroesophageal reflux disease)    • Hyperlipidemia    • Hypertension    • " Irregular cardiac rhythm    • Lipoma    • Nephrolithiasis 6/18/2021     Past Surgical History:  Past Surgical History:   Procedure Laterality Date   • COLONOSCOPY N/A 6/9/2021    Procedure: COLONOSCOPY WITH ANESTHESIA;  Surgeon: Willian Bajwa DO;  Location: [facility] ENDOSCOPY;  Service: Gastroenterology;  Laterality: N/A;  preop; abdominal pain  postop; rectal ulcer   PCP Edgar Gomez    • ENDOSCOPY N/A 12/16/2019    Procedure: ESOPHAGOGASTRODUODENOSCOPY WITH ANESTHESIA;  Surgeon: Willian Bajwa DO;  Location: [facility] ENDOSCOPY;  Service: Gastroenterology   • EXTRACORPOREAL SHOCK WAVE LITHOTRIPSY (ESWL) Right 8/2/2021    Procedure: EXTRACORPOREAL SHOCKWAVE LITHOTRIPSY RIGHT;  Surgeon: Mega Muñoz MD;  Location: [facility] OR;  Service: Urology;  Laterality: Right;   • GALLBLADDER SURGERY     • LIPOMA EXCISION     • TONSILLECTOMY       Social History:  reports that he has quit smoking. His smoking use included cigarettes. He smoked 0.50 packs per day. He has never used smokeless tobacco. He reports that he does not drink alcohol and does not use drugs.    Family History: family history includes Heart disease in his maternal grandmother and mother; Hypertension in his mother; No Known Problems in his brother, daughter, maternal aunt, paternal aunt, paternal grandmother, sister, and son; Stroke in his father.       Allergies:  No Known Allergies    Medications:  Prior to Admission medications    Medication Sig Start Date End Date Taking? Authorizing Provider   amitriptyline (ELAVIL) 50 MG tablet Take 50 mg by mouth Every Night.    Jey Berrios MD   aspirin 81 MG EC tablet Take 81 mg by mouth Daily.    Jey Berrios MD   Euthyrox 75 MCG tablet TAKE 1 TABLET BY MOUTH ONCE DAILY IN THE MORNING ON AN EMPTY STOMACH 5/13/21   Jey Berrios MD   fenofibrate (TRICOR) 145 MG tablet Take 145 mg by mouth Every Night.    Jey Berrios MD   HYDROcodone-acetaminophen (NORCO) 7.5-325 MG  "per tablet Take 1 tablet by mouth Every 6 (Six) Hours As Needed for Moderate Pain . 8/2/21   Mega Muñoz MD   levothyroxine sodium (TIROSINT) 50 MCG capsule Take 50 mcg by mouth Every Night.    ProviderJey MD   metoprolol tartrate (LOPRESSOR) 100 MG tablet Take 100 mg by mouth Daily.    Jey Berrios MD   OLANZapine (zyPREXA) 10 MG tablet Take 10 mg by mouth Every Night.    ProviderJey MD   ondansetron ODT (ZOFRAN-ODT) 4 MG disintegrating tablet Place 1 tablet on the tongue Every 6 (Six) Hours As Needed for Nausea. 5/25/21   Jennifer Olmedo APRN   pantoprazole (PROTONIX) 40 MG EC tablet Take 40 mg by mouth Every Night.    Jey Berrios MD   tamsulosin (FLOMAX) 0.4 MG capsule 24 hr capsule Take 1 capsule by mouth Every Night. 8/2/21   Mega Muñoz MD     I have utilized all available immediate resources to obtain, update, and review the patient's current medications.    Objective     Vital Signs: /98   Pulse 117   Temp (!) 103.2 °F (39.6 °C) (Oral)   Resp 19   Ht 175.3 cm (69\")   Wt 77.1 kg (170 lb)   SpO2 95%   BMI 25.10 kg/m²   Physical Exam  Vitals reviewed.   Constitutional:       Appearance: He is ill-appearing. He is not toxic-appearing.   HENT:      Head: Normocephalic.      Mouth/Throat:      Mouth: Mucous membranes are dry.      Pharynx: No oropharyngeal exudate.   Eyes:      Pupils: Pupils are equal, round, and reactive to light.   Cardiovascular:      Rate and Rhythm: Regular rhythm. Tachycardia present.   Pulmonary:      Effort: Pulmonary effort is normal. No respiratory distress.      Breath sounds: No wheezing or rales.   Abdominal:      General: There is no distension.      Tenderness: There is abdominal tenderness (mild left lateral TTP). There is no guarding or rebound.      Comments: No significant CVA tenderness   Musculoskeletal:         General: No swelling.      Cervical back: Neck supple.   Skin:     General: Skin is " warm.      Capillary Refill: Capillary refill takes less than 2 seconds.      Comments: feverish   Neurological:      General: No focal deficit present.      Mental Status: He is alert.   Psychiatric:         Mood and Affect: Mood normal.          Results Reviewed:  Lab Results (last 24 hours)     Procedure Component Value Units Date/Time    Blood Culture - Blood, Arm, Left [[id number]] Collected: 11/14/21 1714    Specimen: Blood from Arm, Left Updated: 11/14/21 1727    COVID PRE-OP / PRE-PROCEDURE SCREENING ORDER (NO ISOLATION) - Swab, Nasopharynx [[id number]]  (Normal) Collected: 11/14/21 1515    Specimen: Swab from Nasopharynx Updated: 11/14/21 1631    Narrative:      The following orders were created for panel order COVID PRE-OP / PRE-PROCEDURE SCREENING ORDER (NO ISOLATION) - Swab, Nasopharynx.  Procedure                               Abnormality         Status                     ---------                               -----------         ------                     Respiratory Panel PCR w/...[[id number]]  Normal              Final result                 Please view results for these tests on the individual orders.    Respiratory Panel PCR w/COVID-19(SARS-CoV-2) [facility]/JOHN/PAD/[facility]/SUNNY In-House, NP Swab in UTM/VTM, 3-4 HR TAT - Swab, Nasopharynx [[id number]]  (Normal) Collected: 11/14/21 1515    Specimen: Swab from Nasopharynx Updated: 11/14/21 1631     ADENOVIRUS, PCR Not Detected     Coronavirus 229E Not Detected     Coronavirus HKU1 Not Detected     Coronavirus NL63 Not Detected     Coronavirus OC43 Not Detected     COVID19 Not Detected     Human Metapneumovirus Not Detected     Human Rhinovirus/Enterovirus Not Detected     Influenza A PCR Not Detected     Influenza B PCR Not Detected     Parainfluenza Virus 1 Not Detected     Parainfluenza Virus 2 Not Detected     Parainfluenza Virus 3 Not Detected     Parainfluenza Virus 4 Not Detected     RSV, PCR Not Detected     Bordetella pertussis pcr Not Detected      "Bordetella parapertussis PCR Not Detected     Chlamydophila pneumoniae PCR Not Detected     Mycoplasma pneumo by PCR Not Detected    Narrative:      In the setting of a positive respiratory panel with a viral infection PLUS a negative procalcitonin without other underlying concern for bacterial infection, consider observing off antibiotics or discontinuation of antibiotics and continue supportive care. If the respiratory panel is positive for atypical bacterial infection (Bordetella pertussis, Chlamydophila pneumoniae, or Mycoplasma pneumoniae), consider antibiotic de-escalation to target atypical bacterial infection.    Rapid Strep A Screen - Swab, Throat [[id number]]  (Normal) Collected: 11/14/21 1515    Specimen: Swab from Throat Updated: 11/14/21 1557     Strep A Ag Negative    Beta Strep Culture, Throat - Swab, Throat [[id number]] Collected: 11/14/21 1515    Specimen: Swab from Throat Updated: 11/14/21 1556    Procalcitonin [[id number]]  (Abnormal) Collected: 11/14/21 1515    Specimen: Blood Updated: 11/14/21 1554     Procalcitonin 0.34 ng/mL     Narrative:      As a Marker for Sepsis (Non-Neonates):     1. <0.5 ng/mL represents a low risk of severe sepsis and/or septic shock.  2. >2 ng/mL represents a high risk of severe sepsis and/or septic shock.    As a Marker for Lower Respiratory Tract Infections that require antibiotic therapy:  PCT on Admission     Antibiotic Therapy             6-12 Hrs later  >0.5                          Strongly Recommended            >0.25 - <0.5             Recommended  0.1 - 0.25                  Discouraged                       Remeasure/reassess PCT  <0.1                         Strongly Discouraged         Remeasure/reassess PCT      As 28 day mortality risk marker: \"Change in Procalcitonin Result\" (>80% or <=80%) if Day 0 (or Day 1) and Day 4 values are available. Refer to http://www.[employer]-pct-calculator.com/    Change in PCT <=80 %   A decrease of PCT levels below or equal to " 80% defines a positive change in PCT test result representing a higher risk for 28-day all-cause mortality of patients diagnosed with severe sepsis or septic shock.    Change in PCT >80 %   A decrease of PCT levels of more than 80% defines a negative change in PCT result representing a lower risk for 28-day all-cause mortality of patients diagnosed with severe sepsis or septic shock.                Urinalysis With Culture If Indicated - Urine, Clean Catch [[id number]]  (Abnormal) Collected: 11/14/21 1523    Specimen: Urine, Clean Catch Updated: 11/14/21 1551     Color, UA Yellow     Appearance, UA Cloudy     pH, UA 6.0     Specific Gravity, UA 1.018     Glucose, UA Negative     Ketones, UA Negative     Bilirubin, UA Negative     Blood, UA Moderate (2+)     Protein,  mg/dL (2+)     Leuk Esterase, UA Moderate (2+)     Nitrite, UA Positive     Urobilinogen, UA 0.2 E.U./dL    Urinalysis, Microscopic Only - Urine, Clean Catch [[id number]]  (Abnormal) Collected: 11/14/21 1523    Specimen: Urine, Clean Catch Updated: 11/14/21 1551     RBC, UA 3-5 /HPF      WBC, UA Too Numerous to Count /HPF      Bacteria, UA 4+ /HPF      Squamous Epithelial Cells, UA 0-2 /HPF      Hyaline Casts, UA 0-2 /LPF      Methodology Automated Microscopy    Urine Culture - Urine, Urine, Clean Catch [[id number]] Collected: 11/14/21 1523    Specimen: Urine, Clean Catch Updated: 11/14/21 1551    Comprehensive Metabolic Panel [[id number]]  (Abnormal) Collected: 11/14/21 1515    Specimen: Blood Updated: 11/14/21 1549     Glucose 123 mg/dL      BUN 15 mg/dL      Creatinine 0.90 mg/dL      Sodium 136 mmol/L      Potassium 4.5 mmol/L      Chloride 98 mmol/L      CO2 27.0 mmol/L      Calcium 9.5 mg/dL      Total Protein 7.3 g/dL      Albumin 4.00 g/dL      ALT (SGPT) 54 U/L      AST (SGOT) 46 U/L      Alkaline Phosphatase 72 U/L      Total Bilirubin 0.4 mg/dL      eGFR Non African Amer 88 mL/min/1.73      Globulin 3.3 gm/dL      A/G Ratio 1.2 g/dL       BUN/Creatinine Ratio 16.7     Anion Gap 11.0 mmol/L     Narrative:      GFR Normal >60  Chronic Kidney Disease <60  Kidney Failure <15      Lactic Acid, Plasma [[id number]]  (Normal) Collected: 11/14/21 1515    Specimen: Blood Updated: 11/14/21 1546     Lactate 1.7 mmol/L     Lipase [[id number]]  (Normal) Collected: 11/14/21 1515    Specimen: Blood Updated: 11/14/21 1544     Lipase 23 U/L     Blood Culture - Blood, Arm, Right [[id number]] Collected: 11/14/21 1515    Specimen: Blood from Arm, Right Updated: 11/14/21 1540    Protime-INR [[id number]]  (Abnormal) Collected: 11/14/21 1515    Specimen: Blood Updated: 11/14/21 1537     Protime 14.1 Seconds      INR 1.13    CBC & Differential [[id number]]  (Abnormal) Collected: 11/14/21 1515    Specimen: Blood Updated: 11/14/21 1529    Narrative:      The following orders were created for panel order CBC & Differential.  Procedure                               Abnormality         Status                     ---------                               -----------         ------                     CBC Auto Differential[[id number]]        Abnormal            Final result                 Please view results for these tests on the individual orders.    CBC Auto Differential [[id number]]  (Abnormal) Collected: 11/14/21 1515    Specimen: Blood Updated: 11/14/21 1529     WBC 13.02 10*3/mm3      RBC 4.84 10*6/mm3      Hemoglobin 14.4 g/dL      Hematocrit 42.5 %      MCV 87.8 fL      MCH 29.8 pg      MCHC 33.9 g/dL      RDW 11.9 %      RDW-SD 38.5 fl      MPV 9.3 fL      Platelets 227 10*3/mm3      Neutrophil % 85.1 %      Lymphocyte % 6.2 %      Monocyte % 8.0 %      Eosinophil % 0.0 %      Basophil % 0.2 %      Immature Grans % 0.5 %      Neutrophils, Absolute 11.08 10*3/mm3      Lymphocytes, Absolute 0.81 10*3/mm3      Monocytes, Absolute 1.04 10*3/mm3      Eosinophils, Absolute 0.00 10*3/mm3      Basophils, Absolute 0.03 10*3/mm3      Immature Grans, Absolute 0.06 10*3/mm3      nRBC 0.0  /100 WBC         Imaging Results (Last 24 Hours)     Procedure Component Value Units Date/Time    CT Abdomen Pelvis With Contrast [[id number]] Collected: 11/14/21 1659     Updated: 11/14/21 1708    Narrative:      EXAMINATION: CT ABDOMEN PELVIS W CONTRAST- 11/14/2021 4:59 PM CST     HISTORY: Abdominal pain, acute, nonlocalized     DOSE: 230 mGycm (Automatic exposure control technique was implemented in  an effort to keep the radiation dose as low as possible without  compromising image quality)     REPORT: Spiral CT of the abdomen and pelvis was performed after  administration of intravenous contrast from the lung bases through the  pubic symphysis. Reconstructed coronal and sagittal images are also  reviewed.     COMPARISON: CT abdomen pelvis with contrast 5/25/2021.     Review of lung windows demonstrates normal aeration of the lung bases.  Cholecystectomy clips are present. There is a small subcentimeter focus  of decreased attenuation within the liver dome axial images 11 and 12,  stable, small hemangioma is favored. The stomach is decompressed. The  spleen is homogeneous, normal in size. There is dilation of the common  bile duct to 10 mm, most likely related to a reservoir effect. The  pancreas and adrenal glands are within normal limits. There is patchy  enhancement of both kidneys, greater on the left, suspicious for  pyelonephritis. No renal abscess is seen. There is a dominant cyst at  the posterior cortex and inferior pole that is stable and measures  approximately 4.9 cm. Both renal arteries appear patent. The renal veins  are patent. There is no hydronephrosis. The ureters are decompressed.  Moderate stool volume is seen in the colon. The bladder appears within  normal limits. No free fluid or free air is seen. There is mild fluid  retention within pelvic small bowel loops, without associated wall  thickening. The appendix is normal. Review of bone windows is  unremarkable.       Impression:      1.  Patchy enhancement of both kidneys compatible with apparent acute  pyelonephritis, no renal abscess is identified. There is a dominant  benign appearing cyst at the inferior posterior left kidney measures 4.9  cm.  2. Evidence of previous cholecystectomy with a reservoir effect of the  extrahepatic bile ducts.  3. Small subcentimeter suspected hemangioma within the liver dome.  4. Based on the volume of stool within the colon, constipation is  likely. No bowel obstruction.  5. Retention of fluid within pelvic small bowel loops is nonspecific,  without wall thickening. Normal appendix.        This report was finalized on 11/14/2021 17:04 by Dr. Bruce Workman MD.    XR Chest 1 View [[id number]] Collected: 11/14/21 1519     Updated: 11/14/21 1523    Narrative:      EXAMINATION: XR CHEST 1 VW- 11/14/2021 3:19 PM CST     HISTORY: fever, tachycardia.     REPORT: A frontal view of the chest was obtained.     COMPARISON: Chest x-ray 11/27/2017.        The lungs are mildly hypoaerated, no focal infiltrate or pulmonary  consolidation is identified. No pneumothorax or effusion is identified.  Heart size is normal. The osseous structures show no acute findings.       Impression:      Shallow inspiration, no acute cardiopulmonary abnormality.     This report was finalized on 11/14/2021 15:20 by Dr. Bruce Workman MD.        I have personally reviewed and interpreted the radiology studies and ECG obtained at time of admission.     Assessment / Plan     Assessment:   Active Hospital Problems    Diagnosis    • Pyelonephritis    • Transaminitis    • Constipation    • Hypothyroid    • Essential hypertension    • Hyperlipidemia    • Nephrolithiasis      Added automatically from request for surgery [id number]       Plan:   1.  IV Zosyn  2.  1 L bolus administered in the ED; will repeat bolus of intravenous fluids, then continue maintenance fluids with LR thereafter.  3.  Follow-up urine culture  4.  Follow-up blood cultures  5.  Full  liquid diet for now  6.  SCDs  7.  IV morphine as needed for pain  8.  Repeat CBC with differential and CMP in the morning tomorrow  9.  Based upon the results of CT of the abdomen will also start stool softener and MiraLAX.  10.  We will need to confirm outpatient medication reconciliation and restart home medications as appropriate.  11.  Workup ongoing    Code Status/Advanced Care Plan: Full Code    The patient's surrogate decision maker is his mother who I spoke with at bedside this evening.        Electronically signed by Timoteo Ahumada MD, 11/14/21, 17:52 CST.

## 2021-11-15 ENCOUNTER — APPOINTMENT (OUTPATIENT)
Dept: CT IMAGING | Facility: HOSPITAL | Age: 56
End: 2021-11-15

## 2021-11-15 ENCOUNTER — APPOINTMENT (OUTPATIENT)
Dept: NEUROLOGY | Facility: HOSPITAL | Age: 56
End: 2021-11-15

## 2021-11-15 LAB
ALBUMIN SERPL-MCNC: 3.5 G/DL (ref 3.5–5.2)
ALBUMIN/GLOB SERPL: 1.4 G/DL
ALP SERPL-CCNC: 122 U/L (ref 39–117)
ALT SERPL W P-5'-P-CCNC: 200 U/L (ref 1–41)
ANION GAP SERPL CALCULATED.3IONS-SCNC: 11 MMOL/L (ref 5–15)
AST SERPL-CCNC: 282 U/L (ref 1–40)
BILIRUB SERPL-MCNC: 0.6 MG/DL (ref 0–1.2)
BUN SERPL-MCNC: 13 MG/DL (ref 6–20)
BUN/CREAT SERPL: 14.1 (ref 7–25)
CALCIUM SPEC-SCNC: 9 MG/DL (ref 8.6–10.5)
CHLORIDE SERPL-SCNC: 101 MMOL/L (ref 98–107)
CK SERPL-CCNC: 126 U/L (ref 20–200)
CLUMPED PLATELETS: PRESENT
CO2 SERPL-SCNC: 26 MMOL/L (ref 22–29)
CREAT SERPL-MCNC: 0.92 MG/DL (ref 0.76–1.27)
DEPRECATED RDW RBC AUTO: 38.3 FL (ref 37–54)
ERYTHROCYTE [DISTWIDTH] IN BLOOD BY AUTOMATED COUNT: 11.9 % (ref 12.3–15.4)
GFR SERPL CREATININE-BSD FRML MDRD: 86 ML/MIN/1.73
GIANT PLATELETS: ABNORMAL
GLOBULIN UR ELPH-MCNC: 2.5 GM/DL
GLUCOSE SERPL-MCNC: 111 MG/DL (ref 65–99)
HCT VFR BLD AUTO: 37.8 % (ref 37.5–51)
HGB BLD-MCNC: 12.7 G/DL (ref 13–17.7)
LYMPHOCYTES # BLD MANUAL: 0.19 10*3/MM3 (ref 0.7–3.1)
LYMPHOCYTES NFR BLD MANUAL: 11 % (ref 5–12)
LYMPHOCYTES NFR BLD MANUAL: 2 % (ref 19.6–45.3)
MCH RBC QN AUTO: 29.3 PG (ref 26.6–33)
MCHC RBC AUTO-ENTMCNC: 33.6 G/DL (ref 31.5–35.7)
MCV RBC AUTO: 87.3 FL (ref 79–97)
MONOCYTES # BLD AUTO: 1.03 10*3/MM3 (ref 0.1–0.9)
NEUTROPHILS # BLD AUTO: 8.11 10*3/MM3 (ref 1.7–7)
NEUTROPHILS NFR BLD MANUAL: 87 % (ref 42.7–76)
NEUTS VAC BLD QL SMEAR: ABNORMAL
NRBC BLD AUTO-RTO: 0 /100 WBC (ref 0–0.2)
PLATELET # BLD AUTO: 180 10*3/MM3 (ref 140–450)
PMV BLD AUTO: 9.4 FL (ref 6–12)
POLYCHROMASIA BLD QL SMEAR: ABNORMAL
POTASSIUM SERPL-SCNC: 4.2 MMOL/L (ref 3.5–5.2)
PROT SERPL-MCNC: 6 G/DL (ref 6–8.5)
QT INTERVAL: 310 MS
QT INTERVAL: 318 MS
QTC INTERVAL: 391 MS
QTC INTERVAL: 397 MS
RBC # BLD AUTO: 4.33 10*6/MM3 (ref 4.14–5.8)
SODIUM SERPL-SCNC: 138 MMOL/L (ref 136–145)
WBC # BLD AUTO: 9.32 10*3/MM3 (ref 3.4–10.8)

## 2021-11-15 PROCEDURE — 85025 COMPLETE CBC W/AUTO DIFF WBC: CPT | Performed by: INTERNAL MEDICINE

## 2021-11-15 PROCEDURE — 25010000002 MORPHINE SULFATE (PF) 2 MG/ML SOLUTION: Performed by: INTERNAL MEDICINE

## 2021-11-15 PROCEDURE — 85007 BL SMEAR W/DIFF WBC COUNT: CPT | Performed by: INTERNAL MEDICINE

## 2021-11-15 PROCEDURE — 0 IOPAMIDOL PER 1 ML: Performed by: FAMILY MEDICINE

## 2021-11-15 PROCEDURE — 95816 EEG AWAKE AND DROWSY: CPT

## 2021-11-15 PROCEDURE — 82550 ASSAY OF CK (CPK): CPT | Performed by: FAMILY MEDICINE

## 2021-11-15 PROCEDURE — 25010000002 MEROPENEM PER 100 MG: Performed by: FAMILY MEDICINE

## 2021-11-15 PROCEDURE — 80053 COMPREHEN METABOLIC PANEL: CPT | Performed by: INTERNAL MEDICINE

## 2021-11-15 PROCEDURE — 71275 CT ANGIOGRAPHY CHEST: CPT

## 2021-11-15 PROCEDURE — 25010000002 PIPERACILLIN SOD-TAZOBACTAM PER 1 G: Performed by: INTERNAL MEDICINE

## 2021-11-15 PROCEDURE — 95816 EEG AWAKE AND DROWSY: CPT | Performed by: PSYCHIATRY & NEUROLOGY

## 2021-11-15 RX ORDER — PANTOPRAZOLE SODIUM 40 MG/1
40 TABLET, DELAYED RELEASE ORAL
Status: DISCONTINUED | OUTPATIENT
Start: 2021-11-16 | End: 2021-11-17 | Stop reason: HOSPADM

## 2021-11-15 RX ORDER — METOPROLOL SUCCINATE 50 MG/1
50 TABLET, EXTENDED RELEASE ORAL DAILY
COMMUNITY

## 2021-11-15 RX ADMIN — PHENAZOPYRIDINE HYDROCHLORIDE 200 MG: 200 TABLET ORAL at 07:52

## 2021-11-15 RX ADMIN — SUCRALFATE 1 G: 1 SUSPENSION ORAL at 01:05

## 2021-11-15 RX ADMIN — MORPHINE SULFATE 1 MG: 2 INJECTION, SOLUTION INTRAMUSCULAR; INTRAVENOUS at 22:20

## 2021-11-15 RX ADMIN — SODIUM CHLORIDE, PRESERVATIVE FREE 10 ML: 5 INJECTION INTRAVENOUS at 09:49

## 2021-11-15 RX ADMIN — IOPAMIDOL 100 ML: 755 INJECTION, SOLUTION INTRAVENOUS at 20:22

## 2021-11-15 RX ADMIN — TAMSULOSIN HYDROCHLORIDE 0.4 MG: 0.4 CAPSULE ORAL at 20:33

## 2021-11-15 RX ADMIN — SODIUM CHLORIDE, POTASSIUM CHLORIDE, SODIUM LACTATE AND CALCIUM CHLORIDE 125 ML/HR: 600; 310; 30; 20 INJECTION, SOLUTION INTRAVENOUS at 05:31

## 2021-11-15 RX ADMIN — MORPHINE SULFATE 1 MG: 2 INJECTION, SOLUTION INTRAMUSCULAR; INTRAVENOUS at 00:06

## 2021-11-15 RX ADMIN — DOCUSATE SODIUM 50 MG AND SENNOSIDES 8.6 MG 1 TABLET: 8.6; 5 TABLET, FILM COATED ORAL at 09:49

## 2021-11-15 RX ADMIN — PHENAZOPYRIDINE HYDROCHLORIDE 200 MG: 200 TABLET ORAL at 17:28

## 2021-11-15 RX ADMIN — PIPERACILLIN SODIUM AND TAZOBACTAM SODIUM 3.38 G: 3; .375 INJECTION, POWDER, LYOPHILIZED, FOR SOLUTION INTRAVENOUS at 00:06

## 2021-11-15 RX ADMIN — MORPHINE SULFATE 1 MG: 2 INJECTION, SOLUTION INTRAMUSCULAR; INTRAVENOUS at 09:38

## 2021-11-15 RX ADMIN — SODIUM CHLORIDE, PRESERVATIVE FREE 10 ML: 5 INJECTION INTRAVENOUS at 20:32

## 2021-11-15 RX ADMIN — PIPERACILLIN SODIUM AND TAZOBACTAM SODIUM 3.38 G: 3; .375 INJECTION, POWDER, LYOPHILIZED, FOR SOLUTION INTRAVENOUS at 17:28

## 2021-11-15 RX ADMIN — ACETAMINOPHEN 650 MG: 325 TABLET, FILM COATED ORAL at 07:52

## 2021-11-15 RX ADMIN — POLYETHYLENE GLYCOL 3350 17 G: 17 POWDER, FOR SOLUTION ORAL at 09:45

## 2021-11-15 RX ADMIN — MEROPENEM 1 G: 1 INJECTION, POWDER, FOR SOLUTION INTRAVENOUS at 20:32

## 2021-11-15 RX ADMIN — ACETAMINOPHEN 650 MG: 325 TABLET, FILM COATED ORAL at 14:58

## 2021-11-15 RX ADMIN — MORPHINE SULFATE 1 MG: 2 INJECTION, SOLUTION INTRAMUSCULAR; INTRAVENOUS at 04:12

## 2021-11-15 RX ADMIN — SODIUM CHLORIDE, POTASSIUM CHLORIDE, SODIUM LACTATE AND CALCIUM CHLORIDE 125 ML/HR: 600; 310; 30; 20 INJECTION, SOLUTION INTRAVENOUS at 15:14

## 2021-11-15 RX ADMIN — DOCUSATE SODIUM 50 MG AND SENNOSIDES 8.6 MG 1 TABLET: 8.6; 5 TABLET, FILM COATED ORAL at 20:33

## 2021-11-15 RX ADMIN — PIPERACILLIN SODIUM AND TAZOBACTAM SODIUM 3.38 G: 3; .375 INJECTION, POWDER, LYOPHILIZED, FOR SOLUTION INTRAVENOUS at 09:46

## 2021-11-15 RX ADMIN — PHENAZOPYRIDINE HYDROCHLORIDE 200 MG: 200 TABLET ORAL at 12:59

## 2021-11-15 NOTE — PROGRESS NOTES
"    [facility] Medicine Services  INPATIENT PROGRESS NOTE    Patient Name: Frederick Simms  Date of Admission: 11/14/2021  Today's Date: 11/15/21  Length of Stay: 1  Primary Care Physician: Edgar Gomez Jr., MD    Subjective   Chief Complaint: Follow-up pyelonephritis  HPI   He was sitting up in bed resting comfortably with father at bedside.  He has a history of epilepsy and has been off of seizure medications for over 30 years.  Last known seizure was over 30 years ago.  States that he is concerned he might have had a seizure last night when he arrived to room 370.  States that he was \"shaking all over.\"   Reports he thinks he may have had a seizure again today as he thought he \"saw snow outside.\" He did have a fever yesterday afternoon 103.2.  Most recent temperature 98.3.  Leukocytosis has resolved.  Blood cultures in process.  Denies nausea, vomiting or abdominal pain.  States overall he is feeling better today.  Denies any pain.    Review of Systems   All pertinent negatives and positives are as above. All other systems have been reviewed and are negative unless otherwise stated.     Objective    Temp:  [98.3 °F (36.8 °C)-103.2 °F (39.6 °C)] 98.3 °F (36.8 °C)  Heart Rate:  [] 98  Resp:  [18-20] 18  BP: (111-147)/(67-98) 129/67  Physical Exam  Vitals reviewed.   Constitutional:       General: He is not in acute distress.     Appearance: He is not toxic-appearing.      Comments: Sitting up in bed.  Father bedside.  No acute distress.  Tolerating room air.  Discussed with his nurse, Shanthi.   HENT:      Head: Normocephalic and atraumatic.      Mouth/Throat:      Mouth: Mucous membranes are moist.      Pharynx: Oropharynx is clear.   Eyes:      Extraocular Movements: Extraocular movements intact.      Conjunctiva/sclera: Conjunctivae normal.      Pupils: Pupils are equal, round, and reactive to light.   Cardiovascular:      Rate and Rhythm: Normal rate and regular " rhythm.      Pulses: Normal pulses.      Heart sounds: No murmur heard.      Pulmonary:      Effort: Pulmonary effort is normal. No respiratory distress.      Breath sounds: Normal breath sounds. No wheezing or rhonchi.   Abdominal:      General: Bowel sounds are normal. There is no distension.      Palpations: Abdomen is soft.      Tenderness: There is no abdominal tenderness.   Musculoskeletal:         General: No swelling or tenderness. Normal range of motion.      Cervical back: Normal range of motion and neck supple. No muscular tenderness.   Skin:     General: Skin is warm and dry.      Findings: No erythema or rash.   Neurological:      General: No focal deficit present.      Mental Status: He is alert and oriented to person, place, and time.      Cranial Nerves: No cranial nerve deficit.      Motor: No weakness.   Psychiatric:         Mood and Affect: Mood normal.         Behavior: Behavior normal.       Results Review:  I have reviewed the labs, radiology results, and diagnostic studies.    Laboratory Data:   Results from last 7 days   Lab Units 11/15/21  0422 11/14/21  1515   WBC 10*3/mm3 9.32 13.02*   HEMOGLOBIN g/dL 12.7* 14.4   HEMATOCRIT % 37.8 42.5   PLATELETS 10*3/mm3 180 227        Results from last 7 days   Lab Units 11/15/21  0422 11/14/21  1515   SODIUM mmol/L 138 136   POTASSIUM mmol/L 4.2 4.5   CHLORIDE mmol/L 101 98   CO2 mmol/L 26.0 27.0   BUN mg/dL 13 15   CREATININE mg/dL 0.92 0.90   CALCIUM mg/dL 9.0 9.5   BILIRUBIN mg/dL 0.6 0.4   ALK PHOS U/L 122* 72   ALT (SGPT) U/L 200* 54*   AST (SGOT) U/L 282* 46*   GLUCOSE mg/dL 111* 123*     Radiology Data:   Imaging Results (Last 24 Hours)     Procedure Component Value Units Date/Time    CT Abdomen Pelvis With Contrast [[id number]] Collected: 11/14/21 1659     Updated: 11/14/21 1708    Narrative:      EXAMINATION: CT ABDOMEN PELVIS W CONTRAST- 11/14/2021 4:59 PM CST     HISTORY: Abdominal pain, acute, nonlocalized     DOSE: 230 mGycm (Automatic  exposure control technique was implemented in  an effort to keep the radiation dose as low as possible without  compromising image quality)     REPORT: Spiral CT of the abdomen and pelvis was performed after  administration of intravenous contrast from the lung bases through the  pubic symphysis. Reconstructed coronal and sagittal images are also  reviewed.     COMPARISON: CT abdomen pelvis with contrast 5/25/2021.     Review of lung windows demonstrates normal aeration of the lung bases.  Cholecystectomy clips are present. There is a small subcentimeter focus  of decreased attenuation within the liver dome axial images 11 and 12,  stable, small hemangioma is favored. The stomach is decompressed. The  spleen is homogeneous, normal in size. There is dilation of the common  bile duct to 10 mm, most likely related to a reservoir effect. The  pancreas and adrenal glands are within normal limits. There is patchy  enhancement of both kidneys, greater on the left, suspicious for  pyelonephritis. No renal abscess is seen. There is a dominant cyst at  the posterior cortex and inferior pole that is stable and measures  approximately 4.9 cm. Both renal arteries appear patent. The renal veins  are patent. There is no hydronephrosis. The ureters are decompressed.  Moderate stool volume is seen in the colon. The bladder appears within  normal limits. No free fluid or free air is seen. There is mild fluid  retention within pelvic small bowel loops, without associated wall  thickening. The appendix is normal. Review of bone windows is  unremarkable.       Impression:      1. Patchy enhancement of both kidneys compatible with apparent acute  pyelonephritis, no renal abscess is identified. There is a dominant  benign appearing cyst at the inferior posterior left kidney measures 4.9  cm.  2. Evidence of previous cholecystectomy with a reservoir effect of the  extrahepatic bile ducts.  3. Small subcentimeter suspected hemangioma within  the liver dome.  4. Based on the volume of stool within the colon, constipation is  likely. No bowel obstruction.  5. Retention of fluid within pelvic small bowel loops is nonspecific,  without wall thickening. Normal appendix.        This report was finalized on 11/14/2021 17:04 by Dr. Bruce Workman MD.    XR Chest 1 View [[id number]] Collected: 11/14/21 1519     Updated: 11/14/21 1523    Narrative:      EXAMINATION: XR CHEST 1 VW- 11/14/2021 3:19 PM CST     HISTORY: fever, tachycardia.     REPORT: A frontal view of the chest was obtained.     COMPARISON: Chest x-ray 11/27/2017.        The lungs are mildly hypoaerated, no focal infiltrate or pulmonary  consolidation is identified. No pneumothorax or effusion is identified.  Heart size is normal. The osseous structures show no acute findings.       Impression:      Shallow inspiration, no acute cardiopulmonary abnormality.     This report was finalized on 11/14/2021 15:20 by Dr. Bruce Workman MD.          I have reviewed the patient's current medications.     Assessment/Plan     Active Hospital Problems    Diagnosis    • Pyelonephritis    • Transaminitis    • Constipation    • Hypothyroid    • Essential hypertension    • Hyperlipidemia    • Nephrolithiasis      Added automatically from request for surgery [id number]       Plan:  1.  Patient presented on 11/14 with 2-day history of fever and malaise.  CT abdomen pelvis showed acute pyelonephritis.  Febrile 103.2.    2.  Patient has a history of epilepsy.  States the has been off of antiepileptics for over 30 years.  Last known seizure over 30 years ago.  Concern for possible seizure-like activity last night.  Obtain EEG.    3.  Continue IV Zosyn.  Await urine culture.  Blood cultures are in process.  Leukocytosis has resolved.  Procalcitonin 0.34.    4.  Advance diet as tolerated.    5.  Continue bowel regimen.    6.  Sequential compression devices for DVT prophylaxis.    7.  Need to confirm home medication  reconciliation list.  VIRAL Maki informed.  Will review and resume medications as appropriate when completed.    8.  CMP in a.m.    Discharge Planning: I expect the patient to be discharged to home in 1-2 days.    Electronically signed by BRITNEY Mandel, 11/15/21, 13:49 CST.

## 2021-11-15 NOTE — PLAN OF CARE
Goal Outcome Evaluation:  Plan of Care Reviewed With: patient           Outcome Summary: Patient c/o h/a & flank pain. PRN pain meds given with relief. IVF, IV abx, voiding, up ad kristin. Highest temp 100.6 today. Safety maintained

## 2021-11-15 NOTE — PLAN OF CARE
Goal Outcome Evaluation:  Plan of Care Reviewed With: patient        Progress: no change  Outcome Summary: IVF and IV ABX; up with sba; SCD's for VTE; medicated for pain x1; complained of sharp chest pain MD made aware EKG, carafate, and morphin given with good relief; resting between care; safety maintained

## 2021-11-16 LAB
ALBUMIN SERPL-MCNC: 3.2 G/DL (ref 3.5–5.2)
ALBUMIN/GLOB SERPL: 1.7 G/DL
ALP SERPL-CCNC: 103 U/L (ref 39–117)
ALT SERPL W P-5'-P-CCNC: 107 U/L (ref 1–41)
ANION GAP SERPL CALCULATED.3IONS-SCNC: 12 MMOL/L (ref 5–15)
AST SERPL-CCNC: 60 U/L (ref 1–40)
BACTERIA SPEC AEROBE CULT: ABNORMAL
BACTERIA SPEC AEROBE CULT: NORMAL
BILIRUB SERPL-MCNC: 0.3 MG/DL (ref 0–1.2)
BUN SERPL-MCNC: 10 MG/DL (ref 6–20)
BUN/CREAT SERPL: 14.1 (ref 7–25)
CALCIUM SPEC-SCNC: 8.4 MG/DL (ref 8.6–10.5)
CHLORIDE SERPL-SCNC: 98 MMOL/L (ref 98–107)
CO2 SERPL-SCNC: 24 MMOL/L (ref 22–29)
CREAT SERPL-MCNC: 0.71 MG/DL (ref 0.76–1.27)
GFR SERPL CREATININE-BSD FRML MDRD: 116 ML/MIN/1.73
GLOBULIN UR ELPH-MCNC: 1.9 GM/DL
GLUCOSE SERPL-MCNC: 110 MG/DL (ref 65–99)
POTASSIUM SERPL-SCNC: 4 MMOL/L (ref 3.5–5.2)
PROT SERPL-MCNC: 5.1 G/DL (ref 6–8.5)
SODIUM SERPL-SCNC: 134 MMOL/L (ref 136–145)

## 2021-11-16 PROCEDURE — 80053 COMPREHEN METABOLIC PANEL: CPT | Performed by: NURSE PRACTITIONER

## 2021-11-16 PROCEDURE — 25010000002 MORPHINE SULFATE (PF) 2 MG/ML SOLUTION: Performed by: INTERNAL MEDICINE

## 2021-11-16 PROCEDURE — 25010000002 CEFTRIAXONE PER 250 MG: Performed by: FAMILY MEDICINE

## 2021-11-16 PROCEDURE — 25010000002 MEROPENEM PER 100 MG: Performed by: FAMILY MEDICINE

## 2021-11-16 RX ORDER — LEVOTHYROXINE SODIUM 0.07 MG/1
75 TABLET ORAL
Status: DISCONTINUED | OUTPATIENT
Start: 2021-11-16 | End: 2021-11-17 | Stop reason: HOSPADM

## 2021-11-16 RX ORDER — METOPROLOL SUCCINATE 50 MG/1
50 TABLET, EXTENDED RELEASE ORAL DAILY
Status: DISCONTINUED | OUTPATIENT
Start: 2021-11-16 | End: 2021-11-17 | Stop reason: HOSPADM

## 2021-11-16 RX ORDER — OLANZAPINE 10 MG/1
10 TABLET ORAL NIGHTLY
Status: DISCONTINUED | OUTPATIENT
Start: 2021-11-16 | End: 2021-11-17 | Stop reason: HOSPADM

## 2021-11-16 RX ORDER — ASPIRIN 81 MG/1
81 TABLET ORAL DAILY
Status: DISCONTINUED | OUTPATIENT
Start: 2021-11-16 | End: 2021-11-17 | Stop reason: HOSPADM

## 2021-11-16 RX ORDER — AMITRIPTYLINE HYDROCHLORIDE 25 MG/1
50 TABLET, FILM COATED ORAL NIGHTLY
Status: DISCONTINUED | OUTPATIENT
Start: 2021-11-16 | End: 2021-11-17 | Stop reason: HOSPADM

## 2021-11-16 RX ADMIN — PHENAZOPYRIDINE HYDROCHLORIDE 200 MG: 200 TABLET ORAL at 08:05

## 2021-11-16 RX ADMIN — SODIUM CHLORIDE, PRESERVATIVE FREE 10 ML: 5 INJECTION INTRAVENOUS at 08:05

## 2021-11-16 RX ADMIN — METOPROLOL SUCCINATE 50 MG: 50 TABLET, FILM COATED, EXTENDED RELEASE ORAL at 10:08

## 2021-11-16 RX ADMIN — PANTOPRAZOLE SODIUM 40 MG: 40 TABLET, DELAYED RELEASE ORAL at 06:38

## 2021-11-16 RX ADMIN — SODIUM CHLORIDE, POTASSIUM CHLORIDE, SODIUM LACTATE AND CALCIUM CHLORIDE 125 ML/HR: 600; 310; 30; 20 INJECTION, SOLUTION INTRAVENOUS at 00:38

## 2021-11-16 RX ADMIN — DOCUSATE SODIUM 50 MG AND SENNOSIDES 8.6 MG 1 TABLET: 8.6; 5 TABLET, FILM COATED ORAL at 08:04

## 2021-11-16 RX ADMIN — OLANZAPINE 10 MG: 10 TABLET, FILM COATED ORAL at 20:30

## 2021-11-16 RX ADMIN — AMITRIPTYLINE HYDROCHLORIDE 50 MG: 25 TABLET, FILM COATED ORAL at 20:30

## 2021-11-16 RX ADMIN — TAMSULOSIN HYDROCHLORIDE 0.4 MG: 0.4 CAPSULE ORAL at 20:29

## 2021-11-16 RX ADMIN — PHENAZOPYRIDINE HYDROCHLORIDE 200 MG: 200 TABLET ORAL at 14:41

## 2021-11-16 RX ADMIN — LEVOTHYROXINE SODIUM 75 MCG: 75 TABLET ORAL at 10:07

## 2021-11-16 RX ADMIN — DOCUSATE SODIUM 50 MG AND SENNOSIDES 8.6 MG 1 TABLET: 8.6; 5 TABLET, FILM COATED ORAL at 20:30

## 2021-11-16 RX ADMIN — MEROPENEM 1 G: 1 INJECTION, POWDER, FOR SOLUTION INTRAVENOUS at 02:35

## 2021-11-16 RX ADMIN — SODIUM CHLORIDE 1 G: 9 INJECTION, SOLUTION INTRAVENOUS at 14:41

## 2021-11-16 RX ADMIN — MEROPENEM 1 G: 1 INJECTION, POWDER, FOR SOLUTION INTRAVENOUS at 10:08

## 2021-11-16 RX ADMIN — SODIUM CHLORIDE, PRESERVATIVE FREE 10 ML: 5 INJECTION INTRAVENOUS at 20:30

## 2021-11-16 RX ADMIN — POLYETHYLENE GLYCOL 3350 17 G: 17 POWDER, FOR SOLUTION ORAL at 08:04

## 2021-11-16 RX ADMIN — ASPIRIN 81 MG: 81 TABLET, COATED ORAL at 10:08

## 2021-11-16 RX ADMIN — ACETAMINOPHEN 650 MG: 325 TABLET, FILM COATED ORAL at 08:04

## 2021-11-16 RX ADMIN — MORPHINE SULFATE 1 MG: 2 INJECTION, SOLUTION INTRAMUSCULAR; INTRAVENOUS at 02:35

## 2021-11-16 NOTE — PROGRESS NOTES
[facility] Medicine Services  INPATIENT PROGRESS NOTE    Patient Name: Frederick Simms  Date of Admission: 11/14/2021  Today's Date: 11/16/21  Length of Stay: 2  Primary Care Physician: Edgar Gomez Jr., MD    Subjective   Chief Complaint: Follow-up pyelonephritis  HPI   He was sitting up in bed resting comfortably with mother at bedside.  He had a fever yesterday afternoon, 102.5 °F.  Most recent temperature 98.4.  States he is feeling slightly better today.  Denies shortness of breath, chest pain or pressure.  CT angiogram chest showed no acute findings.  States he is tolerating regular diet.  He does complain of a mild headache for which he just recently received Tylenol.    Review of Systems   All pertinent negatives and positives are as above. All other systems have been reviewed and are negative unless otherwise stated.     Objective    Temp:  [97.4 °F (36.3 °C)-102.5 °F (39.2 °C)] 98.4 °F (36.9 °C)  Heart Rate:  [] 92  Resp:  [16-18] 16  BP: (129-142)/(67-83) 134/83  Physical Exam  Vitals reviewed.   Constitutional:       General: He is not in acute distress.     Appearance: He is not toxic-appearing.      Comments: Sitting up in bed.  Mother at bedside.  No acute distress.  Tolerating room air.    HENT:      Head: Normocephalic and atraumatic.      Mouth/Throat:      Mouth: Mucous membranes are moist.      Pharynx: Oropharynx is clear.   Eyes:      Extraocular Movements: Extraocular movements intact.      Conjunctiva/sclera: Conjunctivae normal.      Pupils: Pupils are equal, round, and reactive to light.   Cardiovascular:      Rate and Rhythm: Normal rate and regular rhythm.      Pulses: Normal pulses.      Heart sounds: No murmur heard.  Pulmonary:      Effort: Pulmonary effort is normal. No respiratory distress.      Breath sounds: Normal breath sounds. No wheezing or rhonchi.   Abdominal:      General: Bowel sounds are normal. There is no distension.       Palpations: Abdomen is soft.      Tenderness: There is no abdominal tenderness.   Musculoskeletal:         General: No swelling or tenderness. Normal range of motion.      Cervical back: Normal range of motion and neck supple. No muscular tenderness.   Skin:     General: Skin is warm and dry.      Findings: No erythema or rash.   Neurological:      General: No focal deficit present.      Mental Status: He is alert and oriented to person, place, and time.      Cranial Nerves: No cranial nerve deficit.      Motor: No weakness.   Psychiatric:         Mood and Affect: Mood normal.         Behavior: Behavior normal.     Results Review:  I have reviewed the labs, radiology results, and diagnostic studies.    Laboratory Data:   Results from last 7 days   Lab Units 11/15/21  0422 11/14/21  1515   WBC 10*3/mm3 9.32 13.02*   HEMOGLOBIN g/dL 12.7* 14.4   HEMATOCRIT % 37.8 42.5   PLATELETS 10*3/mm3 180 227        Results from last 7 days   Lab Units 11/16/21  0511 11/15/21  0422 11/14/21  1515   SODIUM mmol/L 134* 138 136   POTASSIUM mmol/L 4.0 4.2 4.5   CHLORIDE mmol/L 98 101 98   CO2 mmol/L 24.0 26.0 27.0   BUN mg/dL 10 13 15   CREATININE mg/dL 0.71* 0.92 0.90   CALCIUM mg/dL 8.4* 9.0 9.5   BILIRUBIN mg/dL 0.3 0.6 0.4   ALK PHOS U/L 103 122* 72   ALT (SGPT) U/L 107* 200* 54*   AST (SGOT) U/L 60* 282* 46*   GLUCOSE mg/dL 110* 111* 123*       Culture Data:   Blood Culture   Date Value Ref Range Status   11/14/2021 No growth at 24 hours  Preliminary     No results found for: BCIDPCR, CXREFLEX, CSFCX, CULTURETIS  No results found for: CULTURES, HSVCX, URCX  No results found for: EYECULTURE, GCCX, HSVCULTURE, LABHSV  No results found for: LEGIONELLA, MRSACX, MUMPSCX, MYCOPLASCX  No results found for: NOCARDIACX, STOOLCX  Urine Culture   Date Value Ref Range Status   11/14/2021 >100,000 CFU/mL Gram Negative Bacilli (A)  Preliminary     No results found for: VIRALCULTU, WOUNDCX    Radiology Data:   Imaging Results (Last 24 Hours)      Procedure Component Value Units Date/Time    CT Angiogram Chest [[id number]] Collected: 11/15/21 2030     Updated: 11/15/21 2037    Narrative:      EXAM: CT ANGIOGRAM CHEST-     INDICATION: Chest pain, nonspecific; I27-Kegoqz-qjwjsgqxetem nephritis,  not specified as acute or chronic     COMPARISON: None available.     DOSE LENGTH PRODUCT: 212 mGy cm. Automated exposure control was also  utilized to decrease patient radiation dose.     FINDINGS:     3-D MIP images were obtained. No evidence of pulmonary embolus. Main  pulmonary artery is nondilated. Thoracic aorta is nonaneurysmal. No  evidence of aortic dissection. Trace pericardial fluid.     Central airways are clear. Small pleural effusions with overlying  atelectasis. No pneumothorax or consolidative process. Anatomic variant  azygous lobe. No suspicious pulmonary nodule. No enlarged thoracic lymph  nodes.     Uniform thyroid. No acute chest wall soft tissue abnormality. Limited  view the upper abdomen demonstrates no new findings. No acute osseous  finding.       Impression:         1.  No evidence of pulmonary embolus.  2.  Small bilateral pleural effusions with overlying atelectasis.  This report was finalized on 11/15/2021 20:34 by Dr. Philip Nunez MD.          I have reviewed the patient's current medications.     Assessment/Plan     Active Hospital Problems    Diagnosis    • Pyelonephritis    • Transaminitis    • Constipation    • Hypothyroid    • Essential hypertension    • Hyperlipidemia    • Nephrolithiasis      Added automatically from request for surgery [id number]       Plan:  1.  Patient presented on 11/14 with 2-day history of fever and malaise.  CT abdomen pelvis showed acute pyelonephritis.  Febrile 103.2.  He has a history of E. coli urinary tract infection in 2019 resistant to ampicillin.     2.  Urine culture shows greater than 100 K gram-negative bacilli.  Zosyn discontinued on 11/15 in favor of meropenem due to febrile episode.  Blood  cultures with no growth to date.  Leukocytosis has resolved.  Procalcitonin 0.34.     3.  Patient has a history of epilepsy.  States the has been off of antiepileptics for over 30 years.  Last known seizure over 30 years ago.  Concern for possible seizure-like activity on admission.  EEG - results pending at this time.    4.  CT angiogram chest negative for pulmonary embolus.  Small bilateral pleural effusions with overlying atelectasis.     5.  Continue bowel regimen.     6.  Sequential compression devices for DVT prophylaxis.     7.  Home medications reviewed and resumed as appropriate.     8.  Transaminitis improving.   (200 yesterday) and AST 60 (282 yesterday).  Total bilirubin normal 0.3.     Discharge Planning: I expect the patient to be discharged to home in 1-2 days.       Electronically signed by BRITNEY Mandel, 11/16/21, 08:42 CST.

## 2021-11-16 NOTE — PLAN OF CARE
Goal Outcome Evaluation:  Plan of Care Reviewed With: patient, father, mother           Outcome Summary: Patient c/o h/a today. No other complaints. Po tylenol given. IID, IV abx, voiding, tolerating regular diet. Afebrile today. Safety maintained

## 2021-11-16 NOTE — PLAN OF CARE
Goal Outcome Evaluation:  Plan of Care Reviewed With: patient        Progress: no change  Outcome Summary: Patient c/o chest pain/ left upper back pain. PRN pain meds given. IVF and IV abx. CTA of chest obtained. Mild fever throughout the night. Patient requested IV fluids be turned down. No distress noted at this time. VSS. Safety maintained.

## 2021-11-16 NOTE — PROGRESS NOTES
"Pharmacy Dosing Service  Antimicrobial Dosing  Merrem    Assessment/Action/Plan:  Based on indication and renal function, Merrem 1 gm IV every 8 hours. Pharmacy will continue to monitor daily and make further adjustment(s) accordingly.     Subjective:  Frederick Simms is a 56 y.o. male with a  \"Pharmacy to Dose Merrem\" consult for the treatment of UTI , day 1 of 7 of treatment.    Objective:  Ht: 175.3 cm (69\"); Wt: 77.1 kg (170 lb)  Estimated Creatinine Clearance: 100.1 mL/min (by C-G formula based on SCr of 0.92 mg/dL).   Creatinine   Date Value Ref Range Status   11/15/2021 0.92 0.76 - 1.27 mg/dL Final   11/14/2021 0.90 0.76 - 1.27 mg/dL Final   07/26/2021 1.06 0.76 - 1.27 mg/dL Final   06/14/2018 1.2 0.5 - 1.2 mg/dL Final      Lab Results   Component Value Date    WBC 9.32 11/15/2021    WBC 13.02 (H) 11/14/2021    WBC 4.87 07/26/2021      Baseline culture results:  Microbiology Results (last 10 days)       Procedure Component Value - Date/Time    Blood Culture - Blood, Arm, Left [[id number]]  (Normal) Collected: 11/14/21 1714    Lab Status: Preliminary result Specimen: Blood from Arm, Left Updated: 11/15/21 1732     Blood Culture No growth at 24 hours    Urine Culture - Urine, Urine, Clean Catch [[id number]]  (Abnormal) Collected: 11/14/21 1523    Lab Status: Preliminary result Specimen: Urine, Clean Catch Updated: 11/15/21 0836     Urine Culture >100,000 CFU/mL Gram Negative Bacilli    COVID PRE-OP / PRE-PROCEDURE SCREENING ORDER (NO ISOLATION) - Swab, Nasopharynx [[id number]]  (Normal) Collected: 11/14/21 1515    Lab Status: Final result Specimen: Swab from Nasopharynx Updated: 11/14/21 1631    Narrative:      The following orders were created for panel order COVID PRE-OP / PRE-PROCEDURE SCREENING ORDER (NO ISOLATION) - Swab, Nasopharynx.  Procedure                               Abnormality         Status                     ---------                               -----------         ------                   "   Respiratory Panel PCR w/...[[id number]]  Normal              Final result                 Please view results for these tests on the individual orders.    Blood Culture - Blood, Arm, Right [[id number]]  (Normal) Collected: 11/14/21 1515    Lab Status: Preliminary result Specimen: Blood from Arm, Right Updated: 11/15/21 1545     Blood Culture No growth at 24 hours    Rapid Strep A Screen - Swab, Throat [[id number]]  (Normal) Collected: 11/14/21 1515    Lab Status: Final result Specimen: Swab from Throat Updated: 11/14/21 1557     Strep A Ag Negative    Respiratory Panel PCR w/COVID-19(SARS-CoV-2) [facility] In-House, NP Swab in UTM/VTM, 3-4 HR TAT - Swab, Nasopharynx [[id number]]  (Normal) Collected: 11/14/21 1515    Lab Status: Final result Specimen: Swab from Nasopharynx Updated: 11/14/21 1631     ADENOVIRUS, PCR Not Detected     Coronavirus 229E Not Detected     Coronavirus HKU1 Not Detected     Coronavirus NL63 Not Detected     Coronavirus OC43 Not Detected     COVID19 Not Detected     Human Metapneumovirus Not Detected     Human Rhinovirus/Enterovirus Not Detected     Influenza A PCR Not Detected     Influenza B PCR Not Detected     Parainfluenza Virus 1 Not Detected     Parainfluenza Virus 2 Not Detected     Parainfluenza Virus 3 Not Detected     Parainfluenza Virus 4 Not Detected     RSV, PCR Not Detected     Bordetella pertussis pcr Not Detected     Bordetella parapertussis PCR Not Detected     Chlamydophila pneumoniae PCR Not Detected     Mycoplasma pneumo by PCR Not Detected    Narrative:      In the setting of a positive respiratory panel with a viral infection PLUS a negative procalcitonin without other underlying concern for bacterial infection, consider observing off antibiotics or discontinuation of antibiotics and continue supportive care. If the respiratory panel is positive for atypical bacterial infection (Bordetella pertussis, Chlamydophila pneumoniae, or Mycoplasma pneumoniae),  consider antibiotic de-escalation to target atypical bacterial infection.    Beta Strep Culture, Throat - Swab, Throat [[id number]]  (Normal) Collected: 11/14/21 1515    Lab Status: Preliminary result Specimen: Swab from Throat Updated: 11/15/21 1237     Throat Culture, Beta Strep No Beta Hemolytic Streptococcus Isolated    Narrative:      Group A Strep incidence is low in adults. Positive culture for Beta hemolytic Streptococcus species can reflect colonization and not true infection. Please correlate clinically.            Jo Ann Carter, PharmD  11/15/21 18:04 CST

## 2021-11-17 VITALS
WEIGHT: 170 LBS | RESPIRATION RATE: 16 BRPM | DIASTOLIC BLOOD PRESSURE: 70 MMHG | HEIGHT: 69 IN | HEART RATE: 93 BPM | SYSTOLIC BLOOD PRESSURE: 112 MMHG | BODY MASS INDEX: 25.18 KG/M2 | OXYGEN SATURATION: 92 % | TEMPERATURE: 96.2 F

## 2021-11-17 RX ORDER — CEFDINIR 300 MG/1
300 CAPSULE ORAL 2 TIMES DAILY
Qty: 22 CAPSULE | Refills: 0 | Status: SHIPPED | OUTPATIENT
Start: 2021-11-17 | End: 2021-11-28

## 2021-11-17 RX ORDER — AMOXICILLIN 250 MG
1 CAPSULE ORAL 2 TIMES DAILY
Qty: 60 TABLET | Refills: 0 | Status: SHIPPED | OUTPATIENT
Start: 2021-11-17 | End: 2021-12-17

## 2021-11-17 RX ORDER — POLYETHYLENE GLYCOL 3350 17 G/17G
17 POWDER, FOR SOLUTION ORAL DAILY
Qty: 30 PACKET | Refills: 0 | Status: SHIPPED | OUTPATIENT
Start: 2021-11-18 | End: 2021-12-18

## 2021-11-17 RX ADMIN — PANTOPRAZOLE SODIUM 40 MG: 40 TABLET, DELAYED RELEASE ORAL at 06:23

## 2021-11-17 RX ADMIN — METOPROLOL SUCCINATE 50 MG: 50 TABLET, FILM COATED, EXTENDED RELEASE ORAL at 09:20

## 2021-11-17 RX ADMIN — LEVOTHYROXINE SODIUM 75 MCG: 75 TABLET ORAL at 06:23

## 2021-11-17 RX ADMIN — ASPIRIN 81 MG: 81 TABLET, COATED ORAL at 09:23

## 2021-11-17 RX ADMIN — POLYETHYLENE GLYCOL 3350 17 G: 17 POWDER, FOR SOLUTION ORAL at 09:22

## 2021-11-17 RX ADMIN — SODIUM CHLORIDE, PRESERVATIVE FREE 10 ML: 5 INJECTION INTRAVENOUS at 09:23

## 2021-11-17 RX ADMIN — DOCUSATE SODIUM 50 MG AND SENNOSIDES 8.6 MG 1 TABLET: 8.6; 5 TABLET, FILM COATED ORAL at 09:21

## 2021-11-17 NOTE — DISCHARGE SUMMARY
[facility] Medicine Services  DISCHARGE SUMMARY       Date of Admission: 11/14/2021  Date of Discharge:  11/17/2021  Primary Care Physician: Edgar Gomez Jr., MD    Presenting Problem/History of Present Illness:  Pyelonephritis [N12]     Final Discharge Diagnoses:  Active Hospital Problems    Diagnosis    • **Pyelonephritis    • Transaminitis    • Constipation    • Hypothyroid    • Essential hypertension    • Hyperlipidemia    • Nephrolithiasis      Added automatically from request for surgery [id number]         Consults: None.    Procedures Performed: None.    Pertinent Test Results:      Imaging Results (Last 72 Hours)     Procedure Component Value Units Date/Time    CT Angiogram Chest [[id number]] Collected: 11/15/21 2030     Updated: 11/15/21 2037    Narrative:      EXAM: CT ANGIOGRAM CHEST-     INDICATION: Chest pain, nonspecific; M32-[id number]-ziwplcgihnlu nephritis,  not specified as acute or chronic     COMPARISON: None available.     DOSE LENGTH PRODUCT: 212 mGy cm. Automated exposure control was also  utilized to decrease patient radiation dose.     FINDINGS:     3-D MIP images were obtained. No evidence of pulmonary embolus. Main  pulmonary artery is nondilated. Thoracic aorta is nonaneurysmal. No  evidence of aortic dissection. Trace pericardial fluid.     Central airways are clear. Small pleural effusions with overlying  atelectasis. No pneumothorax or consolidative process. Anatomic variant  azygous lobe. No suspicious pulmonary nodule. No enlarged thoracic lymph  nodes.     Uniform thyroid. No acute chest wall soft tissue abnormality. Limited  view the upper abdomen demonstrates no new findings. No acute osseous  finding.       Impression:         1.  No evidence of pulmonary embolus.  2.  Small bilateral pleural effusions with overlying atelectasis.  This report was finalized on 11/15/2021 20:34 by Dr. Philip Nunez MD.    CT Abdomen Pelvis With Contrast  [[id number]] Collected: 11/14/21 1659     Updated: 11/14/21 1708    Narrative:      EXAMINATION: CT ABDOMEN PELVIS W CONTRAST- 11/14/2021 4:59 PM CST     HISTORY: Abdominal pain, acute, nonlocalized     DOSE: 230 mGycm (Automatic exposure control technique was implemented in  an effort to keep the radiation dose as low as possible without  compromising image quality)     REPORT: Spiral CT of the abdomen and pelvis was performed after  administration of intravenous contrast from the lung bases through the  pubic symphysis. Reconstructed coronal and sagittal images are also  reviewed.     COMPARISON: CT abdomen pelvis with contrast 5/25/2021.     Review of lung windows demonstrates normal aeration of the lung bases.  Cholecystectomy clips are present. There is a small subcentimeter focus  of decreased attenuation within the liver dome axial images 11 and 12,  stable, small hemangioma is favored. The stomach is decompressed. The  spleen is homogeneous, normal in size. There is dilation of the common  bile duct to 10 mm, most likely related to a reservoir effect. The  pancreas and adrenal glands are within normal limits. There is patchy  enhancement of both kidneys, greater on the left, suspicious for  pyelonephritis. No renal abscess is seen. There is a dominant cyst at  the posterior cortex and inferior pole that is stable and measures  approximately 4.9 cm. Both renal arteries appear patent. The renal veins  are patent. There is no hydronephrosis. The ureters are decompressed.  Moderate stool volume is seen in the colon. The bladder appears within  normal limits. No free fluid or free air is seen. There is mild fluid  retention within pelvic small bowel loops, without associated wall  thickening. The appendix is normal. Review of bone windows is  unremarkable.       Impression:      1. Patchy enhancement of both kidneys compatible with apparent acute  pyelonephritis, no renal abscess is identified. There is a  dominant  benign appearing cyst at the inferior posterior left kidney measures 4.9  cm.  2. Evidence of previous cholecystectomy with a reservoir effect of the  extrahepatic bile ducts.  3. Small subcentimeter suspected hemangioma within the liver dome.  4. Based on the volume of stool within the colon, constipation is  likely. No bowel obstruction.  5. Retention of fluid within pelvic small bowel loops is nonspecific,  without wall thickening. Normal appendix.        This report was finalized on 11/14/2021 17:04 by Dr. Bruce Workman MD.    XR Chest 1 View [[id number]] Collected: 11/14/21 1519     Updated: 11/14/21 1523    Narrative:      EXAMINATION: XR CHEST 1 VW- 11/14/2021 3:19 PM CST     HISTORY: fever, tachycardia.     REPORT: A frontal view of the chest was obtained.     COMPARISON: Chest x-ray 11/27/2017.        The lungs are mildly hypoaerated, no focal infiltrate or pulmonary  consolidation is identified. No pneumothorax or effusion is identified.  Heart size is normal. The osseous structures show no acute findings.       Impression:      Shallow inspiration, no acute cardiopulmonary abnormality.     This report was finalized on 11/14/2021 15:20 by Dr. Bruce Workman MD.        Lab Results (last 72 hours)     Procedure Component Value Units Date/Time    Blood Culture - Blood, Arm, Left [[id number]]  (Normal) Collected: 11/14/21 1714    Specimen: Blood from Arm, Left Updated: 11/16/21 1732     Blood Culture No growth at 2 days    Blood Culture - Blood, Arm, Right [[id number]]  (Normal) Collected: 11/14/21 1515    Specimen: Blood from Arm, Right Updated: 11/16/21 1546     Blood Culture No growth at 2 days    Beta Strep Culture, Throat - Swab, Throat [[id number]]  (Normal) Collected: 11/14/21 1515    Specimen: Swab from Throat Updated: 11/16/21 1224     Throat Culture, Beta Strep No Beta Hemolytic Streptococcus Isolated    Narrative:      Group A Strep incidence is low in adults. Positive culture for Beta  hemolytic Streptococcus species can reflect colonization and not true infection. Please correlate clinically.    Urine Culture - Urine, Urine, Clean Catch [[id number]]  (Abnormal)  (Susceptibility) Collected: 11/14/21 1523    Specimen: Urine, Clean Catch Updated: 11/16/21 1113     Urine Culture >100,000 CFU/mL Escherichia coli    Susceptibility      Escherichia coli     JANICE     Ampicillin Susceptible     Ampicillin + Sulbactam Susceptible     Cefazolin Susceptible     Cefepime Susceptible     Ceftazidime Susceptible     Ceftriaxone Susceptible     Gentamicin Susceptible     Levofloxacin Susceptible     Nitrofurantoin Susceptible     Piperacillin + Tazobactam Susceptible     Tetracycline Susceptible     Trimethoprim + Sulfamethoxazole Susceptible                  Linear View                   Comprehensive Metabolic Panel [[id number]]  (Abnormal) Collected: 11/16/21 0511    Specimen: Blood Updated: 11/16/21 0647     Glucose 110 mg/dL      BUN 10 mg/dL      Creatinine 0.71 mg/dL      Sodium 134 mmol/L      Potassium 4.0 mmol/L      Chloride 98 mmol/L      CO2 24.0 mmol/L      Calcium 8.4 mg/dL      Total Protein 5.1 g/dL      Albumin 3.20 g/dL      ALT (SGPT) 107 U/L      AST (SGOT) 60 U/L      Alkaline Phosphatase 103 U/L      Total Bilirubin 0.3 mg/dL      eGFR Non African Amer 116 mL/min/1.73      Globulin 1.9 gm/dL      A/G Ratio 1.7 g/dL      BUN/Creatinine Ratio 14.1     Anion Gap 12.0 mmol/L     Narrative:      GFR Normal >60  Chronic Kidney Disease <60  Kidney Failure <15      CK [[id number]]  (Normal) Collected: 11/15/21 1807    Specimen: Blood Updated: 11/15/21 1836     Creatine Kinase 126 U/L     Comprehensive Metabolic Panel [[id number]]  (Abnormal) Collected: 11/15/21 0422    Specimen: Blood Updated: 11/15/21 0513     Glucose 111 mg/dL      BUN 13 mg/dL      Creatinine 0.92 mg/dL      Sodium 138 mmol/L      Potassium 4.2 mmol/L      Chloride 101 mmol/L      CO2 26.0 mmol/L      Calcium 9.0 mg/dL      Total  Protein 6.0 g/dL      Albumin 3.50 g/dL      ALT (SGPT) 200 U/L      AST (SGOT) 282 U/L      Alkaline Phosphatase 122 U/L      Total Bilirubin 0.6 mg/dL      eGFR Non African Amer 86 mL/min/1.73      Globulin 2.5 gm/dL      A/G Ratio 1.4 g/dL      BUN/Creatinine Ratio 14.1     Anion Gap 11.0 mmol/L     Narrative:      GFR Normal >60  Chronic Kidney Disease <60  Kidney Failure <15      Manual Differential [[id number]]  (Abnormal) Collected: 11/15/21 0422    Specimen: Blood Updated: 11/15/21 0507     Neutrophil % 87.0 %      Lymphocyte % 2.0 %      Monocyte % 11.0 %      Neutrophils Absolute 8.11 10*3/mm3      Lymphocytes Absolute 0.19 10*3/mm3      Monocytes Absolute 1.03 10*3/mm3      Polychromasia Slight/1+     Vacuolated Neutrophils Large/3+     Clumped Platelets Present     Giant Platelets Mod/2+    CBC & Differential [[id number]]  (Abnormal) Collected: 11/15/21 0422    Specimen: Blood Updated: 11/15/21 0439    Narrative:      The following orders were created for panel order CBC & Differential.  Procedure                               Abnormality         Status                     ---------                               -----------         ------                     CBC Auto Differential[[id number]]        Abnormal            Final result                 Please view results for these tests on the individual orders.    CBC Auto Differential [[id number]]  (Abnormal) Collected: 11/15/21 0422    Specimen: Blood Updated: 11/15/21 0439     WBC 9.32 10*3/mm3      RBC 4.33 10*6/mm3      Hemoglobin 12.7 g/dL      Hematocrit 37.8 %      MCV 87.3 fL      MCH 29.3 pg      MCHC 33.6 g/dL      RDW 11.9 %      RDW-SD 38.3 fl      MPV 9.4 fL      Platelets 180 10*3/mm3      nRBC 0.0 /100 WBC     COVID PRE-OP / PRE-PROCEDURE SCREENING ORDER (NO ISOLATION) - Swab, Nasopharynx [[id number]]  (Normal) Collected: 11/14/21 1515    Specimen: Swab from Nasopharynx Updated: 11/14/21 1631    Narrative:      The following orders were created  for panel order COVID PRE-OP / PRE-PROCEDURE SCREENING ORDER (NO ISOLATION) - Swab, Nasopharynx.  Procedure                               Abnormality         Status                     ---------                               -----------         ------                     Respiratory Panel PCR w/...[[id number]]  Normal              Final result                 Please view results for these tests on the individual orders.    Respiratory Panel PCR w/COVID-19(SARS-CoV-2) [facility]/JOHN/[facility]NY In-House, NP Swab in UTM/VTM, 3-4 HR TAT - Swab, Nasopharynx [[id number]]  (Normal) Collected: 11/14/21 1515    Specimen: Swab from Nasopharynx Updated: 11/14/21 1631     ADENOVIRUS, PCR Not Detected     Coronavirus 229E Not Detected     Coronavirus HKU1 Not Detected     Coronavirus NL63 Not Detected     Coronavirus OC43 Not Detected     COVID19 Not Detected     Human Metapneumovirus Not Detected     Human Rhinovirus/Enterovirus Not Detected     Influenza A PCR Not Detected     Influenza B PCR Not Detected     Parainfluenza Virus 1 Not Detected     Parainfluenza Virus 2 Not Detected     Parainfluenza Virus 3 Not Detected     Parainfluenza Virus 4 Not Detected     RSV, PCR Not Detected     Bordetella pertussis pcr Not Detected     Bordetella parapertussis PCR Not Detected     Chlamydophila pneumoniae PCR Not Detected     Mycoplasma pneumo by PCR Not Detected    Narrative:      In the setting of a positive respiratory panel with a viral infection PLUS a negative procalcitonin without other underlying concern for bacterial infection, consider observing off antibiotics or discontinuation of antibiotics and continue supportive care. If the respiratory panel is positive for atypical bacterial infection (Bordetella pertussis, Chlamydophila pneumoniae, or Mycoplasma pneumoniae), consider antibiotic de-escalation to target atypical bacterial infection.    Rapid Strep A Screen - Swab, Throat [[id number]]  (Normal) Collected: 11/14/21 0995  "   Specimen: Swab from Throat Updated: 11/14/21 1557     Strep A Ag Negative    Procalcitonin [[id number]]  (Abnormal) Collected: 11/14/21 1515    Specimen: Blood Updated: 11/14/21 1554     Procalcitonin 0.34 ng/mL     Narrative:      As a Marker for Sepsis (Non-Neonates):     1. <0.5 ng/mL represents a low risk of severe sepsis and/or septic shock.  2. >2 ng/mL represents a high risk of severe sepsis and/or septic shock.    As a Marker for Lower Respiratory Tract Infections that require antibiotic therapy:  PCT on Admission     Antibiotic Therapy             6-12 Hrs later  >0.5                          Strongly Recommended            >0.25 - <0.5             Recommended  0.1 - 0.25                  Discouraged                       Remeasure/reassess PCT  <0.1                         Strongly Discouraged         Remeasure/reassess PCT      As 28 day mortality risk marker: \"Change in Procalcitonin Result\" (>80% or <=80%) if Day 0 (or Day 1) and Day 4 values are available. Refer to http://www.[employer]-pct-calculator.com/    Change in PCT <=80 %   A decrease of PCT levels below or equal to 80% defines a positive change in PCT test result representing a higher risk for 28-day all-cause mortality of patients diagnosed with severe sepsis or septic shock.    Change in PCT >80 %   A decrease of PCT levels of more than 80% defines a negative change in PCT result representing a lower risk for 28-day all-cause mortality of patients diagnosed with severe sepsis or septic shock.                Urinalysis With Culture If Indicated - Urine, Clean Catch [[id number]]  (Abnormal) Collected: 11/14/21 1523    Specimen: Urine, Clean Catch Updated: 11/14/21 1551     Color, UA Yellow     Appearance, UA Cloudy     pH, UA 6.0     Specific Gravity, UA 1.018     Glucose, UA Negative     Ketones, UA Negative     Bilirubin, UA Negative     Blood, UA Moderate (2+)     Protein,  mg/dL (2+)     Leuk Esterase, UA Moderate (2+)     Nitrite, UA " Positive     Urobilinogen, UA 0.2 E.U./dL    Urinalysis, Microscopic Only - Urine, Clean Catch [[id number]]  (Abnormal) Collected: 11/14/21 1523    Specimen: Urine, Clean Catch Updated: 11/14/21 1551     RBC, UA 3-5 /HPF      WBC, UA Too Numerous to Count /HPF      Bacteria, UA 4+ /HPF      Squamous Epithelial Cells, UA 0-2 /HPF      Hyaline Casts, UA 0-2 /LPF      Methodology Automated Microscopy    Comprehensive Metabolic Panel [[id number]]  (Abnormal) Collected: 11/14/21 1515    Specimen: Blood Updated: 11/14/21 1549     Glucose 123 mg/dL      BUN 15 mg/dL      Creatinine 0.90 mg/dL      Sodium 136 mmol/L      Potassium 4.5 mmol/L      Chloride 98 mmol/L      CO2 27.0 mmol/L      Calcium 9.5 mg/dL      Total Protein 7.3 g/dL      Albumin 4.00 g/dL      ALT (SGPT) 54 U/L      AST (SGOT) 46 U/L      Alkaline Phosphatase 72 U/L      Total Bilirubin 0.4 mg/dL      eGFR Non African Amer 88 mL/min/1.73      Globulin 3.3 gm/dL      A/G Ratio 1.2 g/dL      BUN/Creatinine Ratio 16.7     Anion Gap 11.0 mmol/L     Narrative:      GFR Normal >60  Chronic Kidney Disease <60  Kidney Failure <15      Lactic Acid, Plasma [[id number]]  (Normal) Collected: 11/14/21 1515    Specimen: Blood Updated: 11/14/21 1546     Lactate 1.7 mmol/L     Lipase [[id number]]  (Normal) Collected: 11/14/21 1515    Specimen: Blood Updated: 11/14/21 1544     Lipase 23 U/L     Protime-INR [[id number]]  (Abnormal) Collected: 11/14/21 1515    Specimen: Blood Updated: 11/14/21 1537     Protime 14.1 Seconds      INR 1.13    CBC & Differential [[id number]]  (Abnormal) Collected: 11/14/21 1515    Specimen: Blood Updated: 11/14/21 1529    Narrative:      The following orders were created for panel order CBC & Differential.  Procedure                               Abnormality         Status                     ---------                               -----------         ------                     CBC Auto Differential[[id number]]        Abnormal            Final result                  Please view results for these tests on the individual orders.    CBC Auto Differential [[id number]]  (Abnormal) Collected: 11/14/21 1515    Specimen: Blood Updated: 11/14/21 1529     WBC 13.02 10*3/mm3      RBC 4.84 10*6/mm3      Hemoglobin 14.4 g/dL      Hematocrit 42.5 %      MCV 87.8 fL      MCH 29.8 pg      MCHC 33.9 g/dL      RDW 11.9 %      RDW-SD 38.5 fl      MPV 9.3 fL      Platelets 227 10*3/mm3      Neutrophil % 85.1 %      Lymphocyte % 6.2 %      Monocyte % 8.0 %      Eosinophil % 0.0 %      Basophil % 0.2 %      Immature Grans % 0.5 %      Neutrophils, Absolute 11.08 10*3/mm3      Lymphocytes, Absolute 0.81 10*3/mm3      Monocytes, Absolute 1.04 10*3/mm3      Eosinophils, Absolute 0.00 10*3/mm3      Basophils, Absolute 0.03 10*3/mm3      Immature Grans, Absolute 0.06 10*3/mm3      nRBC 0.0 /100 WBC         Chief Complaint on Day of Discharge: Overall, patient reports feeling better as compared to admission.  Denies shortness of breath, chest pain or pressure.  He is tolerating a diet.  Denies any acute complaints.    Hospital Course:  The patient is a 56 y.o. male who presented to [facility] with 2-day history of fever and malaise.  He has a past medical history significant for kidney stones, gastroesophageal reflux disease, and hypothyroidism.  He follows with Dr. Gomez for his primary care.  He presented with 2-day history of fever and malaise.  He also noted malodorous urine that was much more dark and cloudy in nature.  He does follow with Dr. Muñoz of urology given history of kidney stones.  CT abdomen pelvis showed acute pyelonephritis.  Febrile 103.2.  He has a history of E. coli urinary tract infection in 2019 resistant to ampicillin.  He was started on IV Zosyn.  He was admitted to the hospitalist service for further evaluation and management.    Zosyn discontinued on 11/15 in favor of meropenem due to febrile episode.  Urine culture shows E. coli pan susceptible.   "Merrem was changed to IV ceftriaxone.  Blood cultures with no growth to date.  Leukocytosis has resolved.  He has remained afebrile for over 24 hours.  We will go ahead and transition ceftriaxone to Omnicef to complete a total of 14 days antibiotic therapy. States the has been off of antiepileptics for over 30 years.  Last known seizure over 30 years ago.  Concern for possible seizure-like activity on admission.  EEG read as normal awake EEG.  CT angiogram chest negative for pulmonary embolus.  Small bilateral pleural effusions with overlying atelectasis. Transaminitis improving.   (200 yesterday) and AST 60 (282 yesterday).  Total bilirubin normal 0.3.  Recommend to repeat CMP on outpatient basis.      Overall, patient reports feeling better as compared to admission.  Denies shortness of breath, chest pain or pressure.  He is tolerating a diet.  He is medically stable and appropriate for discharge home today.  Discussed plan of care with mother at bedside.  She is agreeable with plan.  He is to follow-up with Dr. Gomez in 1 week.    Condition on Discharge:  Medically stable.    Physical Exam on Discharge:  /70 (BP Location: Left arm, Patient Position: Lying)   Pulse 93   Temp 96.2 °F (35.7 °C) (Axillary)   Resp 16   Ht 175.3 cm (69\")   Wt 77.1 kg (170 lb)   SpO2 92%   BMI 25.10 kg/m²   Physical Exam  Vitals reviewed.   Constitutional:       General: He is not in acute distress.     Appearance: He is not toxic-appearing.      Comments: Sitting up in bed.  Mother at bedside.  No acute distress.  Tolerating room air.    HENT:      Head: Normocephalic and atraumatic.      Mouth/Throat:      Mouth: Mucous membranes are moist.      Pharynx: Oropharynx is clear.   Eyes:      Extraocular Movements: Extraocular movements intact.      Conjunctiva/sclera: Conjunctivae normal.      Pupils: Pupils are equal, round, and reactive to light.   Cardiovascular:      Rate and Rhythm: Normal rate and regular rhythm. "      Pulses: Normal pulses.      Heart sounds: No murmur heard.  Pulmonary:      Effort: Pulmonary effort is normal. No respiratory distress.      Breath sounds: Normal breath sounds. No wheezing or rhonchi.   Abdominal:      General: Bowel sounds are normal. There is no distension.      Palpations: Abdomen is soft.      Tenderness: There is no abdominal tenderness.   Musculoskeletal:         General: No swelling or tenderness. Normal range of motion.      Cervical back: Normal range of motion and neck supple. No muscular tenderness.   Skin:     General: Skin is warm and dry.      Findings: No erythema or rash.   Neurological:      General: No focal deficit present.      Mental Status: He is alert and oriented to person, place, and time.      Cranial Nerves: No cranial nerve deficit.      Motor: No weakness.   Psychiatric:         Mood and Affect: Mood normal.         Behavior: Behavior normal.    Discharge Disposition:  Home or Self Care    Discharge Medications:     Discharge Medications      New Medications      Instructions Start Date   cefdinir 300 MG capsule  Commonly known as: OMNICEF   300 mg, Oral, 2 Times Daily      polyethylene glycol 17 g packet  Commonly known as: MIRALAX   17 g, Oral, Daily   Start Date: November 18, 2021     sennosides-docusate 8.6-50 MG per tablet  Commonly known as: PERICOLACE   1 tablet, Oral, 2 Times Daily         Continue These Medications      Instructions Start Date   amitriptyline 50 MG tablet  Commonly known as: ELAVIL   50 mg, Oral, Nightly      aspirin 81 MG EC tablet   81 mg, Oral, Daily      Euthyrox 75 MCG tablet  Generic drug: levothyroxine   75 mcg, Oral, Every Early Morning      fenofibrate 160 MG tablet   160 mg, Oral, Nightly      metoprolol succinate XL 50 MG 24 hr tablet  Commonly known as: TOPROL-XL   50 mg, Oral, Daily      OLANZapine 10 MG tablet  Commonly known as: zyPREXA   10 mg, Oral, Nightly      pantoprazole 40 MG EC tablet  Commonly known as: PROTONIX    40 mg, Oral, Nightly      tamsulosin 0.4 MG capsule 24 hr capsule  Commonly known as: FLOMAX   0.4 mg, Oral, Nightly             Discharge Diet:   Diet Instructions     Diet: Regular      Discharge Diet: Regular          Activity at Discharge:   Activity Instructions     Activity as Tolerated            Discharge Care Plan/Instructions:   1.  Follow-up with Dr. Gomez in 1 week.  2.  Continue Omnicef until completion.  3.  Seek evaluation for worsening symptoms.    Follow-up Appointments:   Future Appointments   Date Time Provider Department Center   2/17/2022  9:15 AM Manolo Wolfe PA [facility]       Test Results Pending at Discharge: Blood cultures with no growth to date, will follow to completion.    Electronically signed by BRITNEY Mandel, 11/17/21, 11:28 CST.    Time: 35 minutes.

## 2021-11-18 ENCOUNTER — READMISSION MANAGEMENT (OUTPATIENT)
Dept: CALL CENTER | Facility: HOSPITAL | Age: 56
End: 2021-11-18

## 2021-11-18 NOTE — OUTREACH NOTE
Prep Survey      Responses   [facility] patient discharged from? [place]   Is LACE score < 7 ? No   Emergency Room discharge w/ pulse ox? No   Eligibility Readm Mgmt   Discharge diagnosis Pyelonephritis   Does the patient have one of the following disease processes/diagnoses(primary or secondary)? Other   Does the patient have Home health ordered? No   Is there a DME ordered? No   Prep survey completed? Yes          Aimee Limon RN

## 2021-11-19 LAB
BACTERIA SPEC AEROBE CULT: NORMAL
BACTERIA SPEC AEROBE CULT: NORMAL

## 2021-11-22 ENCOUNTER — READMISSION MANAGEMENT (OUTPATIENT)
Dept: CALL CENTER | Facility: HOSPITAL | Age: 56
End: 2021-11-22

## 2021-11-22 NOTE — OUTREACH NOTE
Medical Week 1 Survey      Responses   [facility] patient discharged from? [place]   Does the patient have one of the following disease processes/diagnoses(primary or secondary)? Other   Week 1 attempt successful? Yes   Call start time 1154   Call end time 1200   Discharge diagnosis Pyelonephritis   Is patient permission given to speak with other caregiver? Yes   List who call center can speak with Cesia, mother   Person spoke with today (if not patient) and relationship Cesia   Meds reviewed with patient/caregiver? Yes   Is the patient having any side effects they believe may be caused by any medication additions or changes? No   Does the patient have all medications ordered at discharge? Yes   Is the patient taking all medications as directed (includes completed medication regime)? Yes   Does the patient have a primary care provider?  Yes   Does the patient have an appointment with their PCP within 7 days of discharge? Yes   Has the patient kept scheduled appointments due by today? N/A   Comments 11/24/2021 PCP   Has home health visited the patient within 72 hours of discharge? N/A   Psychosocial issues? No   Did the patient receive a copy of their discharge instructions? Yes   Nursing interventions Reviewed instructions with patient   What is the patient's perception of their health status since discharge? Improving   Is the patient/caregiver able to teach back signs and symptoms related to disease process for when to call PCP? Yes   Is the patient/caregiver able to teach back signs and symptoms related to disease process for when to call 911? Yes   Is the patient/caregiver able to teach back the hierarchy of who to call/visit for symptoms/problems? PCP, Specialist, Home health nurse, Urgent Care, ED, 911 Yes   Week 1 call completed? Yes          My Bynum RN

## 2021-11-28 ENCOUNTER — READMISSION MANAGEMENT (OUTPATIENT)
Dept: CALL CENTER | Facility: HOSPITAL | Age: 56
End: 2021-11-28

## 2021-11-28 NOTE — OUTREACH NOTE
Medical Week 2 Survey      Responses   [facility] patient discharged from? [place]   Does the patient have one of the following disease processes/diagnoses(primary or secondary)? Other   Week 2 attempt successful? Yes   Call start time 1627   Call end time 1629   Meds reviewed with patient/caregiver? Yes   Is the patient taking all medications as directed (includes completed medication regime)? Yes   Medication comments has taken all the antibiotic   Has the patient kept scheduled appointments due by today? Yes   Comments has seen PCP   Did the patient receive a copy of their discharge instructions? Yes   What is the patient's perception of their health status since discharge? Improving   Week 2 Call Completed? Yes   Wrap up additional comments Mother says he is feeling better          Rosie Ramos RN

## 2021-12-07 ENCOUNTER — READMISSION MANAGEMENT (OUTPATIENT)
Dept: CALL CENTER | Facility: HOSPITAL | Age: 56
End: 2021-12-07

## 2021-12-07 NOTE — OUTREACH NOTE
Prep Survey      Responses   [facility] patient discharged from? [place]   Does the patient have one of the following disease processes/diagnoses(primary or secondary)? Other          Sara Patel RN

## 2021-12-09 ENCOUNTER — READMISSION MANAGEMENT (OUTPATIENT)
Dept: CALL CENTER | Facility: HOSPITAL | Age: 56
End: 2021-12-09

## 2021-12-09 NOTE — OUTREACH NOTE
Medical Week 3 Survey      Responses   [facility] patient discharged from? [place]   Does the patient have one of the following disease processes/diagnoses(primary or secondary)? Other   Week 3 attempt successful? Yes   Call start time 1612   Call end time 1616   Discharge diagnosis Pyelonephritis   Person spoke with today (if not patient) and relationship Cesia Perez reviewed with patient/caregiver? Yes   Is the patient taking all medications as directed (includes completed medication regime)? Yes   Does the patient have a primary care provider?  Yes   Has the patient kept scheduled appointments due by today? Yes   Psychosocial issues? No   Did the patient receive a copy of their discharge instructions? Yes   Nursing interventions Reviewed instructions with patient   What is the patient's perception of their health status since discharge? New symptoms unrelated to diagnosis  [Pt still has some left side pain. And states he has for along. ]   Is the patient/caregiver able to teach back signs and symptoms related to disease process for when to call PCP? Yes   Is the patient/caregiver able to teach back signs and symptoms related to disease process for when to call 911? Yes   Is the patient/caregiver able to teach back the hierarchy of who to call/visit for symptoms/problems? PCP, Specialist, Home health nurse, Urgent Care, ED, 911 Yes   If the patient is a current smoker, are they able to teach back resources for cessation? Not a smoker   Week 3 Call Completed? Yes   Wrap up additional comments Encouraged mother to have Pt call PCP as he still has some left side pain. Mother MILLICENT.           Rylee Villanueva RN

## 2021-12-20 ENCOUNTER — READMISSION MANAGEMENT (OUTPATIENT)
Dept: CALL CENTER | Facility: HOSPITAL | Age: 56
End: 2021-12-20

## 2021-12-20 NOTE — OUTREACH NOTE
Medical Week 4 Survey      Responses   [facility] patient discharged from? [place]   Does the patient have one of the following disease processes/diagnoses(primary or secondary)? Other   Week 4 attempt successful? Yes   Call start time 1519   Call end time 1522   Person spoke with today (if not patient) and relationship Father   Meds reviewed with patient/caregiver? Yes   Is the patient taking all medications as directed (includes completed medication regime)? Yes   Has the patient kept scheduled appointments due by today? Yes   Is the patient still receiving Home Health Services? 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 He stays busy.   What is the patient's perception of their health status since discharge? Improving   Week 4 Call Completed? Yes   Would the patient like one additional call? No   Graduated Yes   Is the patient interested in additional calls from an ambulatory ?  NOTE:  applies to high risk patients requiring additional follow-up. No   Did the patient feel the follow up calls were helpful during their recovery period? Yes          My Bynum RN

## 2022-07-11 ENCOUNTER — APPOINTMENT (OUTPATIENT)
Dept: GENERAL RADIOLOGY | Facility: HOSPITAL | Age: 57
End: 2022-07-11

## 2022-07-11 ENCOUNTER — HOSPITAL ENCOUNTER (OUTPATIENT)
Dept: CT IMAGING | Facility: HOSPITAL | Age: 57
Discharge: HOME | End: 2022-07-11

## 2022-07-11 ENCOUNTER — HOSPITAL ENCOUNTER (EMERGENCY)
Facility: HOSPITAL | Age: 57
Discharge: HOME | End: 2022-07-11
Admitting: EMERGENCY MEDICINE

## 2022-07-11 ENCOUNTER — LAB (OUTPATIENT)
Dept: LAB | Facility: HOSPITAL | Age: 57
End: 2022-07-11

## 2022-07-11 ENCOUNTER — OFFICE VISIT (OUTPATIENT)
Dept: FAMILY MEDICINE CLINIC | Facility: CLINIC | Age: 57
End: 2022-07-11

## 2022-07-11 ENCOUNTER — APPOINTMENT (OUTPATIENT)
Dept: CARDIOLOGY | Facility: HOSPITAL | Age: 57
End: 2022-07-11

## 2022-07-11 VITALS
HEIGHT: 69 IN | SYSTOLIC BLOOD PRESSURE: 126 MMHG | HEART RATE: 72 BPM | TEMPERATURE: 98 F | RESPIRATION RATE: 16 BRPM | WEIGHT: 173.5 LBS | OXYGEN SATURATION: 99 % | BODY MASS INDEX: 25.7 KG/M2 | DIASTOLIC BLOOD PRESSURE: 80 MMHG

## 2022-07-11 VITALS
OXYGEN SATURATION: 98 % | BODY MASS INDEX: 25.68 KG/M2 | RESPIRATION RATE: 16 BRPM | WEIGHT: 173.4 LBS | HEART RATE: 71 BPM | TEMPERATURE: 97.1 F | SYSTOLIC BLOOD PRESSURE: 121 MMHG | HEIGHT: 69 IN | DIASTOLIC BLOOD PRESSURE: 81 MMHG

## 2022-07-11 DIAGNOSIS — R07.89 OTHER CHEST PAIN: ICD-10-CM

## 2022-07-11 DIAGNOSIS — R10.31 RIGHT LOWER QUADRANT ABDOMINAL PAIN: Primary | ICD-10-CM

## 2022-07-11 DIAGNOSIS — G89.29 CHRONIC CHEST PAIN: Primary | ICD-10-CM

## 2022-07-11 DIAGNOSIS — R10.31 RIGHT LOWER QUADRANT ABDOMINAL PAIN: ICD-10-CM

## 2022-07-11 DIAGNOSIS — R07.9 CHRONIC CHEST PAIN: Primary | ICD-10-CM

## 2022-07-11 LAB
ALBUMIN SERPL-MCNC: 4.3 G/DL (ref 3.5–5)
ALBUMIN SERPL-MCNC: 4.5 G/DL (ref 3.5–5.2)
ALBUMIN/GLOB SERPL: 1.5 G/DL (ref 1.1–2.5)
ALBUMIN/GLOB SERPL: 2 G/DL
ALP SERPL-CCNC: 52 U/L (ref 24–120)
ALP SERPL-CCNC: 53 U/L (ref 39–117)
ALT SERPL W P-5'-P-CCNC: 17 U/L (ref 1–41)
ALT SERPL W P-5'-P-CCNC: 20 U/L (ref 0–50)
AMYLASE SERPL-CCNC: 97 U/L (ref 30–110)
ANION GAP SERPL CALCULATED.3IONS-SCNC: -1 MMOL/L (ref 4–13)
ANION GAP SERPL CALCULATED.3IONS-SCNC: 9 MMOL/L (ref 5–15)
ARTERIAL PATENCY WRIST A: POSITIVE
AST SERPL-CCNC: 18 U/L (ref 1–40)
AST SERPL-CCNC: 22 U/L (ref 7–45)
ATMOSPHERIC PRESS: 747 MMHG
AUTO MIXED CELLS #: 0.7 10*3/MM3 (ref 0.1–2.6)
AUTO MIXED CELLS %: 9.9 % (ref 0.1–24)
BASE EXCESS BLDA CALC-SCNC: 2.1 MMOL/L (ref 0–2)
BASOPHILS # BLD AUTO: 0.03 10*3/MM3 (ref 0–0.2)
BASOPHILS NFR BLD AUTO: 0.5 % (ref 0–1.5)
BDY SITE: ABNORMAL
BH CV STRESS BP STAGE 1: NORMAL
BH CV STRESS BP STAGE 2: NORMAL
BH CV STRESS BP STAGE 3: NORMAL
BH CV STRESS DURATION MIN STAGE 1: 3
BH CV STRESS DURATION MIN STAGE 2: 3
BH CV STRESS DURATION MIN STAGE 3: 3
BH CV STRESS DURATION SEC STAGE 1: 0
BH CV STRESS DURATION SEC STAGE 2: 0
BH CV STRESS DURATION SEC STAGE 3: 0
BH CV STRESS ECHO POST STRESS EJECTION FRACTION EF: 65 %
BH CV STRESS GRADE STAGE 1: 10
BH CV STRESS GRADE STAGE 2: 12
BH CV STRESS GRADE STAGE 3: 14
BH CV STRESS HR STAGE 1: 96
BH CV STRESS HR STAGE 2: 116
BH CV STRESS HR STAGE 3: 144
BH CV STRESS METS STAGE 1: 5
BH CV STRESS METS STAGE 2: 7.5
BH CV STRESS METS STAGE 3: 10
BH CV STRESS PROTOCOL 1: NORMAL
BH CV STRESS RECOVERY BP: NORMAL MMHG
BH CV STRESS RECOVERY HR: 71 BPM
BH CV STRESS SPEED STAGE 1: 1.7
BH CV STRESS SPEED STAGE 2: 2.5
BH CV STRESS SPEED STAGE 3: 3.4
BH CV STRESS STAGE 1: 1
BH CV STRESS STAGE 2: 2
BH CV STRESS STAGE 3: 3
BILIRUB SERPL-MCNC: 0.2 MG/DL (ref 0.1–1)
BILIRUB SERPL-MCNC: 0.2 MG/DL (ref 0–1.2)
BODY TEMPERATURE: 37 C
BUN SERPL-MCNC: 12 MG/DL (ref 6–20)
BUN SERPL-MCNC: 14 MG/DL (ref 5–21)
BUN/CREAT SERPL: 13.2 (ref 7–25)
BUN/CREAT SERPL: 14.6
CALCIUM SPEC-SCNC: 9.4 MG/DL (ref 8.6–10.5)
CALCIUM SPEC-SCNC: 9.5 MG/DL (ref 8.4–10.4)
CHLORIDE SERPL-SCNC: 105 MMOL/L (ref 98–107)
CHLORIDE SERPL-SCNC: 106 MMOL/L (ref 98–110)
CO2 SERPL-SCNC: 28 MMOL/L (ref 22–29)
CO2 SERPL-SCNC: 33 MMOL/L (ref 24–31)
CREAT BLDA-MCNC: 0.9 MG/DL (ref 0.6–1.3)
CREAT SERPL-MCNC: 0.91 MG/DL (ref 0.76–1.27)
CREAT SERPL-MCNC: 0.96 MG/DL (ref 0.5–1.4)
CRP SERPL-MCNC: <0.3 MG/DL (ref 0–0.5)
D DIMER PPP FEU-MCNC: <0.27 MCGFEU/ML (ref 0–0.5)
DEPRECATED RDW RBC AUTO: 39.8 FL (ref 37–54)
EGFRCR SERPLBLD CKD-EPI 2021: 93.3 ML/MIN/1.73
EGFRCR SERPLBLD CKD-EPI 2021: 99.5 ML/MIN/1.73
EOSINOPHIL # BLD AUTO: 0.06 10*3/MM3 (ref 0–0.4)
EOSINOPHIL NFR BLD AUTO: 0.9 % (ref 0.3–6.2)
ERYTHROCYTE [DISTWIDTH] IN BLOOD BY AUTOMATED COUNT: 11.8 % (ref 12.3–15.4)
ERYTHROCYTE [DISTWIDTH] IN BLOOD BY AUTOMATED COUNT: 12 % (ref 12.3–15.4)
ERYTHROCYTE [SEDIMENTATION RATE] IN BLOOD: 3 MM/HR (ref 0–20)
GLOBULIN UR ELPH-MCNC: 2.2 GM/DL
GLOBULIN UR ELPH-MCNC: 2.9 GM/DL
GLUCOSE SERPL-MCNC: 109 MG/DL (ref 65–99)
GLUCOSE SERPL-MCNC: 93 MG/DL (ref 70–100)
HCO3 BLDA-SCNC: 22.6 MMOL/L (ref 20–26)
HCT VFR BLD AUTO: 43.5 % (ref 37.5–51)
HCT VFR BLD AUTO: 45 % (ref 37.5–51)
HGB BLD-MCNC: 14.7 G/DL (ref 13–17.7)
HGB BLD-MCNC: 14.8 G/DL (ref 13–17.7)
HOLD SPECIMEN: NORMAL
HOLD SPECIMEN: NORMAL
IMM GRANULOCYTES # BLD AUTO: 0.01 10*3/MM3 (ref 0–0.05)
IMM GRANULOCYTES NFR BLD AUTO: 0.2 % (ref 0–0.5)
LIPASE SERPL-CCNC: 31 U/L (ref 13–60)
LIPASE SERPL-CCNC: 69 U/L (ref 23–203)
LV EF 2D ECHO EST: 55 %
LYMPHOCYTES # BLD AUTO: 1.8 10*3/MM3 (ref 0.7–3.1)
LYMPHOCYTES # BLD AUTO: 1.96 10*3/MM3 (ref 0.7–3.1)
LYMPHOCYTES NFR BLD AUTO: 26.4 % (ref 19.6–45.3)
LYMPHOCYTES NFR BLD AUTO: 29.7 % (ref 19.6–45.3)
Lab: ABNORMAL
MAXIMAL PREDICTED HEART RATE: 165 BPM
MCH RBC QN AUTO: 29.8 PG (ref 26.6–33)
MCH RBC QN AUTO: 29.8 PG (ref 26.6–33)
MCHC RBC AUTO-ENTMCNC: 32.9 G/DL (ref 31.5–35.7)
MCHC RBC AUTO-ENTMCNC: 33.8 G/DL (ref 31.5–35.7)
MCV RBC AUTO: 88.1 FL (ref 79–97)
MCV RBC AUTO: 90.5 FL (ref 79–97)
MODALITY: ABNORMAL
MONOCYTES # BLD AUTO: 0.46 10*3/MM3 (ref 0.1–0.9)
MONOCYTES NFR BLD AUTO: 7 % (ref 5–12)
NEUTROPHILS NFR BLD AUTO: 4.08 10*3/MM3 (ref 1.7–7)
NEUTROPHILS NFR BLD AUTO: 4.4 10*3/MM3 (ref 1.7–7)
NEUTROPHILS NFR BLD AUTO: 61.7 % (ref 42.7–76)
NEUTROPHILS NFR BLD AUTO: 63.7 % (ref 42.7–76)
NRBC BLD AUTO-RTO: 0 /100 WBC (ref 0–0.2)
NT-PROBNP SERPL-MCNC: 15 PG/ML (ref 0–900)
PCO2 BLDA: 25.2 MM HG (ref 35–45)
PCO2 TEMP ADJ BLD: 25.2 MM HG (ref 35–45)
PERCENT MAX PREDICTED HR: 87.27 %
PH BLDA: 7.56 PH UNITS (ref 7.35–7.45)
PH, TEMP CORRECTED: 7.56 PH UNITS (ref 7.35–7.45)
PLATELET # BLD AUTO: 268 10*3/MM3 (ref 140–450)
PLATELET # BLD AUTO: 270 10*3/MM3 (ref 140–450)
PMV BLD AUTO: 8.6 FL (ref 6–12)
PMV BLD AUTO: 9.3 FL (ref 6–12)
PO2 BLDA: 107 MM HG (ref 83–108)
PO2 TEMP ADJ BLD: 107 MM HG (ref 83–108)
POTASSIUM SERPL-SCNC: 3.7 MMOL/L (ref 3.5–5.2)
POTASSIUM SERPL-SCNC: 4.2 MMOL/L (ref 3.5–5.3)
PROT SERPL-MCNC: 6.7 G/DL (ref 6–8.5)
PROT SERPL-MCNC: 7.2 G/DL (ref 6.3–8.7)
RBC # BLD AUTO: 4.94 10*6/MM3 (ref 4.14–5.8)
RBC # BLD AUTO: 4.97 10*6/MM3 (ref 4.14–5.8)
SAO2 % BLDCOA: 99.8 % (ref 94–99)
SODIUM SERPL-SCNC: 138 MMOL/L (ref 135–145)
SODIUM SERPL-SCNC: 142 MMOL/L (ref 136–145)
STRESS BASELINE BP: NORMAL MMHG
STRESS BASELINE HR: 64 BPM
STRESS PERCENT HR: 103 %
STRESS POST ESTIMATED WORKLOAD: 10 METS
STRESS POST EXERCISE DUR MIN: 9 MIN
STRESS POST EXERCISE DUR SEC: 0 SEC
STRESS POST PEAK BP: NORMAL MMHG
STRESS POST PEAK HR: 144 BPM
STRESS TARGET HR: 140 BPM
TROPONIN T SERPL-MCNC: <0.01 NG/ML (ref 0–0.03)
VENTILATOR MODE: ABNORMAL
WBC NRBC COR # BLD: 6.6 10*3/MM3 (ref 3.4–10.8)
WBC NRBC COR # BLD: 6.9 10*3/MM3 (ref 3.4–10.8)
WHOLE BLOOD HOLD COAG: NORMAL
WHOLE BLOOD HOLD SPECIMEN: NORMAL

## 2022-07-11 PROCEDURE — 99215 OFFICE O/P EST HI 40 MIN: CPT | Performed by: NURSE PRACTITIONER

## 2022-07-11 PROCEDURE — 82565 ASSAY OF CREATININE: CPT

## 2022-07-11 PROCEDURE — 85025 COMPLETE CBC W/AUTO DIFF WBC: CPT | Performed by: PHYSICIAN ASSISTANT

## 2022-07-11 PROCEDURE — 84484 ASSAY OF TROPONIN QUANT: CPT

## 2022-07-11 PROCEDURE — 85379 FIBRIN DEGRADATION QUANT: CPT | Performed by: PHYSICIAN ASSISTANT

## 2022-07-11 PROCEDURE — 93350 STRESS TTE ONLY: CPT | Performed by: INTERNAL MEDICINE

## 2022-07-11 PROCEDURE — 93010 ELECTROCARDIOGRAM REPORT: CPT | Performed by: INTERNAL MEDICINE

## 2022-07-11 PROCEDURE — 84484 ASSAY OF TROPONIN QUANT: CPT | Performed by: EMERGENCY MEDICINE

## 2022-07-11 PROCEDURE — 80053 COMPREHEN METABOLIC PANEL: CPT | Performed by: PHYSICIAN ASSISTANT

## 2022-07-11 PROCEDURE — 82803 BLOOD GASES ANY COMBINATION: CPT

## 2022-07-11 PROCEDURE — 82150 ASSAY OF AMYLASE: CPT

## 2022-07-11 PROCEDURE — 93017 CV STRESS TEST TRACING ONLY: CPT

## 2022-07-11 PROCEDURE — 93350 STRESS TTE ONLY: CPT

## 2022-07-11 PROCEDURE — 96374 THER/PROPH/DIAG INJ IV PUSH: CPT

## 2022-07-11 PROCEDURE — 93018 CV STRESS TEST I&R ONLY: CPT | Performed by: INTERNAL MEDICINE

## 2022-07-11 PROCEDURE — 85652 RBC SED RATE AUTOMATED: CPT

## 2022-07-11 PROCEDURE — 36415 COLL VENOUS BLD VENIPUNCTURE: CPT

## 2022-07-11 PROCEDURE — 83690 ASSAY OF LIPASE: CPT

## 2022-07-11 PROCEDURE — 99284 EMERGENCY DEPT VISIT MOD MDM: CPT

## 2022-07-11 PROCEDURE — 71045 X-RAY EXAM CHEST 1 VIEW: CPT

## 2022-07-11 PROCEDURE — 93005 ELECTROCARDIOGRAM TRACING: CPT | Performed by: PHYSICIAN ASSISTANT

## 2022-07-11 PROCEDURE — 93352 ADMIN ECG CONTRAST AGENT: CPT | Performed by: INTERNAL MEDICINE

## 2022-07-11 PROCEDURE — 86140 C-REACTIVE PROTEIN: CPT

## 2022-07-11 PROCEDURE — 25010000002 PERFLUTREN 6.52 MG/ML SUSPENSION: Performed by: INTERNAL MEDICINE

## 2022-07-11 PROCEDURE — 93005 ELECTROCARDIOGRAM TRACING: CPT | Performed by: EMERGENCY MEDICINE

## 2022-07-11 PROCEDURE — 83690 ASSAY OF LIPASE: CPT | Performed by: PHYSICIAN ASSISTANT

## 2022-07-11 PROCEDURE — 80053 COMPREHEN METABOLIC PANEL: CPT | Performed by: NURSE PRACTITIONER

## 2022-07-11 PROCEDURE — 84484 ASSAY OF TROPONIN QUANT: CPT | Performed by: PHYSICIAN ASSISTANT

## 2022-07-11 PROCEDURE — 83880 ASSAY OF NATRIURETIC PEPTIDE: CPT | Performed by: PHYSICIAN ASSISTANT

## 2022-07-11 PROCEDURE — 85025 COMPLETE CBC W/AUTO DIFF WBC: CPT | Performed by: NURSE PRACTITIONER

## 2022-07-11 PROCEDURE — 36600 WITHDRAWAL OF ARTERIAL BLOOD: CPT

## 2022-07-11 PROCEDURE — 96361 HYDRATE IV INFUSION ADD-ON: CPT

## 2022-07-11 RX ORDER — ACETAMINOPHEN 500 MG
1000 TABLET ORAL ONCE
Status: COMPLETED | OUTPATIENT
Start: 2022-07-11 | End: 2022-07-11

## 2022-07-11 RX ORDER — SODIUM CHLORIDE, SODIUM LACTATE, POTASSIUM CHLORIDE, CALCIUM CHLORIDE 600; 310; 30; 20 MG/100ML; MG/100ML; MG/100ML; MG/100ML
100 INJECTION, SOLUTION INTRAVENOUS CONTINUOUS
Status: DISCONTINUED | OUTPATIENT
Start: 2022-07-11 | End: 2022-07-11 | Stop reason: HOSPADM

## 2022-07-11 RX ADMIN — PERFLUTREN 8.48 MG: 6.52 INJECTION, SUSPENSION INTRAVENOUS at 15:42

## 2022-07-11 RX ADMIN — ACETAMINOPHEN 1000 MG: 500 TABLET, FILM COATED ORAL at 15:00

## 2022-07-11 RX ADMIN — SODIUM CHLORIDE, POTASSIUM CHLORIDE, SODIUM LACTATE AND CALCIUM CHLORIDE 100 ML/HR: 600; 310; 30; 20 INJECTION, SOLUTION INTRAVENOUS at 15:01

## 2022-07-11 NOTE — ED NOTES
"Stress test called- states \"pending second set of cardiac markers.\" Informed unaware required prior to test. Ordered.  "

## 2022-07-11 NOTE — ED NOTES
Pt in bed. No s/s distress noted. Denies needs. Asmt completed. Pt placed on continuous monitoring. See triage note. Speaks full sentences without diff. States CT of abd this AM because of RLQ pain for past 2 years. POC discussed. Verbalized understanding. Will cont monitoring. Call light in reach. Advised to call any needs.

## 2022-07-11 NOTE — ED NOTES
"Pt in bed. No s/s distress noted. Denies any needs. Family at bedside. Admitted chest pain was hurting again but \"not so bad\". POC dicussed. Will cont monitoring. Advised to call any needs. Call light in reach.   "

## 2022-07-11 NOTE — ED NOTES
Pt in bed. No s/s distress noted. Denies any needs/pain/discomforts. Family at bedside. POC dicussed- informed will call stress test to check status. Verbalized understanding. Will cont monitoring. Advised to call any needs. Call light in reach.

## 2022-07-11 NOTE — PROGRESS NOTES
"Chief Complaint  Abdominal Pain (He states he is having lower right abdominal pain for the past 3 months.   ) and Chest Pain (He states he is having chest pain since this morning when he left the house.  He states he has been having pain off and on but almost every day.)    Subjective    History of Present Illness      Patient presents to [facility] for   Abdominal Pain He states he is having lower right abdominal pain for the past 3 months.        Chest Pain He states he is having chest pain since this morning when he left the house.  He states he has been having pain off and on but almost every day.            Review of Systems    I have reviewed and agree with the HPI information as above.  Shirley Haines, APRN     Objective   Vital Signs:   /81   Pulse 71   Temp 97.1 °F (36.2 °C)   Resp 16   Ht 175.3 cm (69\")   Wt 78.7 kg (173 lb 6.4 oz)   SpO2 98%   BMI 25.61 kg/m²     BMI is >= 25 and <30. (Overweight) The following options were offered after discussion;: weight loss educational material (shared in after visit summary), exercise counseling/recommendations and nutrition counseling/recommendations      Physical Exam  Vitals and nursing note reviewed.   Constitutional:       Appearance: Normal appearance. He is well-developed.   HENT:      Head: Normocephalic and atraumatic.      Right Ear: Tympanic membrane, ear canal and external ear normal.      Left Ear: Tympanic membrane, ear canal and external ear normal.      Nose: Nose normal. No septal deviation, nasal tenderness or congestion.      Mouth/Throat:      Lips: Pink. No lesions.      Mouth: Mucous membranes are moist. No oral lesions.      Dentition: Normal dentition.      Pharynx: Oropharynx is clear. No pharyngeal swelling, oropharyngeal exudate or posterior oropharyngeal erythema.   Eyes:      General: Lids are normal. Vision grossly intact. No scleral icterus.        Right eye: No discharge.         Left " eye: No discharge.      Extraocular Movements: Extraocular movements intact.      Conjunctiva/sclera: Conjunctivae normal.      Right eye: Right conjunctiva is not injected.      Left eye: Left conjunctiva is not injected.      Pupils: Pupils are equal, round, and reactive to light.   Neck:      Thyroid: No thyroid mass.      Trachea: Trachea normal.   Cardiovascular:      Rate and Rhythm: Normal rate and regular rhythm.      Heart sounds: Normal heart sounds. No murmur heard.    No gallop.   Pulmonary:      Effort: Pulmonary effort is normal.      Breath sounds: Normal breath sounds and air entry. No wheezing, rhonchi or rales.   Abdominal:      General: Abdomen is flat. Bowel sounds are normal.      Palpations: Abdomen is soft.   Musculoskeletal:         General: No tenderness or deformity. Normal range of motion.      Cervical back: Full passive range of motion without pain, normal range of motion and neck supple.      Thoracic back: Normal.      Right lower leg: No edema.      Left lower leg: No edema.   Skin:     General: Skin is warm and dry.      Coloration: Skin is not jaundiced.      Findings: No rash.   Neurological:      Mental Status: He is alert and oriented to person, place, and time.      Cranial Nerves: Cranial nerves are intact.      Sensory: Sensation is intact.      Motor: Motor function is intact.      Coordination: Coordination is intact.      Gait: Gait is intact.      Deep Tendon Reflexes: Reflexes are normal and symmetric.   Psychiatric:         Mood and Affect: Mood and affect normal.         Behavior: Behavior normal.         Judgment: Judgment normal.          MARIA DEL CARMEN-7:      PHQ-2 Depression Screening  Little interest or pleasure in doing things? 0-->not at all   Feeling down, depressed, or hopeless? 0-->not at all   PHQ-2 Total Score 0     PHQ-9 Depression Screening  Little interest or pleasure in doing things? 0-->not at all   Feeling down, depressed, or hopeless? 0-->not at all   Trouble  "falling or staying asleep, or sleeping too much?     Feeling tired or having little energy?     Poor appetite or overeating?     Feeling bad about yourself - or that you are a failure or have let yourself or your family down?     Trouble concentrating on things, such as reading the newspaper or watching television?     Moving or speaking so slowly that other people could have noticed? Or the opposite - being so fidgety or restless that you have been moving around a lot more than usual?     Thoughts that you would be better off dead, or of hurting yourself in some way?     PHQ-9 Total Score 0   If you checked off any problems, how difficult have these problems made it for you to do your work, take care of things at home, or get along with other people?        Result Review  Data Reviewed:                   Assessment and Plan      Problem List Items Addressed This Visit    None     Visit Diagnoses     Right lower quadrant abdominal pain    -  Primary    Relevant Orders    CT Abdomen Pelvis With Contrast    Amylase (Completed)    CBC & Differential (Completed)    Comprehensive Metabolic Panel (Completed)    C-reactive Protein    Lipase (Completed)    Sedimentation Rate (Completed)    Other chest pain        Relevant Orders    Troponin    Treadmill Stress Test      Patient states that he has been having abd pain for about 3 months. He states that he had a \"fatty tumor\" removed 6 years ago and his stomach has not felt great since then.   Patient states that he woke up this morning with CP, tightness, and SOB. EKG was completed and was NSR. Will get labs and a CT abdomen at this time. Will set up for a stress test.     While patient was next door at [facility] lab and imaging, he developed worsening CP and SOB. Became nauseated and clammy. Patient states that he feels like there is something sitting on his chest.   Vitals are stable. Sats 100%. Pulse 64. /86. EMS was called at this point to transport him to ER.     I " spent 40 minutes caring for Frederick on this date of service. This time includes time spent by me in the following activities:obtaining and/or reviewing a separately obtained history, performing a medically appropriate examination and/or evaluation , counseling and educating the patient/family/caregiver, ordering medications, tests, or procedures, referring and communicating with other health care professionals , documenting information in the medical record and immediate transfer to a higher level of care    Follow Up   No follow-ups on file.  Patient was given instructions and counseling regarding his condition or for health maintenance advice. Please see specific information pulled into the AVS if appropriate.

## 2022-07-11 NOTE — ED PROVIDER NOTES
"Subjective   History of Present Illness    Patient is a 57-year-old male who presents to ED with mother.  Patient arrived by EMS.  Chief complaint is persistent chest pain and abdominal pain.  The patient informs me that he has been experiencing chest pain every day for at least 2 years.  He describes it as a sharp pinpoint pain in the left side of her chest that is constant.  He reports seeing his medical provider about this including outpatient stress test and echo.  He reports nothing came of it and \" I have to live with it.\"  He has also been experiencing abdominal pain for 6 years.  The mother reported that he had a lipoma removed which did not help with the pain.  He did seek medical attention for this in the past several months and had scheduled an outpatient CT scan of his abdomen pelvis.  While at the image center today, he informed that he did have chest pain.  Neglected to tell him that this chest pain is not present for 2 years.  EMS was notified and the patient was brought here.  He reports of the same pain as he has had before.  He thinks he was clammy at this time around with this chest pain.  He denies any nausea or vomiting.  Does complain of weakness.  He is unsure if he short of breath and looks to his mother and asks her if he has been more short of breath.  She states that he seems to be more short of breath than usual as of late.    Review of Systems   Constitutional: Positive for fatigue.   HENT: Negative.    Respiratory: Positive for shortness of breath.    Cardiovascular: Positive for chest pain. Negative for leg swelling.   Gastrointestinal: Negative.    Genitourinary: Negative.    Musculoskeletal: Negative.    Neurological: Negative.    Psychiatric/Behavioral: Negative.    All other systems reviewed and are negative.      Past Medical History:   Diagnosis Date   • Disease of thyroid gland    • Elevated cholesterol    • GERD (gastroesophageal reflux disease)    • Hyperlipidemia    • " Hypertension    • Irregular cardiac rhythm    • Lipoma    • Nephrolithiasis 6/18/2021       No Known Allergies    Past Surgical History:   Procedure Laterality Date   • COLONOSCOPY N/A 6/9/2021    Procedure: COLONOSCOPY WITH ANESTHESIA;  Surgeon: Willian Bajwa DO;  Location: [facility] ENDOSCOPY;  Service: Gastroenterology;  Laterality: N/A;  preop; abdominal pain  postop; rectal ulcer   PCP Edgar Gomez    • ENDOSCOPY N/A 12/16/2019    Procedure: ESOPHAGOGASTRODUODENOSCOPY WITH ANESTHESIA;  Surgeon: Willian Bajwa DO;  Location: [facility] ENDOSCOPY;  Service: Gastroenterology   • EXTRACORPOREAL SHOCK WAVE LITHOTRIPSY (ESWL) Right 8/2/2021    Procedure: EXTRACORPOREAL SHOCKWAVE LITHOTRIPSY RIGHT;  Surgeon: Mega Muñoz MD;  Location: [facility] OR;  Service: Urology;  Laterality: Right;   • GALLBLADDER SURGERY     • LIPOMA EXCISION     • TONSILLECTOMY         Family History   Problem Relation Age of Onset   • Hypertension Mother    • Heart disease Mother    • Stroke Father    • No Known Problems Sister    • No Known Problems Brother    • No Known Problems Daughter    • No Known Problems Son    • Heart disease Maternal Grandmother    • No Known Problems Paternal Grandmother    • No Known Problems Maternal Aunt    • No Known Problems Paternal Aunt    • BRCA 1/2 Neg Hx    • Breast cancer Neg Hx    • Colon cancer Neg Hx    • Endometrial cancer Neg Hx    • Ovarian cancer Neg Hx    • Colon polyps Neg Hx        Social History     Socioeconomic History   • Marital status: Single   Tobacco Use   • Smoking status: Former Smoker     Packs/day: 0.50     Years: 5.00     Pack years: 2.50     Types: Cigarettes   • Smokeless tobacco: Never Used   • Tobacco comment: 34 YRS AGO   Vaping Use   • Vaping Use: Never used   Substance and Sexual Activity   • Alcohol use: No   • Drug use: No   • Sexual activity: Defer       Prior to Admission medications    Medication Sig Start Date End Date Taking? Authorizing Provider  "  amitriptyline (ELAVIL) 50 MG tablet Take 50 mg by mouth Every Night.   Yes Jey Berrios MD   aspirin 81 MG EC tablet Take 81 mg by mouth Daily.   Yes Jey Berrios MD   Euthyrox 75 MCG tablet Take 75 mcg by mouth Every Morning. 5/13/21  Yes Jey Berrios MD   fenofibrate 160 MG tablet Take 160 mg by mouth Every Night.   Yes Jey Berrios MD   metoprolol succinate XL (TOPROL-XL) 50 MG 24 hr tablet Take 50 mg by mouth Daily.   Yes Jey Berrios MD   OLANZapine (zyPREXA) 10 MG tablet Take 10 mg by mouth Every Night.   Yes Jey Berrios MD   pantoprazole (PROTONIX) 40 MG EC tablet Take 40 mg by mouth Every Night.   Yes Jey Berrios MD   tamsulosin (FLOMAX) 0.4 MG capsule 24 hr capsule Take 1 capsule by mouth Every Night. 8/2/21  Yes Mega Muñoz MD       Medications   lactated ringers infusion (100 mL/hr Intravenous New Bag 7/11/22 1501)   acetaminophen (TYLENOL) tablet 1,000 mg (1,000 mg Oral Given 7/11/22 1500)   perflutren (DEFINITY) lipid microspheres injection 8.476 mg (8.476 mg Intravenous Given 7/11/22 1542)       /87   Pulse 64   Temp 98.3 °F (36.8 °C) (Oral)   Resp 17   Ht 175.3 cm (69.02\")   Wt 78.7 kg (173 lb 8 oz)   SpO2 95%   BMI 25.61 kg/m²       Objective   Physical Exam  Vitals reviewed.   Constitutional:       Appearance: He is well-developed.   HENT:      Head: Normocephalic and atraumatic.      Right Ear: External ear normal.      Left Ear: External ear normal.      Nose: Nose normal.   Eyes:      Conjunctiva/sclera: Conjunctivae normal.      Pupils: Pupils are equal, round, and reactive to light.   Neck:      Trachea: No tracheal deviation.   Cardiovascular:      Rate and Rhythm: Normal rate and regular rhythm.      Heart sounds: Normal heart sounds. No murmur heard.    No friction rub. No gallop.   Pulmonary:      Effort: Pulmonary effort is normal. No respiratory distress.      Breath sounds: Normal breath sounds. No " wheezing or rales.   Chest:      Chest wall: No tenderness.   Abdominal:      General: Bowel sounds are normal. There is no distension.      Palpations: Abdomen is soft. There is no mass.      Tenderness: There is no abdominal tenderness. There is no guarding or rebound.   Musculoskeletal:         General: No tenderness or deformity. Normal range of motion.      Cervical back: Normal range of motion and neck supple.      Right lower leg: No edema.      Left lower leg: No tenderness. No edema.   Skin:     General: Skin is warm and dry.      Coloration: Skin is not pale.      Findings: No erythema or rash.   Neurological:      General: No focal deficit present.      Mental Status: He is alert and oriented to person, place, and time.      Deep Tendon Reflexes: Reflexes are normal and symmetric.   Psychiatric:         Mood and Affect: Mood normal.         Behavior: Behavior normal.         Thought Content: Thought content normal.         Judgment: Judgment normal.         Procedures         Lab Results (last 24 hours)     Procedure Component Value Units Date/Time    CBC & Differential [[id number]]  (Abnormal) Collected: 07/11/22 1000    Specimen: Blood Updated: 07/11/22 1010    Narrative:      The following orders were created for panel order CBC & Differential.  Procedure                               Abnormality         Status                     ---------                               -----------         ------                     CBC Auto Differential[[id number]]        Abnormal            Final result                 Please view results for these tests on the individual orders.    Comprehensive Metabolic Panel [[id number]]  (Abnormal) Collected: 07/11/22 1000    Specimen: Blood Updated: 07/11/22 1022     Glucose 93 mg/dL      BUN 14 mg/dL      Creatinine 0.96 mg/dL      Sodium 138 mmol/L      Potassium 4.2 mmol/L      Chloride 106 mmol/L      CO2 33.0 mmol/L      Calcium 9.5 mg/dL      Total Protein 7.2 g/dL       Albumin 4.30 g/dL      ALT (SGPT) 20 U/L      AST (SGOT) 22 U/L      Alkaline Phosphatase 52 U/L      Total Bilirubin 0.2 mg/dL      Globulin 2.9 gm/dL      A/G Ratio 1.5 g/dL      BUN/Creatinine Ratio 14.6     Anion Gap -1.0 mmol/L      eGFR 93.3 mL/min/1.73      Comment: National Kidney Foundation and American Society of Nephrology (ASN) Task Force recommended calculation based on the Chronic Kidney Disease Epidemiology Collaboration (CKD-EPI) equation refit without adjustment for race.       Amylase [[id number]]  (Normal) Collected: 07/11/22 1000    Specimen: Blood Updated: 07/11/22 1022     Amylase 97 U/L     C-reactive Protein [[id number]]  (Normal) Collected: 07/11/22 1000    Specimen: Blood Updated: 07/11/22 1218     C-Reactive Protein <0.30 mg/dL     Lipase [[id number]]  (Normal) Collected: 07/11/22 1000    Specimen: Blood Updated: 07/11/22 1022     Lipase 69 U/L     Sedimentation Rate [[id number]]  (Normal) Collected: 07/11/22 1000    Specimen: Blood Updated: 07/11/22 1011     Sed Rate 3 mm/hr     Troponin [[id number]] Collected: 07/11/22 1000    Specimen: Blood Updated: 07/11/22 1000    CBC Auto Differential [[id number]]  (Abnormal) Collected: 07/11/22 1000    Specimen: Blood Updated: 07/11/22 1010     WBC 6.90 10*3/mm3      RBC 4.94 10*6/mm3      Hemoglobin 14.7 g/dL      Hematocrit 43.5 %      MCV 88.1 fL      MCH 29.8 pg      MCHC 33.8 g/dL      RDW 11.8 %      MPV 8.6 fL      Platelets 268 10*3/mm3      Neutrophil % 63.7 %      Lymphocyte % 26.4 %      Auto Mixed Cells % 9.9 %      Neutrophils, Absolute 4.40 10*3/mm3      Lymphocytes, Absolute 1.80 10*3/mm3      Auto Mixed Cells # 0.70 10*3/mm3     POC Creatinine [[id number]]  (Normal) Collected: 07/11/22 1002    Specimen: Blood Updated: 07/11/22 1648     Creatinine 0.90 mg/dL      Comment: Serial Number: [id number]:  [id number]       CBC & Differential [[id number]]  (Abnormal) Collected: 07/11/22 1101    Specimen: Blood Updated: 07/11/22 1119    Narrative:       The following orders were created for panel order CBC & Differential.  Procedure                               Abnormality         Status                     ---------                               -----------         ------                     CBC Auto Differential[[id number]]        Abnormal            Final result                 Please view results for these tests on the individual orders.    Comprehensive Metabolic Panel [[id number]]  (Abnormal) Collected: 07/11/22 1101    Specimen: Blood Updated: 07/11/22 1137     Glucose 109 mg/dL      BUN 12 mg/dL      Creatinine 0.91 mg/dL      Sodium 142 mmol/L      Potassium 3.7 mmol/L      Comment: Slight hemolysis detected by analyzer. Results may be affected.        Chloride 105 mmol/L      CO2 28.0 mmol/L      Calcium 9.4 mg/dL      Total Protein 6.7 g/dL      Albumin 4.50 g/dL      ALT (SGPT) 17 U/L      AST (SGOT) 18 U/L      Alkaline Phosphatase 53 U/L      Total Bilirubin 0.2 mg/dL      Globulin 2.2 gm/dL      A/G Ratio 2.0 g/dL      BUN/Creatinine Ratio 13.2     Anion Gap 9.0 mmol/L      eGFR 99.5 mL/min/1.73      Comment: National Kidney Foundation and American Society of Nephrology (ASN) Task Force recommended calculation based on the Chronic Kidney Disease Epidemiology Collaboration (CKD-EPI) equation refit without adjustment for race.       Narrative:      GFR Normal >60  Chronic Kidney Disease <60  Kidney Failure <15      Lipase [[id number]]  (Normal) Collected: 07/11/22 1101    Specimen: Blood Updated: 07/11/22 1133     Lipase 31 U/L     Troponin [[id number]]  (Normal) Collected: 07/11/22 1101    Specimen: Blood Updated: 07/11/22 1135     Troponin T <0.010 ng/mL     Narrative:      Troponin T Reference Range:  <= 0.03 ng/mL-   Negative for AMI  >0.03 ng/mL-     Abnormal for myocardial necrosis.  Clinicians would have to utilize clinical acumen, EKG, Troponin and serial changes to determine if it is an Acute Myocardial Infarction or myocardial injury due to  an underlying chronic condition.       Results may be falsely decreased if patient taking Biotin.      D-dimer, Quantitative [[id number]]  (Normal) Collected: 07/11/22 1101    Specimen: Blood Updated: 07/11/22 1138     D-Dimer, Quantitative <0.27 MCGFEU/mL     Narrative:      Reference Range is 0-0.50 MCGFEU/mL. However, results <0.50 MCGFEU/mL tends to rule out DVT or PE. Results >0.50 MCGFEU/mL are not useful in predicting absence or presence of DVT or PE.      BNP [[id number]]  (Normal) Collected: 07/11/22 1101    Specimen: Blood Updated: 07/11/22 1134     proBNP 15.0 pg/mL     Narrative:      Among patients with dyspnea, NT-proBNP is highly sensitive for the detection of acute congestive heart failure. In addition NT-proBNP of <300 pg/ml effectively rules out acute congestive heart failure with 99% negative predictive value.    Results may be falsely decreased if patient taking Biotin.      CBC Auto Differential [[id number]]  (Abnormal) Collected: 07/11/22 1101    Specimen: Blood Updated: 07/11/22 1119     WBC 6.60 10*3/mm3      RBC 4.97 10*6/mm3      Hemoglobin 14.8 g/dL      Hematocrit 45.0 %      MCV 90.5 fL      MCH 29.8 pg      MCHC 32.9 g/dL      RDW 12.0 %      RDW-SD 39.8 fl      MPV 9.3 fL      Platelets 270 10*3/mm3      Neutrophil % 61.7 %      Lymphocyte % 29.7 %      Monocyte % 7.0 %      Eosinophil % 0.9 %      Basophil % 0.5 %      Immature Grans % 0.2 %      Neutrophils, Absolute 4.08 10*3/mm3      Lymphocytes, Absolute 1.96 10*3/mm3      Monocytes, Absolute 0.46 10*3/mm3      Eosinophils, Absolute 0.06 10*3/mm3      Basophils, Absolute 0.03 10*3/mm3      Immature Grans, Absolute 0.01 10*3/mm3      nRBC 0.0 /100 WBC     Troponin [[id number]]  (Normal) Collected: 07/11/22 1414    Specimen: Blood Updated: 07/11/22 1447     Troponin T <0.010 ng/mL     Narrative:      Troponin T Reference Range:  <= 0.03 ng/mL-   Negative for AMI  >0.03 ng/mL-     Abnormal for myocardial necrosis.  Clinicians would have  to utilize clinical acumen, EKG, Troponin and serial changes to determine if it is an Acute Myocardial Infarction or myocardial injury due to an underlying chronic condition.       Results may be falsely decreased if patient taking Biotin.      Blood Gas, Arterial - [[id number]]  (Abnormal) Collected: 07/11/22 1634    Specimen: Arterial Blood Updated: 07/11/22 1634     Site Right Radial     Floyd's Test Positive     pH, Arterial 7.560 pH units      Comment: 83 Value above reference range        pCO2, Arterial 25.2 mm Hg      Comment: 84 Value below reference range        pO2, Arterial 107.0 mm Hg      HCO3, Arterial 22.6 mmol/L      Base Excess, Arterial 2.1 mmol/L      Comment: 83 Value above reference range        O2 Saturation, Arterial 99.8 %      Comment: 83 Value above reference range        Temperature 37.0 C      Barometric Pressure for Blood Gas 747 mmHg      Modality Room Air     Ventilator Mode NA     Collected by [id number]     Comment: Meter: [id number]     :  [id number]        pCO2, Temperature Corrected 25.2 mm Hg      pH, Temp Corrected 7.560 pH Units      pO2, Temperature Corrected 107 mm Hg           XR Chest 1 View    Result Date: 7/11/2022  Narrative: EXAM: XR CHEST 1 VW- 7/11/2022 11:29 AM CDT  HISTORY: cp   COMPARISON: November 14, 2021.  TECHNIQUE: Frontal radiograph of the chest  FINDINGS: The lungs are clear. The cardiomediastinal silhouette and pulmonary vascularity are within normal limits.  The osseous structures and surrounding soft tissues demonstrate no acute abnormality.       Impression: 1. No radiographic evidence of acute cardiopulmonary process.   This report was finalized on 07/11/2022 11:45 by Dr. Eliecer Rios MD.    Adult Stress Echo W/ Cont or Stress Agent if Necessary Per Protocol    Result Date: 7/11/2022  Narrative: · Estimated left ventricular EF = 55% Left ventricular systolic function is normal. · The patient experienced no angina during the stress test. · Low  risk stress test for stress induced myocardial ischemia  All echocardiographic phases visualized including as required with inverted grayscale imaging which shows increase in contractility wall motion and thickness both globally and regionally suggestive of no obvious evidence of stress-induced ischemia ____________________________________________________________________ Disclaimer: Despite low risk stress test for stress induced myocardial ischemia, there is a small but definite fraction of patients who will have significant underlying coronary artery disease that needs further evaluation and definitive treatment.  Missing significant coronary artery disease may result in increased morbidity and mortality.  In view of this if any symptoms such as chest pain, shortness of breath, increasing weakness, feeling dizzy, passing out, palpitations, cold sweats etc.,to seek immediate medical help. Stress test is intrinsically limited and therefore the results do not confirm or rule out presence of coronary artery disease and need to be interpreted on the basis of presentation and overall clinical picture. ______________________________________________________________________         ED Course  ED Course as of 07/11/22 1730 Mon Jul 11, 2022 1728 Patient has been reassessed.  He is sitting comfortably eating a sandwich.  He had a stress test which was negative.  I have relayed this to the patient and mother.  I do not have a clear answer to his chronic chest pain but he recommended follow-up with primary care provider.  Patient voiced understanding and he will be discharged in stable condition. [TK]      ED Course User Index  [TK] Deidra Mccarthy PA          [facility]    Final diagnoses:   Chronic chest pain          Deidra Mccarthy PA  07/11/22 1730

## 2022-07-14 LAB
QT INTERVAL: 366 MS
QT INTERVAL: 368 MS
QTC INTERVAL: 379 MS
QTC INTERVAL: 380 MS

## 2022-07-22 ENCOUNTER — HOSPITAL ENCOUNTER (OUTPATIENT)
Dept: GENERAL RADIOLOGY | Facility: HOSPITAL | Age: 57
Discharge: HOME | End: 2022-07-22

## 2022-07-22 ENCOUNTER — TELEPHONE (OUTPATIENT)
Dept: FAMILY MEDICINE CLINIC | Facility: CLINIC | Age: 57
End: 2022-07-22

## 2022-07-22 ENCOUNTER — HOSPITAL ENCOUNTER (OUTPATIENT)
Dept: CT IMAGING | Facility: HOSPITAL | Age: 57
Discharge: HOME | End: 2022-07-22

## 2022-07-22 DIAGNOSIS — R10.31 RIGHT LOWER QUADRANT ABDOMINAL PAIN: Primary | ICD-10-CM

## 2022-07-22 DIAGNOSIS — N20.0 NEPHROLITHIASIS: ICD-10-CM

## 2022-07-22 PROCEDURE — 74018 RADEX ABDOMEN 1 VIEW: CPT

## 2022-07-22 PROCEDURE — 25010000002 IOPAMIDOL 61 % SOLUTION: Performed by: NURSE PRACTITIONER

## 2022-07-22 PROCEDURE — 74177 CT ABD & PELVIS W/CONTRAST: CPT

## 2022-07-22 RX ADMIN — IOPAMIDOL 100 ML: 612 INJECTION, SOLUTION INTRAVENOUS at 12:02

## 2022-07-22 NOTE — PROGRESS NOTES
CT abdomen shows that his bile duct is very large. Reason is unknown. He needs to be referred to [facility] for further testing. He will need a ERCP that will take a look at this duct.     Patient is also severely constipated. Drink a bottle of mag citrate and take a daily stool softener

## 2022-07-22 NOTE — TELEPHONE ENCOUNTER
----- Message from BRITNEY Edwards sent at 7/22/2022  2:07 PM CDT -----  CT abdomen shows that his bile duct is very large. Reason is unknown. He needs to be referred to [facility] for further testing. He will need a ERCP that will take a look at this duct.     Patient is also severely constipated. Drink a bottle of mag citrate and take a daily stool softener

## 2022-07-27 RX ORDER — TAMSULOSIN HYDROCHLORIDE 0.4 MG/1
0.4 CAPSULE ORAL DAILY
COMMUNITY

## 2022-07-27 RX ORDER — LEVOTHYROXINE SODIUM 0.07 MG/1
75 TABLET ORAL DAILY
COMMUNITY

## 2022-08-23 ENCOUNTER — OFFICE VISIT (OUTPATIENT)
Dept: GASTROENTEROLOGY | Age: 57
End: 2022-08-23
Payer: MEDICARE

## 2022-08-23 VITALS
HEART RATE: 75 BPM | WEIGHT: 172.2 LBS | BODY MASS INDEX: 25.51 KG/M2 | DIASTOLIC BLOOD PRESSURE: 68 MMHG | SYSTOLIC BLOOD PRESSURE: 112 MMHG | HEIGHT: 69 IN | OXYGEN SATURATION: 97 %

## 2022-08-23 DIAGNOSIS — K83.8 DILATION OF COMMON BILE DUCT: Primary | ICD-10-CM

## 2022-08-23 DIAGNOSIS — R10.31 RIGHT LOWER QUADRANT ABDOMINAL PAIN: ICD-10-CM

## 2022-08-23 PROCEDURE — G8419 CALC BMI OUT NRM PARAM NOF/U: HCPCS | Performed by: NURSE PRACTITIONER

## 2022-08-23 PROCEDURE — 3017F COLORECTAL CA SCREEN DOC REV: CPT | Performed by: NURSE PRACTITIONER

## 2022-08-23 PROCEDURE — 99204 OFFICE O/P NEW MOD 45 MIN: CPT | Performed by: NURSE PRACTITIONER

## 2022-08-23 PROCEDURE — G8428 CUR MEDS NOT DOCUMENT: HCPCS | Performed by: NURSE PRACTITIONER

## 2022-08-23 PROCEDURE — 4004F PT TOBACCO SCREEN RCVD TLK: CPT | Performed by: NURSE PRACTITIONER

## 2022-08-23 NOTE — PROGRESS NOTES
Subjective:     Patient ID: Katja Puri is a 56 y.o. male  PCP: Fermín Harden MD  Referring Provider: Katelin Moore*    HPI  Patient presents to the office today with the following complaints: Follow-up      Pt seen today for c/o RLQ pain and abnormal CT scan. He reports constant pain for a few years. Pain is worse at times. Eases after a BM. BM every daily. Stools are hard. Denies any blood in stool. Denies any nausea or vomiting. CT abdomen noted significant dilation of CBD. He denies any diarrhea. Last EGD 2017 - normal (Dr. Kisha Jama)  Last Colonoscopy 2013 - (Dr. Padmini Lopez)  Family hx colon cancer - Great Uncle    Procedure Note  Radha Hammer MD - 07/22/2022   Formatting of this note might be different from the original.   EXAMINATION: CT ABDOMEN PELVIS W CONTRAST-   7/22/2022 12:02 PM CDT   HISTORY: RLQ abdominal pain (Age >= 14y); R10.31-Right lower quadrant pain   In order to have a CT radiation dose as low as reasonably achievable   Automated Exposure Control was utilized for adjustment of the mA and/or KV according to patient size. DLP in mGycm= 253   The CT scan of the abdomen and pelvis is performed after intravenous contrast enhancement. Images are acquired in axial plane with subsequent reconstruction in coronal and sagittal planes. Comparison is made with the previous study dated 11/14/2021. The lung bases included in the study are unremarkable. Low-density nodule located anteriorly in the left lobe, segment 2 and posterior medially in the right lobe, segment 7/6 are similar to the previous study. No change in size. There is a moderate intrahepatic biliary dilatation is similar to the previous study. The spleen is normal.   The liver is surgically absent. There is significant dilatation of the common bile duct which measures 1.8 cm proximally and 1.1 cm in the head of the pancreas. This has moderately increased in size since the previous study.  No intrinsic abnormality. Pancreas is normal. Pancreatic duct is not visualized. The adrenal glands bilaterally are normal.   Well-defined sharply marginated low-density mass located in the lower pole of the left kidney measures 5 cm in diameter. No change. There are tiny cortical nodule predominantly the left kidney which are too small to be further characterized. There are no radiopaque calculi. No hydronephrosis. The ureters bilaterally appear normal. The urinary bladder is poorly distended. There thickening and enhancement of the walls. There is air-fluid level in the nondependent part of the urinary bladder. Prostate is enlarged with a moderate free fluid enhancement and increased vascularity. There is a small fat-containing umbilical hernia. The stomach and duodenum are normal. Small bowel is nondistended. The appendix is normal. Large volume stool is seen in the colon is a mild diverticulosis of distal colon. No evidence for diverticulitis. Normal abdominal aorta and iliac arteries. There is no evidence of abdominal or pelvic lymphadenopathy. The images reviewed in bone window show no acute bony abnormality or a bone lesion. IMPRESSION:   1. Incompletely distended urinary bladder. Moderate thickening and a enhancement of the wall of the urinary bladder with air-fluid level. This may be due to previous instrumentation? . This may also be due to acute or subacute cystitis. Clinical correlation may be obtained. 2. The appendix is normal. The gallbladder is surgically absent. 3. Significant dilatation of common bile duct which is more pronounced in since the previous study. This is predominantly due to previous cholecystectomy. Asymptomatic further follow-up with sonography, ERCP, and or radionuclide hepatobiliary imaging may be obtained. 4. A significant large volume stool in the colon without evidence of obstruction may suggest constipation. This is similar to the previous study.    This report was finalized on 07/22/2022 12:47 by Dr. Ryan Ramírez MD.    Assessment:     No diagnosis found. Plan:   - Schedule MRI/MRCP for evaluation of CBD dilation  -  Miralax daily   - Schedule colonoscopy  Instruct on bowel prep. Nothing to eat or drink after midnight the day of the exam.  Unable to drive for 24 hours after the procedure. No aspirin or nonsteroidal anti-inflammatories for 5 days before procedure. I have discussed the benefits, alternatives, and risks (including bleeding, perforation and death)  for pursuing Endoscopy (EGD/Colonscopy/EUS/ERCP) with the patient and they are willing to continue. We also discussed the need for anesthesia, IV access, proper dietary changes, medication changes if necessary, and need for bowel prep (if ordered) prior to their Endoscopic procedure. They are aware they must have someone accompany them to their scheduled procedure to drive them home - they agree to the above and are willing to continue. Orders  No orders of the defined types were placed in this encounter. Medications  No orders of the defined types were placed in this encounter.         Patient History:     Past Medical History:   Diagnosis Date    Diverticulitis 6/2014    GERD (gastroesophageal reflux disease)     H. pylori infection 2012    Kidney stone     Liver cyst     Ulcer of abdomen wall [facility])        Past Surgical History:   Procedure Laterality Date    CHOLECYSTECTOMY  2012    Dr Jovany Springer  ? 2013    [place] [facility] RESECTION  2/21/14    left flank and back    [place] EGD TRANSORAL BIOPSY SINGLE/MULTIPLE N/A 3/28/2017    Dr Fransisco Ibrahim, [place] (-)    [place] [address] ENDOSCOPY  12/12/2014    [place]: normal EGD, negative urease (thought symptoms more c/w musculoskeletal origin)       Family History   Problem Relation Age of Onset    Heart Disease Father     Colon Cancer Other     Colon Polyps Other     Diabetes Maternal Grandmother Heart Disease Maternal Grandmother     Stroke Paternal Grandfather     Esophageal Cancer Neg Hx     Liver Cancer Neg Hx     Liver Disease Neg Hx     Rectal Cancer Neg Hx        Social History     Socioeconomic History    Marital status: Single   Tobacco Use    Smoking status: Former     Types: Cigarettes     Quit date: 10/20/1984     Years since quittin.8    Smokeless tobacco: Never    Tobacco comments:     disabled   Vaping Use    Vaping Use: Never used   Substance and Sexual Activity    Alcohol use: No     Alcohol/week: 0.0 standard drinks    Drug use: No       Current Outpatient Medications   Medication Sig Dispense Refill    levothyroxine (SYNTHROID) 75 MCG tablet Take 75 mcg by mouth in the morning. tamsulosin (FLOMAX) 0.4 MG capsule Take 0.4 mg by mouth in the morning. pantoprazole (PROTONIX) 40 MG tablet Take 40 mg by mouth daily      amitriptyline (ELAVIL) 25 MG tablet Take 25 mg by mouth nightly      fenofibrate 160 MG tablet Take 160 mg by mouth daily      OLANZapine (ZYPREXA) 10 MG tablet Take 10 mg by mouth nightly      metoprolol succinate (TOPROL XL) 50 MG extended release tablet Take 50 mg by mouth daily       No current facility-administered medications for this visit. No Known Allergies    Review of Systems   Constitutional:  Negative for activity change, appetite change, fatigue, fever and unexpected weight change. HENT:  Negative for trouble swallowing. Respiratory:  Negative for cough, choking and shortness of breath. Cardiovascular:  Negative for chest pain. Gastrointestinal:  Positive for abdominal pain and constipation. Negative for abdominal distention, anal bleeding, blood in stool, diarrhea, nausea, rectal pain and vomiting. Allergic/Immunologic: Negative for food allergies. All other systems reviewed and are negative.       Objective:     /68   Pulse 75   Ht 5' 9\" (1.753 m)   Wt 172 lb 3.2 oz (78.1 kg)   SpO2 97%   BMI 25.43 kg/m²     Physical Exam  Vitals reviewed. Constitutional:       General: He is not in acute distress. Appearance: He is well-developed. HENT:      Head: Normocephalic and atraumatic. Right Ear: External ear normal.      Left Ear: External ear normal.      Nose: Nose normal.      Comments: Mask on     Mouth/Throat:      Comments: Mask on  Eyes:      Conjunctiva/sclera: Conjunctivae normal.      Pupils: Pupils are equal, round, and reactive to light. Cardiovascular:      Rate and Rhythm: Normal rate and regular rhythm. Heart sounds: Normal heart sounds. No murmur heard. No friction rub. No gallop. Pulmonary:      Effort: Pulmonary effort is normal. No respiratory distress. Breath sounds: Normal breath sounds. Abdominal:      General: Bowel sounds are normal. There is no distension. Palpations: Abdomen is soft. There is no mass. Tenderness: There is abdominal tenderness (right side). There is no guarding or rebound. Musculoskeletal:         General: Normal range of motion. Cervical back: Normal range of motion and neck supple. Skin:     General: Skin is warm and dry. Findings: No rash. Nails: There is no clubbing. Neurological:      Mental Status: He is alert and oriented to person, place, and time. Gait: Gait normal.   Psychiatric:         Behavior: Behavior normal.         Thought Content:  Thought content normal.

## 2022-08-23 NOTE — PATIENT INSTRUCTIONS
Schedule colonoscopy. No aspirin, ibuprofen, naproxen, fish oil or vitamin E for 5 days before procedure. Do not eat or drink after midnight the day of the procedure. Allowed medications can be taken with a small sip of water. Please review your prep instructions for allowed medications. You will not be able to drive for 24 hours after the procedure due to sedation. You must have a responsible adult, 25 year or older, to accompany you and drive you home the day of your procedure. If you are on blood thinners, clearance from the prescribing physician will be obtained before your procedure is scheduled. If it is determined it is not safe to hold these medications for a short time an alternative procedure for evaluation may be recommended. Risks of colonoscopy include, but are not limited to, perforation, bleeding, and infection, Risk of perforation and bleeding are increased if there is a polyp removed. Anesthesia risks will be reviewed with you before the procedure by a member of the anesthesia department. Your physician may also schedule a follow up appointment with the nurse practitioner to discuss pathology, symptoms or to check if you have had any problems related to your procedure. If you prefer not to return to the office after your procedure please discuss this with your physician on the day of your colonoscopy. The physician will talk with you and/or your family after the procedure is completed. Final recommendations are based on the pathologist report if biopsies or specimens are taken. If polyps are removed during the procedure they will be sent to a pathologist for analysis. Unless you have a follow up appointment scheduled, you will be notified by mail of the pathology results within 4 weeks. If you have not received results after 4 weeks you may call the office to obtain this information. For Colonoscopy:   You will be given specific directions regarding restrictions to diet and bowel prep instructions including laxatives. Please read these instructions one week prior to your scheduled procedure to ensure that you are prepared. If you have any questions regarding these instructions please call our office Mon through Fri from 8:00 am to 4:00 pm.     Follow prep instructions provided for bowel prep. Take all of the bowel prep as directed. If you are having problems with nausea, stop your prep for 30-45 min to allow the nausea to subside before resuming your prep. It is important to drink plenty of fluids throughout the day before taking your laxatives. This will help to protect your kidneys, prevent dehydration and maximize the effect of the bowel prep. Your diet before a colonoscopy bowel preparation is very important to ensure a successful colon exam. It is recommended to consider certain changes to your diet three to four days prior to the procedure. Remember that your bowels need to be completely empty for the exam.    What foods are good to eat? Cut down on heavy solid foods three to four days before the procedure and start introducing lighter meals to your diet. The following food suggestions are a good part of your diet before a colonoscopy bowel preparation. Light meat that is easily digestible such as chicken (without the skin)   Potatoes without skin   Cheese   Eggs   A light meal of steamed white fish   Light clear soups    Foods and drinks to avoid  Avoid foods that contain too much fiber. Stay clear of dark colored beverages. They can stick to the walls of the digestive tract and make it difficult to differentiate from blood.  Some of these foods are:  Red meat, rice, nuts and vegetables   Milk, other milk based fluids and cream   Most fruit and puddings   Whole grain pasta   Cereals, bran and seeds   Colored beverages, especially those that are red or purple in color   Red colored Jell-O     On the day before the colonoscopy, continue to drink plenty of clear fluids. It is important   to keep yourself hydrated before the exam.     Please follow all instructions as provided for cleansing the bowel. Failure to have an adequately prepped colon may cause you to have incomplete exam with further testing required. [email or web address]     Miralax one teaspoon daily mixed as directed until you are having daily bowel movement. If no bm for 4 days increase to two teaspoons daily and gradually increase every 3-4 days until desired bowel movement is achieved. If you do not have bm for more than 4 days please use magnesim citrate, 1/2 bottle, to prevent becoming obstructed but continue to use miralax daily. If your stools become too loose or too frequent on daily dose you may reduce the amount taken daily. Make reductions gradually, every 3-4 days. Adjust dose, more or less, as needed for desired bm. You should have a soft bm at least every 3 days. Miralax is safe and effective for long term relief of chronic constipation. Drink 6-8 glasses of water daily. Regular exercise encouraged. High fiber diet recommended.

## 2022-08-24 ASSESSMENT — ENCOUNTER SYMPTOMS
ABDOMINAL PAIN: 1
ABDOMINAL DISTENTION: 0
NAUSEA: 0
ANAL BLEEDING: 0
SHORTNESS OF BREATH: 0
VOMITING: 0
BLOOD IN STOOL: 0
COUGH: 0
TROUBLE SWALLOWING: 0
CONSTIPATION: 1
CHOKING: 0
RECTAL PAIN: 0
DIARRHEA: 0

## 2022-09-06 RX ORDER — POLYETHYLENE GLYCOL 3350 17 G/17G
POWDER, FOR SOLUTION ORAL
Refills: 0 | OUTPATIENT
Start: 2022-09-06

## 2022-09-08 ENCOUNTER — HOSPITAL ENCOUNTER (OUTPATIENT)
Dept: MRI IMAGING | Age: 57
Discharge: HOME | End: 2022-09-08
Payer: MEDICARE

## 2022-09-08 DIAGNOSIS — K83.8 DILATION OF COMMON BILE DUCT: ICD-10-CM

## 2022-09-08 PROCEDURE — A9577 INJ MULTIHANCE: HCPCS | Performed by: NURSE PRACTITIONER

## 2022-09-08 PROCEDURE — 6360000004 HC RX CONTRAST MEDICATION: Performed by: NURSE PRACTITIONER

## 2022-09-08 PROCEDURE — 74183 MRI ABD W/O CNTR FLWD CNTR: CPT

## 2022-09-08 RX ADMIN — GADOBENATE DIMEGLUMINE 17 ML: 529 INJECTION, SOLUTION INTRAVENOUS at 12:21

## 2022-10-25 ENCOUNTER — HOSPITAL ENCOUNTER (OUTPATIENT)
Dept: GENERAL RADIOLOGY | Facility: HOSPITAL | Age: 57
Discharge: HOME | End: 2022-10-25
Admitting: NURSE PRACTITIONER

## 2022-10-25 ENCOUNTER — OFFICE VISIT (OUTPATIENT)
Dept: FAMILY MEDICINE CLINIC | Facility: CLINIC | Age: 57
End: 2022-10-25

## 2022-10-25 VITALS
RESPIRATION RATE: 16 BRPM | OXYGEN SATURATION: 98 % | TEMPERATURE: 97.1 F | SYSTOLIC BLOOD PRESSURE: 124 MMHG | HEIGHT: 69 IN | DIASTOLIC BLOOD PRESSURE: 82 MMHG | HEART RATE: 74 BPM | WEIGHT: 176 LBS | BODY MASS INDEX: 26.07 KG/M2

## 2022-10-25 DIAGNOSIS — M25.521 RIGHT ELBOW PAIN: Primary | ICD-10-CM

## 2022-10-25 DIAGNOSIS — R06.02 SHORTNESS OF BREATH: ICD-10-CM

## 2022-10-25 DIAGNOSIS — M77.11 LATERAL EPICONDYLITIS OF RIGHT ELBOW: ICD-10-CM

## 2022-10-25 PROCEDURE — 99214 OFFICE O/P EST MOD 30 MIN: CPT | Performed by: NURSE PRACTITIONER

## 2022-10-25 PROCEDURE — 73070 X-RAY EXAM OF ELBOW: CPT

## 2022-10-25 RX ORDER — LEVOTHYROXINE SODIUM 0.07 MG/1
75 TABLET ORAL
COMMUNITY

## 2022-10-25 RX ORDER — METHYLPREDNISOLONE 4 MG/1
TABLET ORAL
Qty: 1 EACH | Refills: 0 | Status: SHIPPED | OUTPATIENT
Start: 2022-10-25 | End: 2023-01-04

## 2022-10-25 RX ORDER — PANTOPRAZOLE SODIUM 40 MG/1
40 TABLET, DELAYED RELEASE ORAL
COMMUNITY
End: 2022-10-25 | Stop reason: SDUPTHER

## 2022-10-25 RX ORDER — LEVOTHYROXINE SODIUM 100 UG/1
100 TABLET ORAL DAILY
COMMUNITY
Start: 2022-08-17 | End: 2023-02-09

## 2022-10-25 RX ORDER — POLYETHYLENE GLYCOL 3350 17 G/17G
POWDER, FOR SOLUTION ORAL
COMMUNITY
Start: 2022-09-14

## 2022-10-25 RX ORDER — OMEPRAZOLE 20 MG/1
CAPSULE, DELAYED RELEASE ORAL
COMMUNITY
Start: 2022-08-03

## 2022-10-25 NOTE — PROGRESS NOTES
"Chief Complaint  Arm Injury    Subjective        Frederick Simms presents to [facility]  History of Present Illness  Pt is here today for right arm. Pt states he doesn't know what happened. Started hurting over a week ago.  Arm Injury   The pain is at a severity of 5/10.       Objective   Vital Signs:  /82 (BP Location: Right arm, Patient Position: Sitting, Cuff Size: Adult)   Pulse 74   Temp 97.1 °F (36.2 °C)   Resp 16   Ht 175.3 cm (69\")   Wt 79.8 kg (176 lb)   SpO2 98%   BMI 25.99 kg/m²   Estimated body mass index is 25.99 kg/m² as calculated from the following:    Height as of this encounter: 175.3 cm (69\").    Weight as of this encounter: 79.8 kg (176 lb).    BMI is >= 25 and <30. (Overweight) The following options were offered after discussion;: weight loss educational material (shared in after visit summary), exercise counseling/recommendations and nutrition counseling/recommendations      Physical Exam  Vitals and nursing note reviewed.   Constitutional:       Appearance: Normal appearance. He is well-developed.   HENT:      Head: Normocephalic and atraumatic.      Right Ear: Tympanic membrane, ear canal and external ear normal.      Left Ear: Tympanic membrane, ear canal and external ear normal.      Nose: Nose normal. No septal deviation, nasal tenderness or congestion.      Mouth/Throat:      Lips: Pink. No lesions.      Mouth: Mucous membranes are moist. No oral lesions.      Dentition: Normal dentition.      Pharynx: Oropharynx is clear. No pharyngeal swelling, oropharyngeal exudate or posterior oropharyngeal erythema.   Eyes:      General: Lids are normal. Vision grossly intact. No scleral icterus.        Right eye: No discharge.         Left eye: No discharge.      Extraocular Movements: Extraocular movements intact.      Conjunctiva/sclera: Conjunctivae normal.      Right eye: Right conjunctiva is not injected.      Left eye: Left conjunctiva is not " injected.      Pupils: Pupils are equal, round, and reactive to light.   Neck:      Thyroid: No thyroid mass.      Trachea: Trachea normal.   Cardiovascular:      Rate and Rhythm: Normal rate and regular rhythm.      Heart sounds: Normal heart sounds. No murmur heard.    No gallop.   Pulmonary:      Effort: Pulmonary effort is normal.      Breath sounds: Normal breath sounds and air entry. No wheezing, rhonchi or rales.   Musculoskeletal:         General: No deformity.      Right elbow: Swelling and effusion present. Decreased range of motion. Tenderness present.      Cervical back: Full passive range of motion without pain, normal range of motion and neck supple.      Thoracic back: Normal.      Right lower leg: No edema.      Left lower leg: No edema.   Skin:     General: Skin is warm and dry.      Coloration: Skin is not jaundiced.      Findings: No rash.   Neurological:      Mental Status: He is alert and oriented to person, place, and time.      Cranial Nerves: Cranial nerves are intact.      Sensory: Sensation is intact.      Motor: Motor function is intact.      Coordination: Coordination is intact.      Gait: Gait is intact.      Deep Tendon Reflexes: Reflexes are normal and symmetric.   Psychiatric:         Mood and Affect: Mood and affect normal.         Behavior: Behavior normal.         Judgment: Judgment normal.        Result Review :                Assessment and Plan   Diagnoses and all orders for this visit:    1. Right elbow pain (Primary)  -     XR Elbow 2 View Right (In Office)    2. Lateral epicondylitis of right elbow  -     methylPREDNISolone (MEDROL) 4 MG dose pack; Take as directed on package instructions.  Dispense: 1 each; Refill: 0    3. Shortness of breath  -     Full Pulmonary Function Test With Bronchodilator; Future    swelling and pain noted to the right elbow. Patient works on things quite a bit to stay busy. Over use suspected, but will get an xray at this time.   Xray on the elbow  "was unremarkable. Will treat as \"tennis\" elbow. Steroids, antiinflammatories,and an elbow brace.   Patient continues to c/o SOB and CP. Patient has had multiple cardiac work ups and his heart is great. His mom states that \"he's 57 and he's at the age that something could be wrong.\"  Reassurance  Was once again was provided. Mom then states \" Well, his dad has broad shoulders and it caused his lungs to collapse.\" I explained that he had a CT chest recently and it was ago, but will order PFTs  For the SOB at this time.        Follow Up   Return if symptoms worsen or fail to improve.  Patient was given instructions and counseling regarding his condition or for health maintenance advice. Please see specific information pulled into the AVS if appropriate.       "

## 2022-11-15 ENCOUNTER — HOSPITAL ENCOUNTER (OUTPATIENT)
Dept: PULMONOLOGY | Facility: HOSPITAL | Age: 57
Discharge: HOME | End: 2022-11-15
Admitting: NURSE PRACTITIONER

## 2022-11-15 DIAGNOSIS — R06.02 SHORTNESS OF BREATH: ICD-10-CM

## 2022-11-15 PROCEDURE — 94729 DIFFUSING CAPACITY: CPT

## 2022-11-15 PROCEDURE — 94729 DIFFUSING CAPACITY: CPT | Performed by: INTERNAL MEDICINE

## 2022-11-15 PROCEDURE — 94726 PLETHYSMOGRAPHY LUNG VOLUMES: CPT

## 2022-11-15 PROCEDURE — 94060 EVALUATION OF WHEEZING: CPT

## 2022-11-15 PROCEDURE — 94060 EVALUATION OF WHEEZING: CPT | Performed by: INTERNAL MEDICINE

## 2022-11-15 PROCEDURE — 94726 PLETHYSMOGRAPHY LUNG VOLUMES: CPT | Performed by: INTERNAL MEDICINE

## 2022-11-15 RX ORDER — ALBUTEROL SULFATE 2.5 MG/3ML
2.5 SOLUTION RESPIRATORY (INHALATION) ONCE
Status: COMPLETED | OUTPATIENT
Start: 2022-11-15 | End: 2022-11-15

## 2022-11-15 RX ADMIN — ALBUTEROL SULFATE 2.5 MG: 2.5 SOLUTION RESPIRATORY (INHALATION) at 13:47

## 2022-11-16 ENCOUNTER — TELEPHONE (OUTPATIENT)
Dept: FAMILY MEDICINE CLINIC | Facility: CLINIC | Age: 57
End: 2022-11-16

## 2023-01-04 ENCOUNTER — TELEPHONE (OUTPATIENT)
Dept: FAMILY MEDICINE CLINIC | Facility: CLINIC | Age: 58
End: 2023-01-04
Payer: MEDICARE

## 2023-01-04 ENCOUNTER — OFFICE VISIT (OUTPATIENT)
Dept: FAMILY MEDICINE CLINIC | Facility: CLINIC | Age: 58
End: 2023-01-04
Payer: MEDICARE

## 2023-01-04 ENCOUNTER — LAB (OUTPATIENT)
Dept: LAB | Facility: HOSPITAL | Age: 58
End: 2023-01-04
Payer: MEDICARE

## 2023-01-04 VITALS
BODY MASS INDEX: 25.18 KG/M2 | SYSTOLIC BLOOD PRESSURE: 103 MMHG | WEIGHT: 170 LBS | HEIGHT: 69 IN | DIASTOLIC BLOOD PRESSURE: 69 MMHG | OXYGEN SATURATION: 98 % | HEART RATE: 70 BPM

## 2023-01-04 DIAGNOSIS — R30.0 DYSURIA: ICD-10-CM

## 2023-01-04 DIAGNOSIS — R30.0 DYSURIA: Primary | ICD-10-CM

## 2023-01-04 DIAGNOSIS — G43.719 INTRACTABLE CHRONIC MIGRAINE WITHOUT AURA AND WITHOUT STATUS MIGRAINOSUS: ICD-10-CM

## 2023-01-04 DIAGNOSIS — N30.00 ACUTE CYSTITIS WITHOUT HEMATURIA: ICD-10-CM

## 2023-01-04 DIAGNOSIS — Z12.5 SCREENING PSA (PROSTATE SPECIFIC ANTIGEN): ICD-10-CM

## 2023-01-04 LAB
BACTERIA UR QL AUTO: ABNORMAL /HPF
BILIRUB UR QL STRIP: NEGATIVE
CLARITY UR: CLEAR
COLOR UR: YELLOW
GLUCOSE UR STRIP-MCNC: NEGATIVE MG/DL
HGB UR QL STRIP.AUTO: NEGATIVE
HYALINE CASTS UR QL AUTO: ABNORMAL /LPF
KETONES UR QL STRIP: NEGATIVE
LEUKOCYTE ESTERASE UR QL STRIP.AUTO: ABNORMAL
MUCOUS THREADS URNS QL MICRO: ABNORMAL /HPF
NITRITE UR QL STRIP: POSITIVE
PH UR STRIP.AUTO: 7 [PH] (ref 5–8)
PROT UR QL STRIP: NEGATIVE
PSA SERPL-MCNC: 0.76 NG/ML (ref 0–4)
RBC # UR STRIP: ABNORMAL /HPF
REF LAB TEST METHOD: ABNORMAL
SP GR UR STRIP: 1.01 (ref 1–1.03)
SQUAMOUS #/AREA URNS HPF: ABNORMAL /HPF
UROBILINOGEN UR QL STRIP: ABNORMAL
WBC # UR STRIP: ABNORMAL /HPF

## 2023-01-04 PROCEDURE — 36415 COLL VENOUS BLD VENIPUNCTURE: CPT

## 2023-01-04 PROCEDURE — G0103 PSA SCREENING: HCPCS

## 2023-01-04 PROCEDURE — 1160F RVW MEDS BY RX/DR IN RCRD: CPT | Performed by: NURSE PRACTITIONER

## 2023-01-04 PROCEDURE — 87077 CULTURE AEROBIC IDENTIFY: CPT

## 2023-01-04 PROCEDURE — 81001 URINALYSIS AUTO W/SCOPE: CPT

## 2023-01-04 PROCEDURE — 87186 SC STD MICRODIL/AGAR DIL: CPT

## 2023-01-04 PROCEDURE — 1159F MED LIST DOCD IN RCRD: CPT | Performed by: NURSE PRACTITIONER

## 2023-01-04 PROCEDURE — 87086 URINE CULTURE/COLONY COUNT: CPT

## 2023-01-04 PROCEDURE — 99214 OFFICE O/P EST MOD 30 MIN: CPT | Performed by: NURSE PRACTITIONER

## 2023-01-04 RX ORDER — CIPROFLOXACIN 500 MG/1
500 TABLET, FILM COATED ORAL 2 TIMES DAILY
Qty: 20 TABLET | Refills: 0 | Status: SHIPPED | OUTPATIENT
Start: 2023-01-04 | End: 2023-01-14

## 2023-01-04 RX ORDER — ATOGEPANT 60 MG/1
60 TABLET ORAL DAILY
Qty: 30 TABLET | Refills: 5 | Status: SHIPPED | OUTPATIENT
Start: 2023-01-04 | End: 2023-02-09

## 2023-01-04 NOTE — TELEPHONE ENCOUNTER
----- Message from BRITNEY Edwards sent at 1/4/2023  1:22 PM CST -----  3+ bacteria in urine. Will send in Cipro at this time  PSA level is pending

## 2023-01-04 NOTE — TELEPHONE ENCOUNTER
Attempted to all patient with with results and number is no longer in service. Called mother with no answer and no voicemail.

## 2023-01-04 NOTE — PROGRESS NOTES
Chief Complaint  Headache and Urinary Frequency    Subjective    History of Present Illness      Patient presents to [facility] for   History of Present Illness  Pt is here today with c/o migraines X1 year and urinary freq ~1 month with some incontinence. Pt states OTC medications show no improvement on migraines. No burning when urinating.       Review of Systems    I have reviewed and agree with the HPI information as above.  Shirley Haines, APRN     Objective   Vital Signs:   /69   Pulse 70   Ht 175.3 cm (69\")   Wt 77.1 kg (170 lb)   SpO2 98%   BMI 25.10 kg/m²     BMI is >= 25 and <30. (Overweight) The following options were offered after discussion;: weight loss educational material (shared in after visit summary), exercise counseling/recommendations and nutrition counseling/recommendations      Physical Exam  Vitals and nursing note reviewed.   Constitutional:       Appearance: Normal appearance. He is well-developed.   HENT:      Head: Normocephalic and atraumatic.      Right Ear: Tympanic membrane, ear canal and external ear normal.      Left Ear: Tympanic membrane, ear canal and external ear normal.      Nose: Nose normal. No septal deviation, nasal tenderness or congestion.      Mouth/Throat:      Lips: Pink. No lesions.      Mouth: Mucous membranes are moist. No oral lesions.      Dentition: Normal dentition.      Pharynx: Oropharynx is clear. No pharyngeal swelling, oropharyngeal exudate or posterior oropharyngeal erythema.   Eyes:      General: Lids are normal. Vision grossly intact. No scleral icterus.        Right eye: No discharge.         Left eye: No discharge.      Extraocular Movements: Extraocular movements intact.      Conjunctiva/sclera: Conjunctivae normal.      Right eye: Right conjunctiva is not injected.      Left eye: Left conjunctiva is not injected.      Pupils: Pupils are equal, round, and reactive to light.   Neck:      Thyroid: No  thyroid mass.      Trachea: Trachea normal.   Cardiovascular:      Rate and Rhythm: Normal rate and regular rhythm.      Heart sounds: Normal heart sounds. No murmur heard.    No gallop.   Pulmonary:      Effort: Pulmonary effort is normal.      Breath sounds: Normal breath sounds and air entry. No wheezing, rhonchi or rales.   Musculoskeletal:         General: No tenderness or deformity. Normal range of motion.      Cervical back: Full passive range of motion without pain, normal range of motion and neck supple.      Thoracic back: Normal.      Right lower leg: No edema.      Left lower leg: No edema.   Skin:     General: Skin is warm and dry.      Coloration: Skin is not jaundiced.      Findings: No rash.   Neurological:      Mental Status: He is alert and oriented to person, place, and time.      Sensory: Sensation is intact.      Motor: Motor function is intact.      Coordination: Coordination is intact.      Gait: Gait is intact.      Deep Tendon Reflexes: Reflexes are normal and symmetric.   Psychiatric:         Mood and Affect: Mood and affect normal.         Behavior: Behavior normal.         Judgment: Judgment normal.            Result Review  Data Reviewed:                   Assessment and Plan      Diagnoses and all orders for this visit:    1. Dysuria (Primary)  -     Urinalysis With Culture If Indicated - Urine, Clean Catch; Future    2. Screening PSA (prostate specific antigen)  -     PSA Screen; Future    3. Intractable chronic migraine without aura and without status migrainosus  -     Atogepant (Qulipta) 60 MG tablet; Take 1 tablet by mouth Daily.  Dispense: 30 tablet; Refill: 5    4. Acute cystitis without hematuria  -     ciprofloxacin (Cipro) 500 MG tablet; Take 1 tablet by mouth 2 (Two) Times a Day for 10 days.  Dispense: 20 tablet; Refill: 0    Plan:  1. Patient presents to the office today with complaints of a migraine for 1 year. It is at the top right of his head. He is take ASA daily for  this. He tries to limit the number of ibuprofen he takes because he does not want to hurt his kidneys. Will try to get Qulipta approved. Medication counseling completed.   2. Patient c/o urinary frequency. Will get a PSA level and UA today.   3. Call mom with results.       UTI is noted with 3+ bacteria. Will send in Cipro    Follow Up   Return if symptoms worsen or fail to improve.  Patient was given instructions and counseling regarding his condition or for health maintenance advice. Please see specific information pulled into the AVS if appropriate.

## 2023-01-06 LAB — BACTERIA SPEC AEROBE CULT: ABNORMAL

## 2023-01-09 ENCOUNTER — TELEPHONE (OUTPATIENT)
Dept: FAMILY MEDICINE CLINIC | Facility: CLINIC | Age: 58
End: 2023-01-09
Payer: MEDICARE

## 2023-01-09 NOTE — TELEPHONE ENCOUNTER
Pt's mom called , verified , discussed pt's  ua results and the antibiotic that was called in, mom lety.

## 2023-02-09 ENCOUNTER — OFFICE VISIT (OUTPATIENT)
Dept: FAMILY MEDICINE CLINIC | Facility: CLINIC | Age: 58
End: 2023-02-09
Payer: MEDICARE

## 2023-02-09 VITALS
HEIGHT: 69 IN | HEART RATE: 75 BPM | SYSTOLIC BLOOD PRESSURE: 117 MMHG | OXYGEN SATURATION: 97 % | WEIGHT: 176 LBS | DIASTOLIC BLOOD PRESSURE: 78 MMHG | BODY MASS INDEX: 26.07 KG/M2

## 2023-02-09 DIAGNOSIS — R39.198 SLOWING, URINARY STREAM: Primary | ICD-10-CM

## 2023-02-09 DIAGNOSIS — R51.9 NONINTRACTABLE HEADACHE, UNSPECIFIED CHRONICITY PATTERN, UNSPECIFIED HEADACHE TYPE: ICD-10-CM

## 2023-02-09 PROCEDURE — 99214 OFFICE O/P EST MOD 30 MIN: CPT | Performed by: NURSE PRACTITIONER

## 2023-02-09 RX ORDER — TAMSULOSIN HYDROCHLORIDE 0.4 MG/1
1 CAPSULE ORAL NIGHTLY
Qty: 30 CAPSULE | Refills: 5 | Status: SHIPPED | OUTPATIENT
Start: 2023-02-09

## 2023-02-09 RX ORDER — MELOXICAM 15 MG/1
15 TABLET ORAL DAILY
Qty: 30 TABLET | Refills: 2 | Status: SHIPPED | OUTPATIENT
Start: 2023-02-09

## 2023-02-09 NOTE — PROGRESS NOTES
"Chief Complaint  Migraine and Difficulty Urinating    Subjective    History of Present Illness      Patient presents to [facility] for   History of Present Illness  Pt is here today with c/o headaches and slow urination. Pt was seen for both symptoms at our office on 1/4 and reports no change since. Pt has not not picked a second prescription of Qulipta due to cost concerns, but states that the first round did not improve symptoms.       Review of Systems    I have reviewed and agree with the HPI and ROS information as above.  Kendy Preciado, APRN     Objective   Vital Signs:   /78   Pulse 75   Ht 175.3 cm (69\")   Wt 79.8 kg (176 lb)   SpO2 97%   BMI 25.99 kg/m²         Physical Exam  Constitutional:       Appearance: Normal appearance. He is well-developed.   HENT:      Head: Normocephalic and atraumatic.      Right Ear: Tympanic membrane, ear canal and external ear normal.      Left Ear: Tympanic membrane, ear canal and external ear normal.      Nose: Nose normal. No septal deviation, nasal tenderness or congestion.      Mouth/Throat:      Lips: Pink. No lesions.      Mouth: Mucous membranes are moist. No oral lesions.      Dentition: Normal dentition.      Pharynx: Oropharynx is clear. No pharyngeal swelling, oropharyngeal exudate or posterior oropharyngeal erythema.   Eyes:      General: Lids are normal. Vision grossly intact. No scleral icterus.        Right eye: No discharge.         Left eye: No discharge.      Extraocular Movements: Extraocular movements intact.      Conjunctiva/sclera: Conjunctivae normal.      Right eye: Right conjunctiva is not injected.      Left eye: Left conjunctiva is not injected.      Pupils: Pupils are equal, round, and reactive to light.   Neck:      Thyroid: No thyroid mass.      Trachea: Trachea normal.   Cardiovascular:      Rate and Rhythm: Normal rate and regular rhythm.      Heart sounds: Normal heart sounds. No murmur heard.    No " gallop.   Pulmonary:      Effort: Pulmonary effort is normal.      Breath sounds: Normal breath sounds and air entry. No wheezing, rhonchi or rales.   Abdominal:      General: There is no distension.      Palpations: Abdomen is soft. There is no mass.      Tenderness: There is no abdominal tenderness. There is no right CVA tenderness, left CVA tenderness, guarding or rebound.   Musculoskeletal:         General: No tenderness or deformity. Normal range of motion.      Cervical back: Full passive range of motion without pain, normal range of motion and neck supple.      Thoracic back: Normal.      Right lower leg: No edema.      Left lower leg: No edema.   Skin:     General: Skin is warm and dry.      Coloration: Skin is not jaundiced.      Findings: No rash.   Neurological:      Mental Status: He is alert and oriented to person, place, and time.      Sensory: Sensation is intact.      Motor: Motor function is intact.      Coordination: Coordination is intact.      Gait: Gait is intact.      Deep Tendon Reflexes: Reflexes are normal and symmetric.   Psychiatric:         Mood and Affect: Mood and affect normal.         Judgment: Judgment normal.           Result Review  Data Reviewed:              PSA Screen (01/04/2023 09:33)       Assessment and Plan      Diagnoses and all orders for this visit:    1. Slowing, urinary stream (Primary)  -     tamsulosin (FLOMAX) 0.4 MG capsule 24 hr capsule; Take 1 capsule by mouth Every Night.  Dispense: 30 capsule; Refill: 5    2. Nonintractable headache, unspecified chronicity pattern, unspecified headache type  -     meloxicam (Mobic) 15 MG tablet; Take 1 tablet by mouth Daily.  Dispense: 30 tablet; Refill: 2    Plan:   1. Recent UTI treatment, cipro was susceptible, PSA stable. C/o weak stream, frequency, incomplete emptying.  Will start daily flomax.   2. Headaches-nearly daily, admits to more stress, mild tmj tenderness bilat. Not unilaterally localted. No light or sound  sensitivity. BB therapy and Qulipta neither have helped. Trial mobic daily, tylenol for breakthrough headache.         Follow Up   Return for 3-6 months .  Patient was given instructions and counseling regarding his condition or for health maintenance advice. Please see specific information pulled into the AVS if appropriate.

## 2023-02-10 ENCOUNTER — TELEPHONE (OUTPATIENT)
Dept: FAMILY MEDICINE CLINIC | Facility: CLINIC | Age: 58
End: 2023-02-10
Payer: MEDICARE

## 2023-02-10 NOTE — TELEPHONE ENCOUNTER
Caller: Cesia Simms    Relationship: Mother    Best call back number: [phone]    What medication are you requesting:   tamsulosin (FLOMAX)     What are your current symptoms:   PROSTATE ISSUES    How long have you been experiencing symptoms:   ONGOING    Have you had these symptoms before:    [x] Yes  [] No    Have you been treated for these symptoms before:   [x] Yes  [] No    If a prescription is needed, what is your preferred pharmacy and phone number: [facility] [address] [place] [address] [address] [facility] [phone] [facility] [phone] [facility]     Additional notes:  PATIENT MOTHER ADVISED THAT HE WAS TAKING THIS Rx BUT IT IS NOT WORKING AND WANTED TO HAVE DOSAGE INCREASED.

## 2023-02-13 ENCOUNTER — TELEPHONE (OUTPATIENT)
Dept: FAMILY MEDICINE CLINIC | Facility: CLINIC | Age: 58
End: 2023-02-13

## 2023-02-13 NOTE — TELEPHONE ENCOUNTER
Patient's mother called today in regards to her son's Flomax. Confirmed name and . Let her know increase dosage to 2 tablets nightly for three months. She states the new script is for once daily. Wanting to know if that script can be cancelled and correct one can be sent in. Please advise

## 2023-02-13 NOTE — TELEPHONE ENCOUNTER
Can increase to 2 tabs PO nightly (0.8mg) for 3 months. If not helping will change meds/consult urology.

## 2023-02-17 ENCOUNTER — TELEPHONE (OUTPATIENT)
Dept: FAMILY MEDICINE CLINIC | Facility: CLINIC | Age: 58
End: 2023-02-17
Payer: MEDICARE

## 2023-02-17 DIAGNOSIS — R39.198 SLOWING, URINARY STREAM: Primary | ICD-10-CM

## 2023-02-17 NOTE — TELEPHONE ENCOUNTER
Caller: [facility] [address] [place] [address] [address] [facility] [phone] [facility] [phone]     Relationship: Pharmacy    What medication are you requestin tabs nightly (0.8mg) for 3 months    Additional notes: Pharmacy is asking for new prescription to reflect this dosage   negative...

## 2023-02-21 RX ORDER — TAMSULOSIN HYDROCHLORIDE 0.4 MG/1
1 CAPSULE ORAL 2 TIMES DAILY
Qty: 60 CAPSULE | Refills: 5 | Status: SHIPPED | OUTPATIENT
Start: 2023-02-21

## 2023-03-06 DIAGNOSIS — K76.89 HEPATIC CYST: Primary | ICD-10-CM

## 2023-03-07 ENCOUNTER — TELEPHONE (OUTPATIENT)
Dept: GASTROENTEROLOGY | Age: 58
End: 2023-03-07

## 2023-03-07 NOTE — TELEPHONE ENCOUNTER
03-07-23 Called to remind the patient that he is due for a liver us to ck stability in March 2023.        Phone Busy        03-7-23 Ibrahima Bowman (mother) not available

## 2023-03-08 NOTE — TELEPHONE ENCOUNTER
03-08-23 Called not available.      Routed to JULISA Andino to reach pt to get him to scheduled liver us

## 2023-03-13 NOTE — PROGRESS NOTES
Subjective    Mr. Simms is 57 y.o. male    Chief Complaint: Decreased Urine Output     History of Present Illness  Patient is a 57-year-old gentleman with last few months has had gradually worsening what he describes as decreased stream hesitancy straining frequency.  He feels like he has to go every 1520 minutes but has difficulty passing urine.  He is on tamsulosin.  Has not seen any gross hematuria or denies any urinary tract infections.  He has had no flank pain or stone episodes since he was last seen.    The following portions of the patient's history were reviewed and updated as appropriate: allergies, current medications, past family history, past medical history, past social history, past surgical history and problem list.    Review of Systems   Constitutional: Negative.    Gastrointestinal: Negative.    Genitourinary: Positive for decreased urine volume, difficulty urinating, frequency and urgency. Negative for flank pain and hematuria.   Musculoskeletal: Negative.    All other systems reviewed and are negative.        Current Outpatient Medications:   •  amitriptyline (ELAVIL) 50 MG tablet, Take 1 tablet by mouth Every Night., Disp: , Rfl:   •  aspirin 81 MG EC tablet, Take 1 tablet by mouth Daily., Disp: , Rfl:   •  fenofibrate 160 MG tablet, Take 1 tablet by mouth Every Night., Disp: , Rfl:   •  levothyroxine (SYNTHROID, LEVOTHROID) 75 MCG tablet, Take 1 tablet by mouth., Disp: , Rfl:   •  meloxicam (Mobic) 15 MG tablet, Take 1 tablet by mouth Daily., Disp: 30 tablet, Rfl: 2  •  metoprolol succinate XL (TOPROL-XL) 50 MG 24 hr tablet, Take 1 tablet by mouth Daily., Disp: , Rfl:   •  OLANZapine (zyPREXA) 10 MG tablet, Take 1 tablet by mouth Every Night., Disp: , Rfl:   •  omeprazole (priLOSEC) 20 MG capsule, TAKE 1 CAPSULE BY MOUTH ONCE DAILY FOR REFLUX, Disp: , Rfl:   •  polyethylene glycol (MIRALAX) 17 GM/SCOOP powder, MIX 17 GRAMS OF POWDER IN 6-8 OUNCES IN DRINK OF CHOICE AND TAKE ONCE DAILY FOR  CONSTIPATION, Disp: , Rfl:   •  tamsulosin (FLOMAX) 0.4 MG capsule 24 hr capsule, Take 1 capsule by mouth Every Night., Disp: 30 capsule, Rfl: 5  •  tamsulosin (FLOMAX) 0.4 MG capsule 24 hr capsule, Take 1 capsule by mouth 2 (Two) Times a Day. Take 1 capsule by mouth twice daily. (Patient not taking: Reported on 3/20/2023), Disp: 60 capsule, Rfl: 5    Past Medical History:   Diagnosis Date   • Disease of thyroid gland    • Elevated cholesterol    • GERD (gastroesophageal reflux disease)    • Hyperlipidemia    • Hypertension    • Irregular cardiac rhythm    • Lipoma    • Nephrolithiasis 2021       Past Surgical History:   Procedure Laterality Date   • COLONOSCOPY N/A 2021    Procedure: COLONOSCOPY WITH ANESTHESIA;  Surgeon: Willian Bajwa DO;  Location: [facility] END[facility];  Service: Gastroenterology;  Laterality: N/A;  preop; abdominal pain  postop; rectal ulcer   PCP Edgar Gomez    • ENDOSCOPY N/A 2019    Procedure: ESOPHAGOGASTRODUODENOSCOPY WITH ANESTHESIA;  Surgeon: Willian Bajwa DO;  Location: [facility] ENDOSCOPY;  Service: Gastroenterology   • EXTRACORPOREAL SHOCK WAVE LITHOTRIPSY (ESWL) Right 2021    Procedure: EXTRACORPOREAL SHOCKWAVE LITHOTRIPSY RIGHT;  Surgeon: Mega Muñoz MD;  Location: [facility] OR;  Service: Urology;  Laterality: Right;   • GALLBLADDER SURGERY     • LIPOMA EXCISION     • TONSILLECTOMY         Social History     Socioeconomic History   • Marital status: Single   Tobacco Use   • Smoking status: Former     Packs/day: 0.50     Years: 5.00     Pack years: 2.50     Types: Cigarettes     Quit date:      Years since quittin.2   • Smokeless tobacco: Never   • Tobacco comments:     34 YRS AGO   Vaping Use   • Vaping Use: Never used   Substance and Sexual Activity   • Alcohol use: No   • Drug use: No   • Sexual activity: Defer       Family History   Problem Relation Age of Onset   • Hypertension Mother    • Heart disease Mother    • Stroke Father    • No  "Known Problems Sister    • No Known Problems Brother    • No Known Problems Daughter    • No Known Problems Son    • Heart disease Maternal Grandmother    • No Known Problems Paternal Grandmother    • No Known Problems Maternal Aunt    • No Known Problems Paternal Aunt    • BRCA 1/2 Neg Hx    • Breast cancer Neg Hx    • Colon cancer Neg Hx    • Endometrial cancer Neg Hx    • Ovarian cancer Neg Hx    • Colon polyps Neg Hx        Objective    Temp 98.3 °F (36.8 °C)   Ht 175.3 cm (69\")   Wt 80.7 kg (178 lb)   BMI 26.29 kg/m²     Physical Exam  Vitals reviewed.   Constitutional:       Appearance: Normal appearance.   HENT:      Head: Normocephalic and atraumatic.   Pulmonary:      Effort: Pulmonary effort is normal.   Genitourinary:     Rectum: External hemorrhoid present.      Comments: Digital rectal exam revealed a smooth symmetric prostate no spacious lesions nodules asymmetry or firmness were palpated.  Skin:     General: Skin is warm and dry.   Neurological:      Mental Status: He is alert and oriented to person, place, and time.   Psychiatric:         Mood and Affect: Mood normal.         Behavior: Behavior normal.             Results for orders placed or performed in visit on 03/20/23   POC Urinalysis Dipstick, Multipro    Specimen: Urine   Result Value Ref Range    Color Yellow Yellow, Straw, Dark Yellow, Anai    Clarity, UA Clear Clear    Glucose, UA Negative Negative mg/dL    Bilirubin Negative Negative    Ketones, UA Negative Negative    Specific Gravity  1.015 1.005 - 1.030    Blood, UA Negative Negative    pH, Urine 5.5 5.0 - 8.0    Protein, POC Negative Negative mg/dL    Urobilinogen, UA 0.2 E.U./dL Normal, 0.2 E.U./dL    Nitrite, UA Negative Negative    Leukocytes Small (1+) (A) Negative     Assessment and Plan    Diagnoses and all orders for this visit:    1. Decreased urine output (Primary)  -     POC Urinalysis Dipstick, Multipro    2. Urinary hesitancy    3. Frequency of urination    Patient who " is on tamsulosin last few months he has had worsening hesitancy straining decreased flow and stream frequency he has sensation to void every 15 to 20 minutes with little output.  His urine is not infected looking.  Digital rectal exam was benign.  I feel he needs further evaluation of his lower urinary tract with cystoscopy.

## 2023-03-20 ENCOUNTER — OFFICE VISIT (OUTPATIENT)
Dept: UROLOGY | Facility: CLINIC | Age: 58
End: 2023-03-20
Payer: MEDICARE

## 2023-03-20 VITALS — TEMPERATURE: 98.3 F | HEIGHT: 69 IN | WEIGHT: 178 LBS | BODY MASS INDEX: 26.36 KG/M2

## 2023-03-20 DIAGNOSIS — R39.11 URINARY HESITANCY: ICD-10-CM

## 2023-03-20 DIAGNOSIS — R35.0 FREQUENCY OF URINATION: ICD-10-CM

## 2023-03-20 DIAGNOSIS — R34 DECREASED URINE OUTPUT: Primary | ICD-10-CM

## 2023-03-20 LAB
BILIRUB BLD-MCNC: NEGATIVE MG/DL
CLARITY, POC: CLEAR
COLOR UR: YELLOW
GLUCOSE UR STRIP-MCNC: NEGATIVE MG/DL
KETONES UR QL: NEGATIVE
LEUKOCYTE EST, POC: ABNORMAL
NITRITE UR-MCNC: NEGATIVE MG/ML
PH UR: 5.5 [PH] (ref 5–8)
PROT UR STRIP-MCNC: NEGATIVE MG/DL
RBC # UR STRIP: NEGATIVE /UL
SP GR UR: 1.01 (ref 1–1.03)
UROBILINOGEN UR QL: ABNORMAL

## 2023-03-20 PROCEDURE — 81001 URINALYSIS AUTO W/SCOPE: CPT | Performed by: PHYSICIAN ASSISTANT

## 2023-03-20 PROCEDURE — 1160F RVW MEDS BY RX/DR IN RCRD: CPT | Performed by: PHYSICIAN ASSISTANT

## 2023-03-20 PROCEDURE — 99214 OFFICE O/P EST MOD 30 MIN: CPT | Performed by: PHYSICIAN ASSISTANT

## 2023-03-20 PROCEDURE — 1159F MED LIST DOCD IN RCRD: CPT | Performed by: PHYSICIAN ASSISTANT

## 2023-04-04 ENCOUNTER — HOSPITAL ENCOUNTER (OUTPATIENT)
Dept: ULTRASOUND IMAGING | Age: 58
Discharge: HOME | End: 2023-04-04
Payer: MEDICARE

## 2023-04-04 DIAGNOSIS — K76.89 HEPATIC CYST: ICD-10-CM

## 2023-04-04 PROCEDURE — 76705 ECHO EXAM OF ABDOMEN: CPT

## 2023-04-17 ENCOUNTER — PROCEDURE VISIT (OUTPATIENT)
Dept: UROLOGY | Facility: CLINIC | Age: 58
End: 2023-04-17
Payer: MEDICARE

## 2023-04-17 DIAGNOSIS — N35.013 POST-TRAUMATIC STRICTURE OF ANTERIOR URETHRA: ICD-10-CM

## 2023-04-17 DIAGNOSIS — R34 DECREASED URINE OUTPUT: Primary | ICD-10-CM

## 2023-04-17 LAB
BILIRUB BLD-MCNC: NEGATIVE MG/DL
CLARITY, POC: CLEAR
COLOR UR: YELLOW
GLUCOSE UR STRIP-MCNC: NEGATIVE MG/DL
KETONES UR QL: NEGATIVE
LEUKOCYTE EST, POC: ABNORMAL
NITRITE UR-MCNC: NEGATIVE MG/ML
PH UR: 5.5 [PH] (ref 5–8)
PROT UR STRIP-MCNC: NEGATIVE MG/DL
RBC # UR STRIP: NEGATIVE /UL
SP GR UR: 1.02 (ref 1–1.03)
UROBILINOGEN UR QL: NORMAL

## 2023-04-17 PROCEDURE — 81001 URINALYSIS AUTO W/SCOPE: CPT | Performed by: UROLOGY

## 2023-04-17 PROCEDURE — 52000 CYSTOURETHROSCOPY: CPT | Performed by: UROLOGY

## 2023-04-17 PROCEDURE — 99214 OFFICE O/P EST MOD 30 MIN: CPT | Performed by: UROLOGY

## 2023-04-17 RX ORDER — SODIUM CHLORIDE 9 MG/ML
40 INJECTION, SOLUTION INTRAVENOUS AS NEEDED
OUTPATIENT
Start: 2023-04-17

## 2023-04-17 RX ORDER — SODIUM CHLORIDE 0.9 % (FLUSH) 0.9 %
10 SYRINGE (ML) INJECTION EVERY 12 HOURS SCHEDULED
OUTPATIENT
Start: 2023-04-17

## 2023-04-17 RX ORDER — SODIUM CHLORIDE 9 MG/ML
100 INJECTION, SOLUTION INTRAVENOUS CONTINUOUS
OUTPATIENT
Start: 2023-04-17

## 2023-04-17 RX ORDER — SODIUM CHLORIDE 0.9 % (FLUSH) 0.9 %
1-10 SYRINGE (ML) INJECTION AS NEEDED
OUTPATIENT
Start: 2023-04-17

## 2023-04-17 NOTE — PROGRESS NOTES
Subjective    Mr. Simms is 58 y.o. male    Chief Complaint: luts    History of Present Illness  Patient here for lower urinary tract symptoms.  His main complaint is frequent urination with small amounts.  He is on Flomax.  This has not improved his symptoms.  He presents today for cystoscopic examination.  The following portions of the patient's history were reviewed and updated as appropriate: allergies, current medications, past family history, past medical history, past social history, past surgical history and problem list.    Review of Systems      Current Outpatient Medications:   •  amitriptyline (ELAVIL) 50 MG tablet, Take 1 tablet by mouth Every Night., Disp: , Rfl:   •  aspirin 81 MG EC tablet, Take 1 tablet by mouth Daily., Disp: , Rfl:   •  fenofibrate 160 MG tablet, Take 1 tablet by mouth Every Night., Disp: , Rfl:   •  levothyroxine (SYNTHROID, LEVOTHROID) 75 MCG tablet, Take 1 tablet by mouth., Disp: , Rfl:   •  meloxicam (Mobic) 15 MG tablet, Take 1 tablet by mouth Daily., Disp: 30 tablet, Rfl: 2  •  metoprolol succinate XL (TOPROL-XL) 50 MG 24 hr tablet, Take 1 tablet by mouth Daily., Disp: , Rfl:   •  OLANZapine (zyPREXA) 10 MG tablet, Take 1 tablet by mouth Every Night., Disp: , Rfl:   •  omeprazole (priLOSEC) 20 MG capsule, TAKE 1 CAPSULE BY MOUTH ONCE DAILY FOR REFLUX, Disp: , Rfl:   •  polyethylene glycol (MIRALAX) 17 GM/SCOOP powder, MIX 17 GRAMS OF POWDER IN 6-8 OUNCES IN DRINK OF CHOICE AND TAKE ONCE DAILY FOR CONSTIPATION, Disp: , Rfl:   •  tamsulosin (FLOMAX) 0.4 MG capsule 24 hr capsule, Take 1 capsule by mouth Every Night., Disp: 30 capsule, Rfl: 5  •  tamsulosin (FLOMAX) 0.4 MG capsule 24 hr capsule, Take 1 capsule by mouth 2 (Two) Times a Day. Take 1 capsule by mouth twice daily. (Patient not taking: Reported on 3/20/2023), Disp: 60 capsule, Rfl: 5    Past Medical History:   Diagnosis Date   • Disease of thyroid gland    • Elevated cholesterol    • GERD (gastroesophageal reflux  disease)    • Hyperlipidemia    • Hypertension    • Irregular cardiac rhythm    • Lipoma    • Nephrolithiasis 2021       Past Surgical History:   Procedure Laterality Date   • COLONOSCOPY N/A 2021    Procedure: COLONOSCOPY WITH ANESTHESIA;  Surgeon: Willian Bajwa DO;  Location: [facility] END[facility];  Service: Gastroenterology;  Laterality: N/A;  preop; abdominal pain  postop; rectal ulcer   PCP Edgar Gomez    • ENDOSCOPY N/A 2019    Procedure: ESOPHAGOGASTRODUODENOSCOPY WITH ANESTHESIA;  Surgeon: Willian Bajwa DO;  Location: [facility] ENDOSCOPY;  Service: Gastroenterology   • EXTRACORPOREAL SHOCK WAVE LITHOTRIPSY (ESWL) Right 2021    Procedure: EXTRACORPOREAL SHOCKWAVE LITHOTRIPSY RIGHT;  Surgeon: Mega Muñoz MD;  Location: [facility] OR;  Service: Urology;  Laterality: Right;   • GALLBLADDER SURGERY     • LIPOMA EXCISION     • TONSILLECTOMY         Social History     Socioeconomic History   • Marital status: Single   Tobacco Use   • Smoking status: Former     Packs/day: 0.50     Years: 5.00     Pack years: 2.50     Types: Cigarettes     Quit date:      Years since quittin.3   • Smokeless tobacco: Never   • Tobacco comments:     34 YRS AGO   Vaping Use   • Vaping Use: Never used   Substance and Sexual Activity   • Alcohol use: No   • Drug use: No   • Sexual activity: Defer       Family History   Problem Relation Age of Onset   • Hypertension Mother    • Heart disease Mother    • Stroke Father    • No Known Problems Sister    • No Known Problems Brother    • No Known Problems Daughter    • No Known Problems Son    • Heart disease Maternal Grandmother    • No Known Problems Paternal Grandmother    • No Known Problems Maternal Aunt    • No Known Problems Paternal Aunt    • BRCA 1/2 Neg Hx    • Breast cancer Neg Hx    • Colon cancer Neg Hx    • Endometrial cancer Neg Hx    • Ovarian cancer Neg Hx    • Colon polyps Neg Hx        Objective    There were no vitals taken for this  visit.    Physical Exam  Vitals reviewed.   Constitutional:       General: He is not in acute distress.     Appearance: He is well-developed. He is not diaphoretic.   Pulmonary:      Effort: Pulmonary effort is normal.   Abdominal:      General: There is no distension.      Palpations: Abdomen is soft. There is no mass.      Tenderness: There is no abdominal tenderness. There is no guarding or rebound.      Hernia: No hernia is present.   Skin:     General: Skin is warm and dry.   Neurological:      Mental Status: He is alert and oriented to person, place, and time.             Results for orders placed or performed in visit on 04/17/23   POC Urinalysis Dipstick, Multipro    Specimen: Urine   Result Value Ref Range    Color Yellow Yellow, Straw, Dark Yellow, Anai    Clarity, UA Clear Clear    Glucose, UA Negative Negative mg/dL    Bilirubin Negative Negative    Ketones, UA Negative Negative    Specific Gravity  1.020 1.005 - 1.030    Blood, UA Negative Negative    pH, Urine 5.5 5.0 - 8.0    Protein, POC Negative Negative mg/dL    Urobilinogen, UA Normal Normal, 0.2 E.U./dL    Nitrite, UA Negative Negative    Leukocytes Trace (A) Negative     Assessment and Plan    Diagnoses and all orders for this visit:    1. Decreased urine output (Primary)  -     POC Urinalysis Dipstick, Multipro    2. Post-traumatic stricture of anterior urethra  -     Case Request; Standing  -     sodium chloride 0.9 % infusion  -     sodium chloride 0.9 % flush 10 mL  -     sodium chloride 0.9 % flush 1-10 mL  -     sodium chloride 0.9 % infusion 40 mL  -     ceFAZolin (ANCEF) 2 g in sodium chloride 0.9 % 100 mL IVPB  -     Case Request    Other orders  -     Follow Anesthesia Guidelines / Protocol; Future  -     Obtain Informed Consent; Future  -     Provide NPO Instructions to Patient; Future  -     Chlorhexidine Skin Prep; Future  -     Follow Anesthesia Guidelines / Protocol; Standing  -     SCD (Sequential Compression Device) - Place on  Patient in Pre-Op; Standing  -     Verify / Perform Chlorhexidine Skin Prep; Standing  -     Verify / Perform Chlorhexidine Skin Prep if Indicated (If Not Already Completed); Standing  -     Insert Peripheral IV; Standing  -     Saline Lock & Maintain IV Access; Standing              Pre- operative diagnosis:  Poor urinary stream    Post operative diagnosis:  Urethral stricture    Procedure:  The patient was prepped and draped in a normal sterile fashion.  The urethra was anesthetized with 2% lidocaine jelly.  A flexible cystoscope was introduced per urethra.      Urethra:  Anterior urethral stricture    Bladder:  Unable to evaluate    Ureteral orifices:  Unable to evaluate    Prostate:  Unable to evaluate    Patient tolerated the procedure well    Complications: none    Blood loss: minimal    Follow up:    Cystoscopy with balloon dilation.      Patient voiding symptoms despite alpha-blocker therapy.  Patient with anterior urethral stricture disease.  I was unable to pass the cystoscope past the area of narrowing today.  I offered him dilation in the office versus dilation under sedation.  He is opted for dilation under sedation.  We will plan for cystoscopy with urethral balloon dilation in the operating room.  Discussion of this resulted in significant evaluation management in addition to the cystoscopy performed today.

## 2023-04-17 NOTE — H&P (VIEW-ONLY)
Subjective    Mr. Simms is 58 y.o. male    Chief Complaint: luts    History of Present Illness  Patient here for lower urinary tract symptoms.  His main complaint is frequent urination with small amounts.  He is on Flomax.  This has not improved his symptoms.  He presents today for cystoscopic examination.  The following portions of the patient's history were reviewed and updated as appropriate: allergies, current medications, past family history, past medical history, past social history, past surgical history and problem list.    Review of Systems      Current Outpatient Medications:   •  amitriptyline (ELAVIL) 50 MG tablet, Take 1 tablet by mouth Every Night., Disp: , Rfl:   •  aspirin 81 MG EC tablet, Take 1 tablet by mouth Daily., Disp: , Rfl:   •  fenofibrate 160 MG tablet, Take 1 tablet by mouth Every Night., Disp: , Rfl:   •  levothyroxine (SYNTHROID, LEVOTHROID) 75 MCG tablet, Take 1 tablet by mouth., Disp: , Rfl:   •  meloxicam (Mobic) 15 MG tablet, Take 1 tablet by mouth Daily., Disp: 30 tablet, Rfl: 2  •  metoprolol succinate XL (TOPROL-XL) 50 MG 24 hr tablet, Take 1 tablet by mouth Daily., Disp: , Rfl:   •  OLANZapine (zyPREXA) 10 MG tablet, Take 1 tablet by mouth Every Night., Disp: , Rfl:   •  omeprazole (priLOSEC) 20 MG capsule, TAKE 1 CAPSULE BY MOUTH ONCE DAILY FOR REFLUX, Disp: , Rfl:   •  polyethylene glycol (MIRALAX) 17 GM/SCOOP powder, MIX 17 GRAMS OF POWDER IN 6-8 OUNCES IN DRINK OF CHOICE AND TAKE ONCE DAILY FOR CONSTIPATION, Disp: , Rfl:   •  tamsulosin (FLOMAX) 0.4 MG capsule 24 hr capsule, Take 1 capsule by mouth Every Night., Disp: 30 capsule, Rfl: 5  •  tamsulosin (FLOMAX) 0.4 MG capsule 24 hr capsule, Take 1 capsule by mouth 2 (Two) Times a Day. Take 1 capsule by mouth twice daily. (Patient not taking: Reported on 3/20/2023), Disp: 60 capsule, Rfl: 5    Past Medical History:   Diagnosis Date   • Disease of thyroid gland    • Elevated cholesterol    • GERD (gastroesophageal reflux  disease)    • Hyperlipidemia    • Hypertension    • Irregular cardiac rhythm    • Lipoma    • Nephrolithiasis 2021       Past Surgical History:   Procedure Laterality Date   • COLONOSCOPY N/A 2021    Procedure: COLONOSCOPY WITH ANESTHESIA;  Surgeon: Willian Bajwa DO;  Location: [facility] ENDOSCOPY;  Service: Gastroenterology;  Laterality: N/A;  preop; abdominal pain  postop; rectal ulcer   PCP Edgar Gomez    • ENDOSCOPY N/A 2019    Procedure: ESOPHAGOGASTRODUODENOSCOPY WITH ANESTHESIA;  Surgeon: Willian Bajwa DO;  Location: [facility] ENDOSCOPY;  Service: Gastroenterology   • EXTRACORPOREAL SHOCK WAVE LITHOTRIPSY (ESWL) Right 2021    Procedure: EXTRACORPOREAL SHOCKWAVE LITHOTRIPSY RIGHT;  Surgeon: Mega Muñoz MD;  Location: [facility] OR;  Service: Urology;  Laterality: Right;   • GALLBLADDER SURGERY     • LIPOMA EXCISION     • TONSILLECTOMY         Social History     Socioeconomic History   • Marital status: Single   Tobacco Use   • Smoking status: Former     Packs/day: 0.50     Years: 5.00     Pack years: 2.50     Types: Cigarettes     Quit date:      Years since quittin.3   • Smokeless tobacco: Never   • Tobacco comments:     34 YRS AGO   Vaping Use   • Vaping Use: Never used   Substance and Sexual Activity   • Alcohol use: No   • Drug use: No   • Sexual activity: Defer       Family History   Problem Relation Age of Onset   • Hypertension Mother    • Heart disease Mother    • Stroke Father    • No Known Problems Sister    • No Known Problems Brother    • No Known Problems Daughter    • No Known Problems Son    • Heart disease Maternal Grandmother    • No Known Problems Paternal Grandmother    • No Known Problems Maternal Aunt    • No Known Problems Paternal Aunt    • BRCA 1/2 Neg Hx    • Breast cancer Neg Hx    • Colon cancer Neg Hx    • Endometrial cancer Neg Hx    • Ovarian cancer Neg Hx    • Colon polyps Neg Hx        Objective    There were no vitals taken for this  visit.    Physical Exam  Vitals reviewed.   Constitutional:       General: He is not in acute distress.     Appearance: He is well-developed. He is not diaphoretic.   Pulmonary:      Effort: Pulmonary effort is normal.   Abdominal:      General: There is no distension.      Palpations: Abdomen is soft. There is no mass.      Tenderness: There is no abdominal tenderness. There is no guarding or rebound.      Hernia: No hernia is present.   Skin:     General: Skin is warm and dry.   Neurological:      Mental Status: He is alert and oriented to person, place, and time.             Results for orders placed or performed in visit on 04/17/23   POC Urinalysis Dipstick, Multipro    Specimen: Urine   Result Value Ref Range    Color Yellow Yellow, Straw, Dark Yellow, Anai    Clarity, UA Clear Clear    Glucose, UA Negative Negative mg/dL    Bilirubin Negative Negative    Ketones, UA Negative Negative    Specific Gravity  1.020 1.005 - 1.030    Blood, UA Negative Negative    pH, Urine 5.5 5.0 - 8.0    Protein, POC Negative Negative mg/dL    Urobilinogen, UA Normal Normal, 0.2 E.U./dL    Nitrite, UA Negative Negative    Leukocytes Trace (A) Negative     Assessment and Plan    Diagnoses and all orders for this visit:    1. Decreased urine output (Primary)  -     POC Urinalysis Dipstick, Multipro    2. Post-traumatic stricture of anterior urethra  -     Case Request; Standing  -     sodium chloride 0.9 % infusion  -     sodium chloride 0.9 % flush 10 mL  -     sodium chloride 0.9 % flush 1-10 mL  -     sodium chloride 0.9 % infusion 40 mL  -     ceFAZolin (ANCEF) 2 g in sodium chloride 0.9 % 100 mL IVPB  -     Case Request    Other orders  -     Follow Anesthesia Guidelines / Protocol; Future  -     Obtain Informed Consent; Future  -     Provide NPO Instructions to Patient; Future  -     Chlorhexidine Skin Prep; Future  -     Follow Anesthesia Guidelines / Protocol; Standing  -     SCD (Sequential Compression Device) - Place on  Patient in Pre-Op; Standing  -     Verify / Perform Chlorhexidine Skin Prep; Standing  -     Verify / Perform Chlorhexidine Skin Prep if Indicated (If Not Already Completed); Standing  -     Insert Peripheral IV; Standing  -     Saline Lock & Maintain IV Access; Standing              Pre- operative diagnosis:  Poor urinary stream    Post operative diagnosis:  Urethral stricture    Procedure:  The patient was prepped and draped in a normal sterile fashion.  The urethra was anesthetized with 2% lidocaine jelly.  A flexible cystoscope was introduced per urethra.      Urethra:  Anterior urethral stricture    Bladder:  Unable to evaluate    Ureteral orifices:  Unable to evaluate    Prostate:  Unable to evaluate    Patient tolerated the procedure well    Complications: none    Blood loss: minimal    Follow up:    Cystoscopy with balloon dilation.      Patient voiding symptoms despite alpha-blocker therapy.  Patient with anterior urethral stricture disease.  I was unable to pass the cystoscope past the area of narrowing today.  I offered him dilation in the office versus dilation under sedation.  He is opted for dilation under sedation.  We will plan for cystoscopy with urethral balloon dilation in the operating room.  Discussion of this resulted in significant evaluation management in addition to the cystoscopy performed today.

## 2023-04-19 ENCOUNTER — APPOINTMENT (OUTPATIENT)
Dept: GENERAL RADIOLOGY | Facility: HOSPITAL | Age: 58
End: 2023-04-19
Payer: MEDICARE

## 2023-04-19 ENCOUNTER — HOSPITAL ENCOUNTER (EMERGENCY)
Facility: HOSPITAL | Age: 58
Discharge: HOME | End: 2023-04-19
Attending: FAMILY MEDICINE | Admitting: FAMILY MEDICINE
Payer: MEDICARE

## 2023-04-19 VITALS
BODY MASS INDEX: 26.36 KG/M2 | OXYGEN SATURATION: 95 % | SYSTOLIC BLOOD PRESSURE: 99 MMHG | TEMPERATURE: 99.9 F | HEART RATE: 89 BPM | RESPIRATION RATE: 16 BRPM | DIASTOLIC BLOOD PRESSURE: 64 MMHG | WEIGHT: 178 LBS | HEIGHT: 69 IN

## 2023-04-19 DIAGNOSIS — R07.9 CHEST PAIN, UNSPECIFIED TYPE: Primary | ICD-10-CM

## 2023-04-19 DIAGNOSIS — B34.9 VIRAL ILLNESS: ICD-10-CM

## 2023-04-19 LAB
ALBUMIN SERPL-MCNC: 4.3 G/DL (ref 3.5–5.2)
ALBUMIN/GLOB SERPL: 2.2 G/DL
ALP SERPL-CCNC: 44 U/L (ref 39–117)
ALT SERPL W P-5'-P-CCNC: 19 U/L (ref 1–41)
ANION GAP SERPL CALCULATED.3IONS-SCNC: 8 MMOL/L (ref 5–15)
AST SERPL-CCNC: 22 U/L (ref 1–40)
B PARAPERT DNA SPEC QL NAA+PROBE: NOT DETECTED
B PERT DNA SPEC QL NAA+PROBE: NOT DETECTED
BASOPHILS # BLD AUTO: 0.03 10*3/MM3 (ref 0–0.2)
BASOPHILS NFR BLD AUTO: 0.4 % (ref 0–1.5)
BILIRUB SERPL-MCNC: 0.4 MG/DL (ref 0–1.2)
BUN SERPL-MCNC: 13 MG/DL (ref 6–20)
BUN/CREAT SERPL: 13.4 (ref 7–25)
C PNEUM DNA NPH QL NAA+NON-PROBE: NOT DETECTED
CALCIUM SPEC-SCNC: 9.1 MG/DL (ref 8.6–10.5)
CHLORIDE SERPL-SCNC: 101 MMOL/L (ref 98–107)
CK SERPL-CCNC: 186 U/L (ref 20–200)
CO2 SERPL-SCNC: 27 MMOL/L (ref 22–29)
CREAT SERPL-MCNC: 0.97 MG/DL (ref 0.76–1.27)
D DIMER PPP FEU-MCNC: 0.4 MCGFEU/ML (ref 0–0.56)
DEPRECATED RDW RBC AUTO: 39.8 FL (ref 37–54)
EGFRCR SERPLBLD CKD-EPI 2021: 91.6 ML/MIN/1.73
EOSINOPHIL # BLD AUTO: 0.01 10*3/MM3 (ref 0–0.4)
EOSINOPHIL NFR BLD AUTO: 0.1 % (ref 0.3–6.2)
ERYTHROCYTE [DISTWIDTH] IN BLOOD BY AUTOMATED COUNT: 12 % (ref 12.3–15.4)
FLUAV SUBTYP SPEC NAA+PROBE: NOT DETECTED
FLUBV RNA ISLT QL NAA+PROBE: NOT DETECTED
GEN 5 2HR TROPONIN T REFLEX: <6 NG/L
GLOBULIN UR ELPH-MCNC: 2 GM/DL
GLUCOSE SERPL-MCNC: 117 MG/DL (ref 65–99)
HADV DNA SPEC NAA+PROBE: NOT DETECTED
HCOV 229E RNA SPEC QL NAA+PROBE: NOT DETECTED
HCOV HKU1 RNA SPEC QL NAA+PROBE: NOT DETECTED
HCOV NL63 RNA SPEC QL NAA+PROBE: NOT DETECTED
HCOV OC43 RNA SPEC QL NAA+PROBE: NOT DETECTED
HCT VFR BLD AUTO: 41.1 % (ref 37.5–51)
HGB BLD-MCNC: 13.4 G/DL (ref 13–17.7)
HMPV RNA NPH QL NAA+NON-PROBE: NOT DETECTED
HPIV1 RNA ISLT QL NAA+PROBE: NOT DETECTED
HPIV2 RNA SPEC QL NAA+PROBE: NOT DETECTED
HPIV3 RNA NPH QL NAA+PROBE: NOT DETECTED
HPIV4 P GENE NPH QL NAA+PROBE: NOT DETECTED
IMM GRANULOCYTES # BLD AUTO: 0.02 10*3/MM3 (ref 0–0.05)
IMM GRANULOCYTES NFR BLD AUTO: 0.3 % (ref 0–0.5)
INR PPP: 0.97 (ref 0.91–1.09)
LYMPHOCYTES # BLD AUTO: 0.45 10*3/MM3 (ref 0.7–3.1)
LYMPHOCYTES NFR BLD AUTO: 6.1 % (ref 19.6–45.3)
M PNEUMO IGG SER IA-ACNC: NOT DETECTED
MAGNESIUM SERPL-MCNC: 1.6 MG/DL (ref 1.6–2.6)
MCH RBC QN AUTO: 29.5 PG (ref 26.6–33)
MCHC RBC AUTO-ENTMCNC: 32.6 G/DL (ref 31.5–35.7)
MCV RBC AUTO: 90.3 FL (ref 79–97)
MONOCYTES # BLD AUTO: 0.54 10*3/MM3 (ref 0.1–0.9)
MONOCYTES NFR BLD AUTO: 7.3 % (ref 5–12)
NEUTROPHILS NFR BLD AUTO: 6.33 10*3/MM3 (ref 1.7–7)
NEUTROPHILS NFR BLD AUTO: 85.8 % (ref 42.7–76)
NRBC BLD AUTO-RTO: 0 /100 WBC (ref 0–0.2)
NT-PROBNP SERPL-MCNC: 114.6 PG/ML (ref 0–900)
PLATELET # BLD AUTO: 195 10*3/MM3 (ref 140–450)
PMV BLD AUTO: 9.2 FL (ref 6–12)
POTASSIUM SERPL-SCNC: 3.7 MMOL/L (ref 3.5–5.2)
PROCALCITONIN SERPL-MCNC: 0.29 NG/ML (ref 0–0.25)
PROT SERPL-MCNC: 6.3 G/DL (ref 6–8.5)
PROTHROMBIN TIME: 13 SECONDS (ref 11.8–14.8)
RBC # BLD AUTO: 4.55 10*6/MM3 (ref 4.14–5.8)
RHINOVIRUS RNA SPEC NAA+PROBE: NOT DETECTED
RSV RNA NPH QL NAA+NON-PROBE: NOT DETECTED
SARS-COV-2 RNA NPH QL NAA+NON-PROBE: NOT DETECTED
SODIUM SERPL-SCNC: 136 MMOL/L (ref 136–145)
TROPONIN T DELTA: NORMAL
TROPONIN T SERPL HS-MCNC: <6 NG/L
WBC NRBC COR # BLD: 7.38 10*3/MM3 (ref 3.4–10.8)

## 2023-04-19 PROCEDURE — 85025 COMPLETE CBC W/AUTO DIFF WBC: CPT | Performed by: FAMILY MEDICINE

## 2023-04-19 PROCEDURE — 36415 COLL VENOUS BLD VENIPUNCTURE: CPT

## 2023-04-19 PROCEDURE — 99285 EMERGENCY DEPT VISIT HI MDM: CPT

## 2023-04-19 PROCEDURE — 83735 ASSAY OF MAGNESIUM: CPT | Performed by: FAMILY MEDICINE

## 2023-04-19 PROCEDURE — 82550 ASSAY OF CK (CPK): CPT | Performed by: FAMILY MEDICINE

## 2023-04-19 PROCEDURE — 93010 ELECTROCARDIOGRAM REPORT: CPT | Performed by: INTERNAL MEDICINE

## 2023-04-19 PROCEDURE — 85379 FIBRIN DEGRADATION QUANT: CPT | Performed by: FAMILY MEDICINE

## 2023-04-19 PROCEDURE — 71045 X-RAY EXAM CHEST 1 VIEW: CPT

## 2023-04-19 PROCEDURE — 85610 PROTHROMBIN TIME: CPT | Performed by: FAMILY MEDICINE

## 2023-04-19 PROCEDURE — 87076 CULTURE ANAEROBE IDENT EACH: CPT | Performed by: FAMILY MEDICINE

## 2023-04-19 PROCEDURE — 84145 PROCALCITONIN (PCT): CPT | Performed by: FAMILY MEDICINE

## 2023-04-19 PROCEDURE — 93005 ELECTROCARDIOGRAM TRACING: CPT | Performed by: FAMILY MEDICINE

## 2023-04-19 PROCEDURE — 0202U NFCT DS 22 TRGT SARS-COV-2: CPT | Performed by: FAMILY MEDICINE

## 2023-04-19 PROCEDURE — 84484 ASSAY OF TROPONIN QUANT: CPT | Performed by: FAMILY MEDICINE

## 2023-04-19 PROCEDURE — 83880 ASSAY OF NATRIURETIC PEPTIDE: CPT | Performed by: FAMILY MEDICINE

## 2023-04-19 PROCEDURE — 93005 ELECTROCARDIOGRAM TRACING: CPT

## 2023-04-19 PROCEDURE — 87040 BLOOD CULTURE FOR BACTERIA: CPT | Performed by: FAMILY MEDICINE

## 2023-04-19 PROCEDURE — 80053 COMPREHEN METABOLIC PANEL: CPT | Performed by: FAMILY MEDICINE

## 2023-04-19 RX ORDER — SODIUM CHLORIDE 0.9 % (FLUSH) 0.9 %
10 SYRINGE (ML) INJECTION AS NEEDED
Status: DISCONTINUED | OUTPATIENT
Start: 2023-04-19 | End: 2023-04-19 | Stop reason: HOSPADM

## 2023-04-19 RX ORDER — NITROGLYCERIN 0.4 MG/1
0.4 TABLET SUBLINGUAL
Status: DISCONTINUED | OUTPATIENT
Start: 2023-04-19 | End: 2023-04-19 | Stop reason: HOSPADM

## 2023-04-19 RX ORDER — ACETAMINOPHEN 500 MG
1000 TABLET ORAL ONCE
Status: COMPLETED | OUTPATIENT
Start: 2023-04-19 | End: 2023-04-19

## 2023-04-19 RX ADMIN — SODIUM CHLORIDE 1000 ML: 9 INJECTION, SOLUTION INTRAVENOUS at 15:11

## 2023-04-19 RX ADMIN — ACETAMINOPHEN 1000 MG: 500 TABLET, FILM COATED ORAL at 15:06

## 2023-04-19 RX ADMIN — NITROGLYCERIN 0.4 MG: 0.4 TABLET SUBLINGUAL at 17:28

## 2023-04-19 NOTE — ED PROVIDER NOTES
Subjective   History of Present Illness  58-year-old male comes into the emergency room with complaints of chest pain and shortness of breath.  Patient states as a sharp in nature chest pain.  Patient states that shortness of breath and chest pain all began this morning.  Patient states that he also has had a slight cough.  Patient states he has a nonproductive cough.  Patient states that he also did feel hot and sweaty today.  Patient denies any nausea vomiting.  Patient denies any abdominal pain.  Patient denies any urinary symptoms.        Review of Systems   Constitutional: Positive for diaphoresis.   Respiratory: Positive for cough and shortness of breath.    Cardiovascular: Positive for chest pain.   All other systems reviewed and are negative.      Past Medical History:   Diagnosis Date   • Disease of thyroid gland    • Elevated cholesterol    • GERD (gastroesophageal reflux disease)    • Hyperlipidemia    • Hypertension    • Irregular cardiac rhythm    • Lipoma    • Nephrolithiasis 6/18/2021       No Known Allergies    Past Surgical History:   Procedure Laterality Date   • COLONOSCOPY N/A 6/9/2021    Procedure: COLONOSCOPY WITH ANESTHESIA;  Surgeon: Willian Bajwa DO;  Location: [facility] ENDOSCOPY;  Service: Gastroenterology;  Laterality: N/A;  preop; abdominal pain  postop; rectal ulcer   PCP Edgar Gomez    • ENDOSCOPY N/A 12/16/2019    Procedure: ESOPHAGOGASTRODUODENOSCOPY WITH ANESTHESIA;  Surgeon: Willian Bajwa DO;  Location: [facility] ENDOSCOPY;  Service: Gastroenterology   • EXTRACORPOREAL SHOCK WAVE LITHOTRIPSY (ESWL) Right 8/2/2021    Procedure: EXTRACORPOREAL SHOCKWAVE LITHOTRIPSY RIGHT;  Surgeon: Mega Muñoz MD;  Location: [facility] OR;  Service: Urology;  Laterality: Right;   • GALLBLADDER SURGERY     • LIPOMA EXCISION     • TONSILLECTOMY         Family History   Problem Relation Age of Onset   • Hypertension Mother    • Heart disease Mother    • Stroke Father    • No Known Problems  Sister    • No Known Problems Brother    • No Known Problems Daughter    • No Known Problems Son    • Heart disease Maternal Grandmother    • No Known Problems Paternal Grandmother    • No Known Problems Maternal Aunt    • No Known Problems Paternal Aunt    • BRCA 1/2 Neg Hx    • Breast cancer Neg Hx    • Colon cancer Neg Hx    • Endometrial cancer Neg Hx    • Ovarian cancer Neg Hx    • Colon polyps Neg Hx        Social History     Socioeconomic History   • Marital status: Single   Tobacco Use   • Smoking status: Former     Packs/day: 0.50     Years: 5.00     Pack years: 2.50     Types: Cigarettes     Quit date:      Years since quittin.3   • Smokeless tobacco: Never   • Tobacco comments:     34 YRS AGO   Vaping Use   • Vaping Use: Never used   Substance and Sexual Activity   • Alcohol use: No   • Drug use: No   • Sexual activity: Defer           Objective   Physical Exam  Vitals and nursing note reviewed.   Constitutional:       Appearance: He is well-developed.   HENT:      Head: Normocephalic and atraumatic.   Eyes:      Extraocular Movements: Extraocular movements intact.      Pupils: Pupils are equal, round, and reactive to light.   Cardiovascular:      Rate and Rhythm: Normal rate and regular rhythm.      Heart sounds: Normal heart sounds.   Pulmonary:      Effort: Pulmonary effort is normal.      Breath sounds: Normal breath sounds.   Abdominal:      General: Bowel sounds are normal.      Palpations: Abdomen is soft.      Tenderness: There is no abdominal tenderness.   Musculoskeletal:      Right lower leg: No tenderness. No edema.      Left lower leg: No tenderness. No edema.   Skin:     General: Skin is warm and dry.   Neurological:      General: No focal deficit present.      Mental Status: He is alert and oriented to person, place, and time.   Psychiatric:         Mood and Affect: Mood normal.         Behavior: Behavior normal.         Procedures           ED Course  ED Course as of 23    Wed Apr 19, 2023   1514 EKG rate 101  Sinus tachycardia  No STEMI [RP]   1515 EXAMINATION: XR CHEST 1 VW- 4/19/2023 3:08 PM CDT     HISTORY: Chest pain     REPORT: A frontal view of the chest was obtained.     COMPARISON: Chest x-ray 07/11/2022.     The lungs are clear, normally expanded. Heart size is normal. No  pneumothorax or pleural effusion is identified. The osseous structures  and upper abdomen are unremarkable.     IMPRESSION:  No acute cardiopulmonary abnormality.  This report was finalized on 04/19/2023 15:09 by Dr. Bruce Workman MD. [RP]   1733 EKG rate 87  Normal sinus rhythm  No STEMI [RP]      ED Course User Index  [RP] Go Cordero MD                      HEART Score: 2                     Lab Results (last 24 hours)     Procedure Component Value Units Date/Time    CBC & Differential [[id number]]  (Abnormal) Collected: 04/19/23 1503    Specimen: Blood Updated: 04/19/23 1519    Narrative:      The following orders were created for panel order CBC & Differential.  Procedure                               Abnormality         Status                     ---------                               -----------         ------                     CBC Auto Differential[[id number]]        Abnormal            Final result                 Please view results for these tests on the individual orders.    Comprehensive Metabolic Panel [[id number]]  (Abnormal) Collected: 04/19/23 1503    Specimen: Blood Updated: 04/19/23 1539     Glucose 117 mg/dL      BUN 13 mg/dL      Creatinine 0.97 mg/dL      Sodium 136 mmol/L      Potassium 3.7 mmol/L      Chloride 101 mmol/L      CO2 27.0 mmol/L      Calcium 9.1 mg/dL      Total Protein 6.3 g/dL      Albumin 4.3 g/dL      ALT (SGPT) 19 U/L      AST (SGOT) 22 U/L      Alkaline Phosphatase 44 U/L      Total Bilirubin 0.4 mg/dL      Globulin 2.0 gm/dL      A/G Ratio 2.2 g/dL      BUN/Creatinine Ratio 13.4     Anion Gap 8.0 mmol/L      eGFR 91.6 mL/min/1.73     Narrative:      GFR  "Normal >60  Chronic Kidney Disease <60  Kidney Failure <15      Protime-INR [[id number]]  (Normal) Collected: 04/19/23 1503    Specimen: Blood Updated: 04/19/23 1530     Protime 13.0 Seconds      INR 0.97    High Sensitivity Troponin T [[id number]]  (Normal) Collected: 04/19/23 1503    Specimen: Blood Updated: 04/19/23 1535     HS Troponin T <6 ng/L     Narrative:      High Sensitive Troponin T Reference Range:  <10.0 ng/L- Negative Female for AMI  <15.0 ng/L- Negative Male for AMI  >=10 - Abnormal Female indicating possible myocardial injury.  >=15 - Abnormal Male indicating possible myocardial injury.   Clinicians would have to utilize clinical acumen, EKG, Troponin, and serial changes to determine if it is an Acute Myocardial Infarction or myocardial injury due to an underlying chronic condition.         D-dimer, Quantitative [[id number]]  (Normal) Collected: 04/19/23 1503    Specimen: Blood Updated: 04/19/23 1530     D-Dimer, Quantitative 0.40 MCGFEU/mL     Narrative:      According to the assay 's published package insert, a normal (<0.50 MCGFEU/mL) D-dimer result in conjunction with a non-high clinical probability assessment, excludes deep vein thrombosis (DVT) and pulmonary embolism (PE) with high sensitivity.    D-dimer values increase with age and this can make VTE exclusion of an older population difficult. To address this, the [employer], based on best available evidence and recent guidelines, recommends that clinicians use age-adjusted D-dimer thresholds in patients greater than 50 years of age with: a) a low probability of PE who do not meet all Pulmonary Embolism Rule Out Criteria, or b) in those with intermediate probability of PE.   The formula for an age-adjusted D-dimer cut-off is \"age/100\".  For example, a 60 year old patient would have an age-adjusted cut-off of 0.60 MCGFEU/mL and an 80 year old 0.80 MCGFEU/mL.    BNP [[id number]]  (Normal) Collected: 04/19/23 1503    " Specimen: Blood Updated: 04/19/23 1535     proBNP 114.6 pg/mL     Narrative:      Among patients with dyspnea, NT-proBNP is highly sensitive for the detection of acute congestive heart failure. In addition NT-proBNP of <300 pg/ml effectively rules out acute congestive heart failure with 99% negative predictive value.    Results may be falsely decreased if patient taking Biotin.      CK [[id number]]  (Normal) Collected: 04/19/23 1503    Specimen: Blood Updated: 04/19/23 1538     Creatine Kinase 186 U/L     Magnesium [[id number]]  (Normal) Collected: 04/19/23 1503    Specimen: Blood Updated: 04/19/23 1535     Magnesium 1.6 mg/dL     CBC Auto Differential [[id number]]  (Abnormal) Collected: 04/19/23 1503    Specimen: Blood Updated: 04/19/23 1519     WBC 7.38 10*3/mm3      RBC 4.55 10*6/mm3      Hemoglobin 13.4 g/dL      Hematocrit 41.1 %      MCV 90.3 fL      MCH 29.5 pg      MCHC 32.6 g/dL      RDW 12.0 %      RDW-SD 39.8 fl      MPV 9.2 fL      Platelets 195 10*3/mm3      Neutrophil % 85.8 %      Lymphocyte % 6.1 %      Monocyte % 7.3 %      Eosinophil % 0.1 %      Basophil % 0.4 %      Immature Grans % 0.3 %      Neutrophils, Absolute 6.33 10*3/mm3      Lymphocytes, Absolute 0.45 10*3/mm3      Monocytes, Absolute 0.54 10*3/mm3      Eosinophils, Absolute 0.01 10*3/mm3      Basophils, Absolute 0.03 10*3/mm3      Immature Grans, Absolute 0.02 10*3/mm3      nRBC 0.0 /100 WBC     Procalcitonin [[id number]]  (Abnormal) Collected: 04/19/23 1503    Specimen: Blood Updated: 04/19/23 1545     Procalcitonin 0.29 ng/mL     Narrative:      As a Marker for Sepsis (Non-Neonates):    1. <0.5 ng/mL represents a low risk of severe sepsis and/or septic shock.  2. >2 ng/mL represents a high risk of severe sepsis and/or septic shock.    As a Marker for Lower Respiratory Tract Infections that require antibiotic therapy:    PCT on Admission    Antibiotic Therapy       6-12 Hrs later    >0.5                Strongly Recommended  >0.25 - <0.5     "    Recommended   0.1 - 0.25          Discouraged              Remeasure/reassess PCT  <0.1                Strongly Discouraged     Remeasure/reassess PCT    As 28 day mortality risk marker: \"Change in Procalcitonin Result\" (>80% or <=80%) if Day 0 (or Day 1) and Day 4 values are available. Refer to http://www.[email or web address] [place]pct-calculator.[email or web address]    Change in PCT <=80%  A decrease of PCT levels below or equal to 80% defines a positive change in PCT test result representing a higher risk for 28-day all-cause mortality of patients diagnosed with severe sepsis for septic shock.    Change in PCT >80%  A decrease of PCT levels of more than 80% defines a negative change in PCT result representing a lower risk for 28-day all-cause mortality of patients diagnosed with severe sepsis or septic shock.       Blood Culture - Blood, Arm, Left [[id number]] Collected: 04/19/23 1504    Specimen: Blood from Arm, Left Updated: 04/19/23 1527    Blood Culture - Blood, Arm, Right [[id number]] Collected: 04/19/23 1505    Specimen: Blood from Arm, Right Updated: 04/19/23 1527    High Sensitivity Troponin T 2Hr [[id number]] Collected: 04/19/23 1724    Specimen: Blood Updated: 04/19/23 1749     HS Troponin T <6 ng/L      Troponin T Delta --     Comment: Unable to calculate.       Narrative:      High Sensitive Troponin T Reference Range:  <10.0 ng/L- Negative Female for AMI  <15.0 ng/L- Negative Male for AMI  >=10 - Abnormal Female indicating possible myocardial injury.  >=15 - Abnormal Male indicating possible myocardial injury.   Clinicians would have to utilize clinical acumen, EKG, Troponin, and serial changes to determine if it is an Acute Myocardial Infarction or myocardial injury due to an underlying chronic condition.         Respiratory Panel PCR w/COVID-19(SARS-CoV-2) [facility] In-House, NP Swab in UTM/VTM, 3-4 HR TAT - Swab, Nasopharynx [[id number]]  (Normal) Collected: 04/19/23 1804    Specimen: Swab from Nasopharynx Updated: " 04/19/23 1904     ADENOVIRUS, PCR Not Detected     Coronavirus 229E Not Detected     Coronavirus HKU1 Not Detected     Coronavirus NL63 Not Detected     Coronavirus OC43 Not Detected     COVID19 Not Detected     Human Metapneumovirus Not Detected     Human Rhinovirus/Enterovirus Not Detected     Influenza A PCR Not Detected     Influenza B PCR Not Detected     Parainfluenza Virus 1 Not Detected     Parainfluenza Virus 2 Not Detected     Parainfluenza Virus 3 Not Detected     Parainfluenza Virus 4 Not Detected     RSV, PCR Not Detected     Bordetella pertussis pcr Not Detected     Bordetella parapertussis PCR Not Detected     Chlamydophila pneumoniae PCR Not Detected     Mycoplasma pneumo by PCR Not Detected    Narrative:      In the setting of a positive respiratory panel with a viral infection PLUS a negative procalcitonin without other underlying concern for bacterial infection, consider observing off antibiotics or discontinuation of antibiotics and continue supportive care. If the respiratory panel is positive for atypical bacterial infection (Bordetella pertussis, Chlamydophila pneumoniae, or Mycoplasma pneumoniae), consider antibiotic de-escalation to target atypical bacterial infection.            Medical Decision Making  58-year-old male came into the emergency room with chest pain.  Patient also was found to be febrile.  Patient was in no severe distress.  Patient's EKG was negative for any acute findings.  Patient did have 2 negative troponins.  Patient does have a heart score of 3 based off of his age and risk factors.  Patient does not appear to be in any acute coronary syndrome.  Patient will be discharged at this time.  Patient be advised to follow-up with his primary care provider for outpatient work-up of his chest pain.  All questions were answered for the patient prior to discharge.  Patient's COVID-19 and influenza were negative.  Patient likely does have a viral illness.  Patient has no signs of  pneumonia.  Patient was advised to return to the emergency room with new or worsening symptoms.  Patient and family member who is present bedside verbalized understanding and were agreeable to plan as discussed.    Chest pain, unspecified type: acute illness or injury  Viral illness: acute illness or injury  Amount and/or Complexity of Data Reviewed  Labs: ordered. Decision-making details documented in ED Course.  Radiology: ordered. Decision-making details documented in ED Course.  ECG/medicine tests: ordered. Decision-making details documented in ED Course.      Risk  OTC drugs.  Prescription drug management.          Final diagnoses:   Chest pain, unspecified type   Viral illness       ED Disposition  ED Disposition     ED Disposition   Discharge    Condition   Stable    Comment   --             Edgar Gomez Jr., MD  [address] [facility] [id number] [phone]    Schedule an appointment as soon as possible for a visit       [facility] Emergency Department  [address] [facility] [address] [phone]    As needed, If symptoms worsen    Dustin Estrella MD  [address] [id number] [phone]    Schedule an appointment as soon as possible for a visit            Medication List      No changes were made to your prescriptions during this visit.          Go Cordero MD  04/19/23 2739

## 2023-04-19 NOTE — ED NOTES
Patient presents to the ed with the cc of left anterior cp x 1 year. Today he started shaking with the cp and that's what made him come in today. Pt states he might of had a fever this morning. Denies ASA, cher pta.

## 2023-04-21 LAB
QT INTERVAL: 292 MS
QT INTERVAL: 332 MS
QTC INTERVAL: 378 MS
QTC INTERVAL: 399 MS

## 2023-04-23 LAB
BACTERIA SPEC AEROBE CULT: ABNORMAL
GRAM STN SPEC: ABNORMAL
ISOLATED FROM: ABNORMAL

## 2023-04-24 ENCOUNTER — OFFICE VISIT (OUTPATIENT)
Dept: CARDIOLOGY | Facility: CLINIC | Age: 58
End: 2023-04-24
Payer: MEDICARE

## 2023-04-24 VITALS
SYSTOLIC BLOOD PRESSURE: 117 MMHG | OXYGEN SATURATION: 96 % | BODY MASS INDEX: 25.77 KG/M2 | HEART RATE: 78 BPM | DIASTOLIC BLOOD PRESSURE: 80 MMHG | WEIGHT: 174 LBS | HEIGHT: 69 IN

## 2023-04-24 DIAGNOSIS — I10 ESSENTIAL HYPERTENSION: ICD-10-CM

## 2023-04-24 DIAGNOSIS — R07.9 CHEST PAIN IN ADULT: Primary | ICD-10-CM

## 2023-04-24 DIAGNOSIS — R00.2 PALPITATIONS: ICD-10-CM

## 2023-04-24 DIAGNOSIS — E03.9 ACQUIRED HYPOTHYROIDISM: ICD-10-CM

## 2023-04-24 DIAGNOSIS — Z82.49 FAMILY HISTORY OF EARLY CAD: ICD-10-CM

## 2023-04-24 DIAGNOSIS — R06.09 DOE (DYSPNEA ON EXERTION): ICD-10-CM

## 2023-04-24 LAB — BACTERIA SPEC AEROBE CULT: NORMAL

## 2023-04-24 PROCEDURE — 3079F DIAST BP 80-89 MM HG: CPT | Performed by: INTERNAL MEDICINE

## 2023-04-24 PROCEDURE — 99204 OFFICE O/P NEW MOD 45 MIN: CPT | Performed by: INTERNAL MEDICINE

## 2023-04-24 PROCEDURE — 3074F SYST BP LT 130 MM HG: CPT | Performed by: INTERNAL MEDICINE

## 2023-04-24 NOTE — PROGRESS NOTES
Frederick Simms  [id number]  1965  58 y.o.  male    Referring Provider: Edgar Gomez Jr., MD    Reason for  Visit:  Initial visit     Subjective     Chest pain both with exertion as well as at rest.  Feels episodes of chest pain occur no more often with exertion  Moderate substernal,   Pressure like   Chest pain non pleuritic  Chest pain non positional and non gustatory   Lasts less than 5 minutes to hours    Started more than 12 months ago  Occurs once or twice a week on the average but can be variable in frequency  associated diaphoresis    Associated nausea  Radiation to his back     Relieved with rest or spontaneously  Not positional    No change with intake of food or antacids  No change with breathing  Mild to moderate associated dyspnea    No similar chest pain episodes in the past     Intermittent dizzy spells, no presyncope or syncope   Recent ER visit   Low risk stress test     Intermittent palpitations, once every several days to several weeks lasting for less than 1 minute  Associated symptoms of dizziness, weakness, chest pain,  shortness of breath    Intermittent dizzy spells, no presyncope or syncope     Strong family history of early coronary artery disease        History of present illness:  Frederick Simms is a 58 y.o. yo male with essential hypertension   who presents today for   Chief Complaint   Patient presents with   • Establish Care     FROM SELF FOR CHEST PAIN AND DIZZINESS   .    History  Past Medical History:   Diagnosis Date   • Disease of thyroid gland    • Elevated cholesterol    • GERD (gastroesophageal reflux disease)    • Hyperlipidemia    • Hypertension    • Irregular cardiac rhythm    • Lipoma    • Nephrolithiasis 6/18/2021   ,   Past Surgical History:   Procedure Laterality Date   • COLONOSCOPY N/A 6/9/2021    Procedure: COLONOSCOPY WITH ANESTHESIA;  Surgeon: Willian Bajwa DO;  Location: [facility] ENDOSCOPY;  Service: Gastroenterology;  Laterality: N/A;  preop;  abdominal pain  postop; rectal ulcer   PCP Edgar Gomez    • ENDOSCOPY N/A 2019    Procedure: ESOPHAGOGASTRODUODENOSCOPY WITH ANESTHESIA;  Surgeon: Willian Bajwa DO;  Location: [facility] ENDOSCOPY;  Service: Gastroenterology   • EXTRACORPOREAL SHOCK WAVE LITHOTRIPSY (ESWL) Right 2021    Procedure: EXTRACORPOREAL SHOCKWAVE LITHOTRIPSY RIGHT;  Surgeon: Mega Muñoz MD;  Location: [facility] OR;  Service: Urology;  Laterality: Right;   • GALLBLADDER SURGERY     • LIPOMA EXCISION     • TONSILLECTOMY     ,   Family History   Problem Relation Age of Onset   • Hypertension Mother    • Heart disease Mother    • Stroke Father    • No Known Problems Sister    • No Known Problems Brother    • No Known Problems Daughter    • No Known Problems Son    • Heart disease Maternal Grandmother    • No Known Problems Paternal Grandmother    • No Known Problems Maternal Aunt    • No Known Problems Paternal Aunt    • BRCA 1/2 Neg Hx    • Breast cancer Neg Hx    • Colon cancer Neg Hx    • Endometrial cancer Neg Hx    • Ovarian cancer Neg Hx    • Colon polyps Neg Hx    ,   Social History     Tobacco Use   • Smoking status: Former     Packs/day: 0.50     Years: 5.00     Pack years: 2.50     Types: Cigarettes     Quit date:      Years since quittin.3   • Smokeless tobacco: Never   • Tobacco comments:     34 YRS AGO   Vaping Use   • Vaping Use: Never used   Substance Use Topics   • Alcohol use: No   • Drug use: No   ,     Medications  Current Outpatient Medications   Medication Sig Dispense Refill   • amitriptyline (ELAVIL) 50 MG tablet Take 1 tablet by mouth Every Night.     • aspirin 81 MG EC tablet Take 1 tablet by mouth Daily.     • fenofibrate 160 MG tablet Take 1 tablet by mouth Every Night.     • levothyroxine (SYNTHROID, LEVOTHROID) 75 MCG tablet Take 1 tablet by mouth.     • meloxicam (Mobic) 15 MG tablet Take 1 tablet by mouth Daily. 30 tablet 2   • metoprolol succinate XL (TOPROL-XL) 50 MG 24 hr tablet Take  "1 tablet by mouth Daily.     • OLANZapine (zyPREXA) 10 MG tablet Take 1 tablet by mouth Every Night.     • omeprazole (priLOSEC) 20 MG capsule TAKE 1 CAPSULE BY MOUTH ONCE DAILY FOR REFLUX     • polyethylene glycol (MIRALAX) 17 GM/SCOOP powder MIX 17 GRAMS OF POWDER IN 6-8 OUNCES IN DRINK OF CHOICE AND TAKE ONCE DAILY FOR CONSTIPATION     • tamsulosin (FLOMAX) 0.4 MG capsule 24 hr capsule Take 1 capsule by mouth Every Night. 30 capsule 5   • tamsulosin (FLOMAX) 0.4 MG capsule 24 hr capsule Take 1 capsule by mouth 2 (Two) Times a Day. Take 1 capsule by mouth twice daily. 60 capsule 5     No current facility-administered medications for this visit.       Allergies:  Patient has no known allergies.    Review of Systems  Review of Systems   Constitutional: Negative.   HENT: Negative.    Eyes: Negative.    Cardiovascular: Positive for chest pain, dyspnea on exertion and palpitations. Negative for claudication, cyanosis, irregular heartbeat, leg swelling, near-syncope, orthopnea, paroxysmal nocturnal dyspnea and syncope.   Respiratory: Negative.    Endocrine: Negative.    Hematologic/Lymphatic: Negative.    Skin: Negative.    Gastrointestinal: Negative for anorexia.   Genitourinary: Negative.    Neurological: Positive for dizziness and light-headedness.   Psychiatric/Behavioral: Negative.        Objective     Physical Exam:  /80 (BP Location: Left arm, Patient Position: Sitting, Cuff Size: Large Adult)   Pulse 78   Ht 175.3 cm (69.02\")   Wt 78.9 kg (174 lb)   SpO2 96%   BMI 25.68 kg/m²     Physical Exam  Constitutional:       Appearance: He is well-developed.   HENT:      Head: Normocephalic.   Neck:      Vascular: Normal carotid pulses. No carotid bruit or JVD.      Trachea: No tracheal tenderness or tracheal deviation.   Cardiovascular:      Rate and Rhythm: Regular rhythm.      Pulses: Normal pulses.      Heart sounds: Normal heart sounds.   Pulmonary:      Effort: Pulmonary effort is normal.      Breath " sounds: No stridor.   Abdominal:      Palpations: Abdomen is soft.   Musculoskeletal:      Cervical back: No edema.   Skin:     General: Skin is warm.   Neurological:      Mental Status: He is alert.      Cranial Nerves: No cranial nerve deficit.      Sensory: No sensory deficit.   Psychiatric:         Speech: Speech normal.         Behavior: Behavior normal.         Results Review:     Results for orders placed during the hospital encounter of 07/11/22    Adult Stress Echo W/ Cont or Stress Agent if Necessary Per Protocol    Interpretation Summary  · Estimated left ventricular EF = 55% Left ventricular systolic function is normal.  · The patient experienced no angina during the stress test.  · Low risk stress test for stress induced myocardial ischemia       ____________________________________________________________________________________________________________________________________________  Health maintenance and recommendations    Low salt/ HTN/ Heart healthy carbohydrate restricted cardiac diet   The patient is advised to reduce or avoid caffeine or other cardiac stimulants.   Minimize or avoid  NSAID-type medications      Monitor for any signs of bleeding including red or dark stools. Fall precautions.   Advised staying uptodate with immunizations per established standard guidelines.    Offered to give patient  a copy of my notes     Questions were encouraged, asked and answered to the patient's  understanding and satisfaction. Questions if any regarding current medications and side effects, need for refills and importance of compliance to medications stressed.    Reviewed available prior notes, consults, prior visits, laboratory findings, radiology and cardiology relevant reports. Updated chart as applicable. I have reviewed the patient's medical history in detail and updated the computerized patient record as relevant.      Updated patient regarding any new or relevant abnormalities on review of records  or any new findings on physical exam. Mentioned to patient about purpose of visit and desirable health short and long term goals and objectives.    Primary to monitor CBC CMP Lipid panel and TSH as applicable    ___________________________________________________________________________________________________________________________________________   Procedures    Assessment & Plan   Diagnoses and all orders for this visit:    1. Chest pain in adult (Primary)  -     CT Angiogram Coronary; Future    2. Palpitations  -     Holter Monitor - 72 Hour Up To 15 Days; Future    3. HERMAN (dyspnea on exertion)  -     Adult Transthoracic Echo Complete w/ Color, Spectral and Contrast if necessary per protocol; Future    4. Essential hypertension    5. Acquired hypothyroidism    6. Family history of early CAD          Plan    Patient expressed understanding  Encouraged and answered all questions   Discussed with the patient and all questioned fully answered. He will call me if any problems arise.   Discussed results of prior testing with patient : stress echo   as well electrocardiogram from ER    Orders Placed This Encounter   Procedures   • CT Angiogram Coronary     Standing Status:   Future     Standing Expiration Date:   4/24/2024     Order Specific Question:   Release to patient     Answer:   Routine Release   • Holter Monitor - 72 Hour Up To 15 Days     Standing Status:   Future     Standing Expiration Date:   4/24/2024     Order Specific Question:   Reason for exam?     Answer:   Palpitations     Order Specific Question:   Reason for exam?     Answer:   Dizziness     Order Specific Question:   Release to patient     Answer:   Routine Release     Order Specific Question:   How many days is the patient to wear the monitor?     Answer:   14   • Adult Transthoracic Echo Complete w/ Color, Spectral and Contrast if necessary per protocol     Standing Status:   Future     Standing Expiration Date:   4/24/2024     Scheduling  Instructions:      Myocardial strain to be performed       Use newer echo machine     Order Specific Question:   Reason for exam?     Answer:   Dyspnea     Order Specific Question:   Release to patient     Answer:   Routine Release      Check BP and heart rates twice daily initially till blood pressures and heart rates under good control and then at least 3x / week,   If blood pressures continue to be well-controlled then can check week a month  at home and bring a recording for review next visit  If BP >130/85 or < 100/60 persistently over 3 reading 30 mins apart or if heart rates persistently above 100 bpm or less than 55 bpm call sooner for evaluation and advise            Return in about 6 weeks (around 6/5/2023).

## 2023-05-02 ENCOUNTER — TELEPHONE (OUTPATIENT)
Dept: UROLOGY | Facility: CLINIC | Age: 58
End: 2023-05-02
Payer: MEDICARE

## 2023-05-02 NOTE — TELEPHONE ENCOUNTER
Called patient to remind them to arrive at patient registration on 05/04 at 0800 for the procedure with Dr. Muñoz. Unable to leave vm with patient. Told patient if they had any questions to please contact our office at [phone].

## 2023-05-04 ENCOUNTER — ANESTHESIA EVENT (OUTPATIENT)
Dept: PERIOP | Facility: HOSPITAL | Age: 58
End: 2023-05-04
Payer: MEDICARE

## 2023-05-04 ENCOUNTER — HOSPITAL ENCOUNTER (OUTPATIENT)
Facility: HOSPITAL | Age: 58
Setting detail: HOSPITAL OUTPATIENT SURGERY
Discharge: HOME | End: 2023-05-04
Attending: UROLOGY | Admitting: UROLOGY
Payer: MEDICARE

## 2023-05-04 ENCOUNTER — ANESTHESIA (OUTPATIENT)
Dept: PERIOP | Facility: HOSPITAL | Age: 58
End: 2023-05-04
Payer: MEDICARE

## 2023-05-04 VITALS
DIASTOLIC BLOOD PRESSURE: 80 MMHG | RESPIRATION RATE: 16 BRPM | HEART RATE: 69 BPM | TEMPERATURE: 98 F | OXYGEN SATURATION: 96 % | SYSTOLIC BLOOD PRESSURE: 126 MMHG

## 2023-05-04 DIAGNOSIS — N35.013 POST-TRAUMATIC STRICTURE OF ANTERIOR URETHRA: ICD-10-CM

## 2023-05-04 PROCEDURE — 25010000002 MIDAZOLAM PER 1 MG: Performed by: ANESTHESIOLOGY

## 2023-05-04 PROCEDURE — 25010000002 PROPOFOL 200 MG/20ML EMULSION: Performed by: NURSE ANESTHETIST, CERTIFIED REGISTERED

## 2023-05-04 PROCEDURE — C1769 GUIDE WIRE: HCPCS | Performed by: UROLOGY

## 2023-05-04 PROCEDURE — C1726 CATH, BAL DIL, NON-VASCULAR: HCPCS | Performed by: UROLOGY

## 2023-05-04 PROCEDURE — 25010000002 DROPERIDOL PER 5 MG: Performed by: ANESTHESIOLOGY

## 2023-05-04 PROCEDURE — 25010000002 CEFAZOLIN PER 500 MG: Performed by: UROLOGY

## 2023-05-04 PROCEDURE — 52281 CYSTOSCOPY AND TREATMENT: CPT | Performed by: UROLOGY

## 2023-05-04 RX ORDER — MAGNESIUM HYDROXIDE 1200 MG/15ML
LIQUID ORAL AS NEEDED
Status: DISCONTINUED | OUTPATIENT
Start: 2023-05-04 | End: 2023-05-04 | Stop reason: HOSPADM

## 2023-05-04 RX ORDER — SODIUM CHLORIDE 0.9 % (FLUSH) 0.9 %
10 SYRINGE (ML) INJECTION EVERY 12 HOURS SCHEDULED
Status: DISCONTINUED | OUTPATIENT
Start: 2023-05-04 | End: 2023-05-04 | Stop reason: HOSPADM

## 2023-05-04 RX ORDER — LABETALOL HYDROCHLORIDE 5 MG/ML
5 INJECTION, SOLUTION INTRAVENOUS
Status: DISCONTINUED | OUTPATIENT
Start: 2023-05-04 | End: 2023-05-04 | Stop reason: HOSPADM

## 2023-05-04 RX ORDER — ACETAMINOPHEN 500 MG
1000 TABLET ORAL ONCE
Status: COMPLETED | OUTPATIENT
Start: 2023-05-04 | End: 2023-05-04

## 2023-05-04 RX ORDER — MIDAZOLAM HYDROCHLORIDE 1 MG/ML
1 INJECTION INTRAMUSCULAR; INTRAVENOUS
Status: DISCONTINUED | OUTPATIENT
Start: 2023-05-04 | End: 2023-05-04 | Stop reason: HOSPADM

## 2023-05-04 RX ORDER — OXYCODONE AND ACETAMINOPHEN 10; 325 MG/1; MG/1
1 TABLET ORAL ONCE AS NEEDED
Status: DISCONTINUED | OUTPATIENT
Start: 2023-05-04 | End: 2023-05-04 | Stop reason: HOSPADM

## 2023-05-04 RX ORDER — ONDANSETRON 2 MG/ML
4 INJECTION INTRAMUSCULAR; INTRAVENOUS ONCE AS NEEDED
Status: DISCONTINUED | OUTPATIENT
Start: 2023-05-04 | End: 2023-05-04 | Stop reason: HOSPADM

## 2023-05-04 RX ORDER — LIDOCAINE HYDROCHLORIDE 20 MG/ML
JELLY TOPICAL AS NEEDED
Status: DISCONTINUED | OUTPATIENT
Start: 2023-05-04 | End: 2023-05-04 | Stop reason: HOSPADM

## 2023-05-04 RX ORDER — SODIUM CHLORIDE, SODIUM LACTATE, POTASSIUM CHLORIDE, CALCIUM CHLORIDE 600; 310; 30; 20 MG/100ML; MG/100ML; MG/100ML; MG/100ML
1000 INJECTION, SOLUTION INTRAVENOUS CONTINUOUS
Status: DISCONTINUED | OUTPATIENT
Start: 2023-05-04 | End: 2023-05-04 | Stop reason: HOSPADM

## 2023-05-04 RX ORDER — HYDROMORPHONE HYDROCHLORIDE 1 MG/ML
0.5 INJECTION, SOLUTION INTRAMUSCULAR; INTRAVENOUS; SUBCUTANEOUS
Status: DISCONTINUED | OUTPATIENT
Start: 2023-05-04 | End: 2023-05-04 | Stop reason: HOSPADM

## 2023-05-04 RX ORDER — SODIUM CHLORIDE 0.9 % (FLUSH) 0.9 %
3-10 SYRINGE (ML) INJECTION AS NEEDED
Status: DISCONTINUED | OUTPATIENT
Start: 2023-05-04 | End: 2023-05-04 | Stop reason: HOSPADM

## 2023-05-04 RX ORDER — LIDOCAINE HYDROCHLORIDE 20 MG/ML
INJECTION, SOLUTION EPIDURAL; INFILTRATION; INTRACAUDAL; PERINEURAL AS NEEDED
Status: DISCONTINUED | OUTPATIENT
Start: 2023-05-04 | End: 2023-05-04 | Stop reason: SURG

## 2023-05-04 RX ORDER — FENTANYL CITRATE 50 UG/ML
25 INJECTION, SOLUTION INTRAMUSCULAR; INTRAVENOUS
Status: DISCONTINUED | OUTPATIENT
Start: 2023-05-04 | End: 2023-05-04 | Stop reason: HOSPADM

## 2023-05-04 RX ORDER — NALOXONE HCL 0.4 MG/ML
0.4 VIAL (ML) INJECTION AS NEEDED
Status: DISCONTINUED | OUTPATIENT
Start: 2023-05-04 | End: 2023-05-04 | Stop reason: HOSPADM

## 2023-05-04 RX ORDER — SODIUM CHLORIDE 0.9 % (FLUSH) 0.9 %
3 SYRINGE (ML) INJECTION EVERY 12 HOURS SCHEDULED
Status: DISCONTINUED | OUTPATIENT
Start: 2023-05-04 | End: 2023-05-04 | Stop reason: HOSPADM

## 2023-05-04 RX ORDER — SODIUM CHLORIDE 0.9 % (FLUSH) 0.9 %
3 SYRINGE (ML) INJECTION AS NEEDED
Status: DISCONTINUED | OUTPATIENT
Start: 2023-05-04 | End: 2023-05-04 | Stop reason: HOSPADM

## 2023-05-04 RX ORDER — SODIUM CHLORIDE, SODIUM LACTATE, POTASSIUM CHLORIDE, CALCIUM CHLORIDE 600; 310; 30; 20 MG/100ML; MG/100ML; MG/100ML; MG/100ML
100 INJECTION, SOLUTION INTRAVENOUS CONTINUOUS
Status: DISCONTINUED | OUTPATIENT
Start: 2023-05-04 | End: 2023-05-04 | Stop reason: HOSPADM

## 2023-05-04 RX ORDER — PHENAZOPYRIDINE HYDROCHLORIDE 100 MG/1
100 TABLET, FILM COATED ORAL 3 TIMES DAILY PRN
Qty: 21 TABLET | Refills: 1 | Status: SHIPPED | OUTPATIENT
Start: 2023-05-04

## 2023-05-04 RX ORDER — SODIUM CHLORIDE 0.9 % (FLUSH) 0.9 %
1-10 SYRINGE (ML) INJECTION AS NEEDED
Status: DISCONTINUED | OUTPATIENT
Start: 2023-05-04 | End: 2023-05-04 | Stop reason: HOSPADM

## 2023-05-04 RX ORDER — OXYCODONE AND ACETAMINOPHEN 7.5; 325 MG/1; MG/1
2 TABLET ORAL EVERY 4 HOURS PRN
Status: DISCONTINUED | OUTPATIENT
Start: 2023-05-04 | End: 2023-05-04 | Stop reason: HOSPADM

## 2023-05-04 RX ORDER — HYDROCODONE BITARTRATE AND ACETAMINOPHEN 5; 325 MG/1; MG/1
1 TABLET ORAL ONCE AS NEEDED
Status: DISCONTINUED | OUTPATIENT
Start: 2023-05-04 | End: 2023-05-04 | Stop reason: HOSPADM

## 2023-05-04 RX ORDER — LIDOCAINE HYDROCHLORIDE 10 MG/ML
0.5 INJECTION, SOLUTION EPIDURAL; INFILTRATION; INTRACAUDAL; PERINEURAL ONCE AS NEEDED
Status: DISCONTINUED | OUTPATIENT
Start: 2023-05-04 | End: 2023-05-04 | Stop reason: HOSPADM

## 2023-05-04 RX ORDER — ONDANSETRON 4 MG/1
4 TABLET, FILM COATED ORAL ONCE AS NEEDED
Status: DISCONTINUED | OUTPATIENT
Start: 2023-05-04 | End: 2023-05-04 | Stop reason: HOSPADM

## 2023-05-04 RX ORDER — PROPOFOL 10 MG/ML
INJECTION, EMULSION INTRAVENOUS AS NEEDED
Status: DISCONTINUED | OUTPATIENT
Start: 2023-05-04 | End: 2023-05-04 | Stop reason: SURG

## 2023-05-04 RX ORDER — SODIUM CHLORIDE 9 MG/ML
40 INJECTION, SOLUTION INTRAVENOUS AS NEEDED
Status: DISCONTINUED | OUTPATIENT
Start: 2023-05-04 | End: 2023-05-04 | Stop reason: HOSPADM

## 2023-05-04 RX ORDER — SODIUM CHLORIDE 9 MG/ML
100 INJECTION, SOLUTION INTRAVENOUS CONTINUOUS
Status: DISCONTINUED | OUTPATIENT
Start: 2023-05-04 | End: 2023-05-04 | Stop reason: HOSPADM

## 2023-05-04 RX ORDER — DROPERIDOL 2.5 MG/ML
0.62 INJECTION, SOLUTION INTRAMUSCULAR; INTRAVENOUS ONCE AS NEEDED
Status: COMPLETED | OUTPATIENT
Start: 2023-05-04 | End: 2023-05-04

## 2023-05-04 RX ORDER — SCOLOPAMINE TRANSDERMAL SYSTEM 1 MG/1
1 PATCH, EXTENDED RELEASE TRANSDERMAL ONCE
Status: DISCONTINUED | OUTPATIENT
Start: 2023-05-04 | End: 2023-05-04 | Stop reason: HOSPADM

## 2023-05-04 RX ORDER — MIDAZOLAM HYDROCHLORIDE 1 MG/ML
2 INJECTION INTRAMUSCULAR; INTRAVENOUS ONCE
Status: COMPLETED | OUTPATIENT
Start: 2023-05-04 | End: 2023-05-04

## 2023-05-04 RX ORDER — TRAMADOL HYDROCHLORIDE 50 MG/1
50 TABLET ORAL ONCE AS NEEDED
Status: DISCONTINUED | OUTPATIENT
Start: 2023-05-04 | End: 2023-05-04 | Stop reason: HOSPADM

## 2023-05-04 RX ORDER — FLUMAZENIL 0.1 MG/ML
0.2 INJECTION INTRAVENOUS AS NEEDED
Status: DISCONTINUED | OUTPATIENT
Start: 2023-05-04 | End: 2023-05-04 | Stop reason: HOSPADM

## 2023-05-04 RX ADMIN — MIDAZOLAM 2 MG: 1 INJECTION INTRAMUSCULAR; INTRAVENOUS at 09:04

## 2023-05-04 RX ADMIN — LIDOCAINE HYDROCHLORIDE 60 MG: 20 INJECTION, SOLUTION EPIDURAL; INFILTRATION; INTRACAUDAL; PERINEURAL at 10:06

## 2023-05-04 RX ADMIN — SCOPALAMINE 1 PATCH: 1 PATCH, EXTENDED RELEASE TRANSDERMAL at 09:01

## 2023-05-04 RX ADMIN — CEFAZOLIN 2 G: 2 INJECTION, POWDER, FOR SOLUTION INTRAMUSCULAR; INTRAVENOUS at 10:13

## 2023-05-04 RX ADMIN — ACETAMINOPHEN 1000 MG: 500 TABLET, FILM COATED ORAL at 09:02

## 2023-05-04 RX ADMIN — PROPOFOL 350 MG: 10 INJECTION, EMULSION INTRAVENOUS at 10:08

## 2023-05-04 RX ADMIN — SODIUM CHLORIDE, POTASSIUM CHLORIDE, SODIUM LACTATE AND CALCIUM CHLORIDE 1000 ML: 600; 310; 30; 20 INJECTION, SOLUTION INTRAVENOUS at 08:00

## 2023-05-04 RX ADMIN — DROPERIDOL 0.62 MG: 2.5 INJECTION, SOLUTION INTRAMUSCULAR; INTRAVENOUS at 10:49

## 2023-05-04 NOTE — OP NOTE
URETHRAL DILATATION  Procedure Note    Frederick Simms  5/4/2023    Pre-op Diagnosis:   Post-traumatic stricture of anterior urethra [N35.013]    Post-op Diagnosis:     Post-Op Diagnosis Codes:     * Post-traumatic stricture of anterior urethra [N35.013]    Procedure/CPT® Codes:      Procedure(s):  CYSTOSCOPY, BALLOON URETHRAL DILATATION    Surgeon(s):  Mega Muñoz MD    Anesthesia: General    Staff:   Circulator: Kiana Cabrera RN; Maru Fay RN  Scrub Person: Marcie Rosa; Supa Reddy; Amara Gayle    Estimated Blood Loss: minimal    Specimens:                None      Drains: * No LDAs found *    Findings: Anterior Urethral Stricture  Mild Lateral Lobe Hypertrophy of Prostate  No bladder lesions    Complications: None    Indications: 58-year-old male with significant voiding symptoms.  Cystoscopy in the office showed an anterior urethral stricture.  He was brought the operating room for cystoscopy and urethral dilation.    Description of Procedure:   After informed consent was obtained, patient was brought to the operating room where patient underwent MAC anesthesia in the supine position.  Patient was converted to the dorsolithotomy position.  Prepped and draped in the usual sterile fashion.  Timeout was done to ensure the correct patient, procedure, and site.  Patient did receive preoperative antibiotics in the holding area.    2% lidocaine jelly was placed per urethra.  23 Macanese Galeas endoscope certain urethra in retrograde fashion.  The anterior urethra showed a pinpoint stricture.  A 0.38 sensor tip wire was placed through this.  A 24 Macanese balloon dilator was then placed over the wire and this was dilated to 14 mmHg and held for a minute.  I then took this down.  I was able to place a scope past this area.  There was some narrowing in the bowl but there was no lele stricture disease.  Prostate showed lateral lobe hypertrophy of the prostate very mild.  The bladder was  entered and drained the bladder was carefully methodically inspected.  Ureteral orifices were normal in orthotopic location.  There was no mucosal lesions noted.  The bladder was then drained.  The scope was removed.  I did use Gisella sounds up to 30 Yi dilating his urethral meatus at the conclusion.  There was minimal trauma to this area and I felt was best not to place a Bartholomew catheter.  Patient was then transferred recovery in satisfactory condition.    Mega Muñoz MD     Date: 5/4/2023  Time: 10:34 CDT

## 2023-05-04 NOTE — ANESTHESIA POSTPROCEDURE EVALUATION
Patient: Frederick Simms    Procedure Summary     Date: 05/04/23 Room / Location:  [facility] OR 01 / [facility] OR    Anesthesia Start: 1003 Anesthesia Stop: 1030    Procedure: CYSTOSCOPY, BALLOON URETHRAL DILATATION (Urethra) Diagnosis:       Post-traumatic stricture of anterior urethra      (Post-traumatic stricture of anterior urethra [N35.013])    Surgeons: Mega Muñoz MD Provider: Sang Murrell CRNA    Anesthesia Type: general ASA Status: 3          Anesthesia Type: general    Vitals  Vitals Value Taken Time   /86 05/04/23 1121   Temp 97.1 °F (36.2 °C) 05/04/23 1028   Pulse 62 05/04/23 1121   Resp 14 05/04/23 1120   SpO2 98 % 05/04/23 1121   Vitals shown include unvalidated device data.        Post Anesthesia Care and Evaluation    Patient location during evaluation: PACU  Patient participation: complete - patient participated  Level of consciousness: awake and awake and alert  Pain score: 0  Pain management: adequate    Airway patency: patent  Anesthetic complications: No anesthetic complications    Cardiovascular status: acceptable and stable  Respiratory status: acceptable and unassisted  Hydration status: acceptable

## 2023-05-04 NOTE — ANESTHESIA PREPROCEDURE EVALUATION
Anesthesia Evaluation     Patient summary reviewed   no history of anesthetic complications:  NPO Solid Status: > 6 hours  NPO Liquid Status: > 6 hours           Airway   Mallampati: I  TM distance: >3 FB  Neck ROM: full  No difficulty expected  Dental - normal exam     Pulmonary    (+) a smoker Former,   (-) asthma, sleep apnea  Cardiovascular   Exercise tolerance: good (4-7 METS)    ECG reviewed    (+) hypertension, dysrhythmias Tachycardia, hyperlipidemia,   (-) cardiac stents, CABG    ROS comment: Estimated left ventricular EF = 55% Left ventricular systolic function is normal.  · The patient experienced no angina during the stress test.  · Low risk stress test for stress induced myocardial ischemia       Neuro/Psych  (+) seizures (20+ years ago),    (-) TIA, CVA  GI/Hepatic/Renal/Endo    (+)  GERD,  renal disease stones, thyroid problem hypothyroidism    Musculoskeletal     Abdominal    Substance History      OB/GYN          Other                          Anesthesia Plan    ASA 3     general     (Low risk stress test for stress induced myocardial ischemia. Pt very anxious in holding; gets tight chest; I feel he's sufficiently evaluated by cardiology to proceed today, but will not be surprised if he complains of chest pain postop. Discussed with Dr. Muñoz and Mr Murrell )  intravenous induction     Anesthetic plan, risks, benefits, and alternatives have been provided, discussed and informed consent has been obtained with: patient.

## 2023-05-21 DIAGNOSIS — R51.9 NONINTRACTABLE HEADACHE, UNSPECIFIED CHRONICITY PATTERN, UNSPECIFIED HEADACHE TYPE: ICD-10-CM

## 2023-05-22 RX ORDER — MELOXICAM 15 MG/1
TABLET ORAL
Qty: 30 TABLET | Refills: 0 | Status: SHIPPED | OUTPATIENT
Start: 2023-05-22 | End: 2023-05-24

## 2023-05-24 ENCOUNTER — HOSPITAL ENCOUNTER (OUTPATIENT)
Dept: CARDIOLOGY | Facility: HOSPITAL | Age: 58
Discharge: HOME | End: 2023-05-24
Payer: MEDICARE

## 2023-05-24 ENCOUNTER — HOSPITAL ENCOUNTER (OUTPATIENT)
Dept: CT IMAGING | Facility: HOSPITAL | Age: 58
Discharge: HOME | End: 2023-05-24
Payer: MEDICARE

## 2023-05-24 VITALS
SYSTOLIC BLOOD PRESSURE: 99 MMHG | HEIGHT: 69 IN | OXYGEN SATURATION: 100 % | HEART RATE: 61 BPM | DIASTOLIC BLOOD PRESSURE: 73 MMHG | WEIGHT: 165 LBS | RESPIRATION RATE: 16 BRPM | TEMPERATURE: 97.2 F | BODY MASS INDEX: 24.44 KG/M2

## 2023-05-24 DIAGNOSIS — R07.9 CHEST PAIN IN ADULT: ICD-10-CM

## 2023-05-24 DIAGNOSIS — R06.09 DOE (DYSPNEA ON EXERTION): ICD-10-CM

## 2023-05-24 LAB
CREAT SERPL-MCNC: 0.81 MG/DL (ref 0.76–1.27)
EGFRCR SERPLBLD CKD-EPI 2021: 103.5 ML/MIN/1.73
QT INTERVAL: 360 MS
QTC INTERVAL: 374 MS

## 2023-05-24 PROCEDURE — 75574 CT ANGIO HRT W/3D IMAGE: CPT | Performed by: INTERNAL MEDICINE

## 2023-05-24 PROCEDURE — 93306 TTE W/DOPPLER COMPLETE: CPT

## 2023-05-24 PROCEDURE — 25510000001 IOPAMIDOL PER 1 ML: Performed by: INTERNAL MEDICINE

## 2023-05-24 PROCEDURE — 93356 MYOCRD STRAIN IMG SPCKL TRCK: CPT

## 2023-05-24 PROCEDURE — 25510000001 PERFLUTREN 6.52 MG/ML SUSPENSION: Performed by: INTERNAL MEDICINE

## 2023-05-24 PROCEDURE — 82565 ASSAY OF CREATININE: CPT | Performed by: INTERNAL MEDICINE

## 2023-05-24 PROCEDURE — 75574 CT ANGIO HRT W/3D IMAGE: CPT

## 2023-05-24 PROCEDURE — 93005 ELECTROCARDIOGRAM TRACING: CPT | Performed by: INTERNAL MEDICINE

## 2023-05-24 RX ORDER — LIDOCAINE HYDROCHLORIDE 10 MG/ML
5 INJECTION, SOLUTION EPIDURAL; INFILTRATION; INTRACAUDAL; PERINEURAL AS NEEDED
Status: DISCONTINUED | OUTPATIENT
Start: 2023-05-24 | End: 2023-05-25 | Stop reason: HOSPADM

## 2023-05-24 RX ORDER — NITROGLYCERIN 0.4 MG/1
TABLET SUBLINGUAL AS NEEDED
Status: COMPLETED | OUTPATIENT
Start: 2023-05-24 | End: 2023-05-24

## 2023-05-24 RX ORDER — SODIUM CHLORIDE 0.9 % (FLUSH) 0.9 %
3 SYRINGE (ML) INJECTION EVERY 12 HOURS SCHEDULED
Status: DISCONTINUED | OUTPATIENT
Start: 2023-05-24 | End: 2023-05-25 | Stop reason: HOSPADM

## 2023-05-24 RX ORDER — METOPROLOL TARTRATE 50 MG/1
TABLET, FILM COATED ORAL
Status: COMPLETED
Start: 2023-05-24 | End: 2023-05-24

## 2023-05-24 RX ORDER — METOPROLOL TARTRATE 100 MG/1
100 TABLET ORAL ONCE AS NEEDED
Status: COMPLETED | OUTPATIENT
Start: 2023-05-24 | End: 2023-05-24

## 2023-05-24 RX ORDER — METOPROLOL TARTRATE 50 MG/1
50 TABLET, FILM COATED ORAL ONCE AS NEEDED
Status: COMPLETED | OUTPATIENT
Start: 2023-05-24 | End: 2023-05-24

## 2023-05-24 RX ORDER — SODIUM CHLORIDE 0.9 % (FLUSH) 0.9 %
10 SYRINGE (ML) INJECTION AS NEEDED
Status: DISCONTINUED | OUTPATIENT
Start: 2023-05-24 | End: 2023-05-25 | Stop reason: HOSPADM

## 2023-05-24 RX ADMIN — IOPAMIDOL 100 ML: 755 INJECTION, SOLUTION INTRAVENOUS at 10:24

## 2023-05-24 RX ADMIN — Medication 3 ML: at 09:17

## 2023-05-24 RX ADMIN — METOPROLOL TARTRATE 50 MG: 50 TABLET, FILM COATED ORAL at 09:15

## 2023-05-24 RX ADMIN — NITROGLYCERIN: 0.4 TABLET SUBLINGUAL at 10:18

## 2023-05-24 RX ADMIN — NITROGLYCERIN 0.4 MG: 0.4 TABLET SUBLINGUAL at 10:13

## 2023-05-24 RX ADMIN — PERFLUTREN 1.17 MG: 6.52 INJECTION, SUSPENSION INTRAVENOUS at 08:22

## 2023-05-27 LAB
BH CV ECHO LEFT VENTRICLE GLOBAL LONGITUDINAL STRAIN: -25.1 %
BH CV ECHO MEAS - AO MAX PG: 5.7 MMHG
BH CV ECHO MEAS - AO MEAN PG: 3 MMHG
BH CV ECHO MEAS - AO ROOT DIAM: 2.7 CM
BH CV ECHO MEAS - AO V2 MAX: 119 CM/SEC
BH CV ECHO MEAS - AO V2 VTI: 22.1 CM
BH CV ECHO MEAS - AVA(I,D): 3.3 CM2
BH CV ECHO MEAS - EDV(CUBED): 46.7 ML
BH CV ECHO MEAS - EDV(MOD-SP2): 118 ML
BH CV ECHO MEAS - EDV(MOD-SP4): 129 ML
BH CV ECHO MEAS - EF(MOD-BP): 64.7 %
BH CV ECHO MEAS - EF(MOD-SP2): 58.5 %
BH CV ECHO MEAS - EF(MOD-SP4): 71.7 %
BH CV ECHO MEAS - ESV(CUBED): 13.8 ML
BH CV ECHO MEAS - ESV(MOD-SP2): 49 ML
BH CV ECHO MEAS - ESV(MOD-SP4): 36.5 ML
BH CV ECHO MEAS - FS: 33.3 %
BH CV ECHO MEAS - IVS/LVPW: 1.1 CM
BH CV ECHO MEAS - IVSD: 1.1 CM
BH CV ECHO MEAS - LA DIMENSION: 3.2 CM
BH CV ECHO MEAS - LAT PEAK E' VEL: 8.6 CM/SEC
BH CV ECHO MEAS - LV DIASTOLIC VOL/BSA (35-75): 65.3 CM2
BH CV ECHO MEAS - LV MASS(C)D: 115.9 GRAMS
BH CV ECHO MEAS - LV MAX PG: 4.8 MMHG
BH CV ECHO MEAS - LV MEAN PG: 3 MMHG
BH CV ECHO MEAS - LV SYSTOLIC VOL/BSA (12-30): 18.5 CM2
BH CV ECHO MEAS - LV V1 MAX: 110 CM/SEC
BH CV ECHO MEAS - LV V1 VTI: 21.3 CM
BH CV ECHO MEAS - LVIDD: 3.6 CM
BH CV ECHO MEAS - LVIDS: 2.4 CM
BH CV ECHO MEAS - LVOT AREA: 3.5 CM2
BH CV ECHO MEAS - LVOT DIAM: 2.1 CM
BH CV ECHO MEAS - LVPWD: 1 CM
BH CV ECHO MEAS - MED PEAK E' VEL: 6.1 CM/SEC
BH CV ECHO MEAS - MV A MAX VEL: 48.4 CM/SEC
BH CV ECHO MEAS - MV DEC TIME: 0.21 MSEC
BH CV ECHO MEAS - MV E MAX VEL: 52 CM/SEC
BH CV ECHO MEAS - MV E/A: 1.07
BH CV ECHO MEAS - PI END-D VEL: 83.4 CM/SEC
BH CV ECHO MEAS - SI(MOD-SP2): 34.9 ML/M2
BH CV ECHO MEAS - SI(MOD-SP4): 46.8 ML/M2
BH CV ECHO MEAS - SV(LVOT): 73.8 ML
BH CV ECHO MEAS - SV(MOD-SP2): 69 ML
BH CV ECHO MEAS - SV(MOD-SP4): 92.5 ML
BH CV ECHO MEAS - TR MAX PG: 18.5 MMHG
BH CV ECHO MEAS - TR MAX VEL: 215 CM/SEC
BH CV ECHO MEASUREMENTS AVERAGE E/E' RATIO: 7.07
LEFT ATRIUM VOLUME INDEX: 18.2 ML/M2
LEFT ATRIUM VOLUME: 36 ML

## 2023-06-01 NOTE — PROGRESS NOTES
Subjective    Mr. Simms is 58 y.o. male    Chief Complaint: Post Op Urethral Dilation     History of Present Illness  Patient is status post dilation of anterior urethral stricture 5/4/2023.  He has done well postoperatively.  AUA symptom score 2/35 with nocturia x2.  Denies any other voiding symptoms.  He is maintained on tamsulosin.  Cystoscopy did showed mild lateral lobe hypertrophy of the prostate.  No postoperative issues.    The following portions of the patient's history were reviewed and updated as appropriate: allergies, current medications, past family history, past medical history, past social history, past surgical history and problem list.    Review of Systems      Current Outpatient Medications:     amitriptyline (ELAVIL) 50 MG tablet, Take 1 tablet by mouth Every Night., Disp: , Rfl:     aspirin 81 MG EC tablet, Take 1 tablet by mouth Daily., Disp: , Rfl:     fenofibrate 160 MG tablet, Take 1 tablet by mouth Every Night., Disp: , Rfl:     levothyroxine (SYNTHROID, LEVOTHROID) 75 MCG tablet, Take 1 tablet by mouth., Disp: , Rfl:     metoprolol succinate XL (TOPROL-XL) 50 MG 24 hr tablet, Take 1 tablet by mouth Daily., Disp: , Rfl:     OLANZapine (zyPREXA) 10 MG tablet, Take 1 tablet by mouth Every Night., Disp: , Rfl:     omeprazole (priLOSEC) 20 MG capsule, TAKE 1 CAPSULE BY MOUTH ONCE DAILY FOR REFLUX, Disp: , Rfl:     phenazopyridine (PYRIDIUM) 100 MG tablet, Take 1 tablet by mouth 3 (Three) Times a Day As Needed for Bladder Spasms., Disp: 21 tablet, Rfl: 1    polyethylene glycol (MIRALAX) 17 GM/SCOOP powder, MIX 17 GRAMS OF POWDER IN 6-8 OUNCES IN DRINK OF CHOICE AND TAKE ONCE DAILY FOR CONSTIPATION, Disp: , Rfl:     Past Medical History:   Diagnosis Date    Disease of thyroid gland     Elevated cholesterol     GERD (gastroesophageal reflux disease)     Hyperlipidemia     Hypertension     Irregular cardiac rhythm     Lipoma     Nephrolithiasis 6/18/2021       Past Surgical History:   Procedure  "Laterality Date    COLONOSCOPY N/A 2021    Procedure: COLONOSCOPY WITH ANESTHESIA;  Surgeon: Willian Bajwa DO;  Location:  [facility] ENDOSCOPY;  Service: Gastroenterology;  Laterality: N/A;  preop; abdominal pain  postop; rectal ulcer   PCP Edgar Gomez     ENDOSCOPY N/A 2019    Procedure: ESOPHAGOGASTRODUODENOSCOPY WITH ANESTHESIA;  Surgeon: Willian Bajwa DO;  Location:  [facility] ENDOSCOPY;  Service: Gastroenterology    EXTRACORPOREAL SHOCK WAVE LITHOTRIPSY (ESWL) Right 2021    Procedure: EXTRACORPOREAL SHOCKWAVE LITHOTRIPSY RIGHT;  Surgeon: Mega Muñoz MD;  Location: [facility] OR;  Service: Urology;  Laterality: Right;    GALLBLADDER SURGERY      LIPOMA EXCISION      TONSILLECTOMY      URETHRAL DILATATION N/A 2023    Procedure: CYSTOSCOPY, BALLOON URETHRAL DILATATION;  Surgeon: Mega Muñoz MD;  Location:  [facility] OR;  Service: Urology;  Laterality: N/A;       Social History     Socioeconomic History    Marital status: Single   Tobacco Use    Smoking status: Former     Packs/day: 0.50     Years: 5.00     Pack years: 2.50     Types: Cigarettes     Quit date:      Years since quittin.4    Smokeless tobacco: Never    Tobacco comments:     34 YRS AGO   Vaping Use    Vaping Use: Never used   Substance and Sexual Activity    Alcohol use: No    Drug use: No    Sexual activity: Defer       Family History   Problem Relation Age of Onset    Hypertension Mother     Heart disease Mother     Stroke Father     No Known Problems Sister     No Known Problems Brother     No Known Problems Daughter     No Known Problems Son     Heart disease Maternal Grandmother     No Known Problems Paternal Grandmother     No Known Problems Maternal Aunt     No Known Problems Paternal Aunt     BRCA 1/2 Neg Hx     Breast cancer Neg Hx     Colon cancer Neg Hx     Endometrial cancer Neg Hx     Ovarian cancer Neg Hx     Colon polyps Neg Hx        Objective    Temp 97.6 °F (36.4 °C)   Ht 175.3 cm (69\")   " Wt 76 kg (167 lb 9.6 oz)   BMI 24.75 kg/m²     Physical Exam        Results for orders placed or performed in visit on 06/14/23   POC Urinalysis Dipstick, Multipro    Specimen: Urine   Result Value Ref Range    Color Yellow Yellow, Straw, Dark Yellow, Anai    Clarity, UA Clear Clear    Glucose, UA Negative Negative mg/dL    Bilirubin Negative Negative    Ketones, UA Negative Negative    Specific Gravity  1.005 1.005 - 1.030    Blood, UA Negative Negative    pH, Urine 5.5 5.0 - 8.0    Protein, POC Negative Negative mg/dL    Urobilinogen, UA 0.2 E.U./dL Normal, 0.2 E.U./dL    Nitrite, UA Negative Negative    Leukocytes Small (1+) (A) Negative     IPSS Questionnaire (AUA-7):  Incomplete emptying  Over the past month, how often have you had a sensation of not emptying your bladder completely after you finish?: Not at all (06/14/23 1316)  Frequency  Over the past month, how often have you had to urinate again less than two hours after you finishing urinating ?: Not at all (06/14/23 1316)  Intermittency  Over the past month, how often have you found you stopped and started again several time when you urinated ?: Not at all (06/14/23 1316)  Urgency  Over the last month, how difficult  have you found it to postpone urination ?: Not at all (06/14/23 1316)  Weak Stream  Over the past month, how often have you had a weak urinary stream ?: Not at all (06/14/23 1316)  Straining  Over the past month, how often have you had to push or strain to begin urination ?: Not at all (06/14/23 1316)  Nocturia  Over the past month, how many times did you most typically get up to urinate from the time you went to bed until the time you got up in the morning ?: Less than half the time (06/14/23 1316)  Quality of life due to urinary symptoms  If you were to spend the rest of your life with your urinary condition the way it is now, how would feel about that?: Pleased (06/14/23 1316)    Scores  Total IPSS Score: (!) 2 (06/14/23 1316)  Total  Score = Symtomatic Level: Mildly symptomatic: 0-7 (06/14/23 1316)        Estimation of residual urine via abdominal ultrasound  Residual Urine: 0 ml  Indication: Post Op Dilation   Position: Supine  Examination: Incremental scanning of the suprapubic area using 3 MHz transducer using copious amounts of acoustic gel.   Findings: An anechoic area was demonstrated which represented the bladder, with measurement of residual urine as noted. I inspected this myself. In that the residual urine was stable or insignificant, no treatment will be necessary at this time.       Assessment and Plan    Diagnoses and all orders for this visit:    1. Anterior urethral stricture (Primary)  -     POC Urinalysis Dipstick, Multipro    2. BPH with urinary obstruction    3. History of nephrolithiasis          I discussed recurrence rates of urethral stricture disease and further management.  We will have him return in 6 months for symptom check.  Continue tamsulosin.

## 2023-06-14 ENCOUNTER — OFFICE VISIT (OUTPATIENT)
Dept: UROLOGY | Facility: CLINIC | Age: 58
End: 2023-06-14
Payer: MEDICARE

## 2023-06-14 VITALS — HEIGHT: 69 IN | TEMPERATURE: 97.6 F | BODY MASS INDEX: 24.82 KG/M2 | WEIGHT: 167.6 LBS

## 2023-06-14 DIAGNOSIS — N13.8 BPH WITH URINARY OBSTRUCTION: ICD-10-CM

## 2023-06-14 DIAGNOSIS — N35.914 ANTERIOR URETHRAL STRICTURE: Primary | ICD-10-CM

## 2023-06-14 DIAGNOSIS — Z87.442 HISTORY OF NEPHROLITHIASIS: ICD-10-CM

## 2023-06-14 DIAGNOSIS — N40.1 BPH WITH URINARY OBSTRUCTION: ICD-10-CM

## 2023-06-14 LAB
BILIRUB BLD-MCNC: NEGATIVE MG/DL
CLARITY, POC: CLEAR
COLOR UR: YELLOW
GLUCOSE UR STRIP-MCNC: NEGATIVE MG/DL
KETONES UR QL: NEGATIVE
LEUKOCYTE EST, POC: ABNORMAL
NITRITE UR-MCNC: NEGATIVE MG/ML
PH UR: 5.5 [PH] (ref 5–8)
PROT UR STRIP-MCNC: NEGATIVE MG/DL
RBC # UR STRIP: NEGATIVE /UL
SP GR UR: 1 (ref 1–1.03)
UROBILINOGEN UR QL: ABNORMAL

## 2023-06-14 PROCEDURE — 1159F MED LIST DOCD IN RCRD: CPT | Performed by: UROLOGY

## 2023-06-14 PROCEDURE — 1160F RVW MEDS BY RX/DR IN RCRD: CPT | Performed by: UROLOGY

## 2023-06-14 PROCEDURE — 81003 URINALYSIS AUTO W/O SCOPE: CPT | Performed by: UROLOGY

## 2023-06-14 PROCEDURE — 99213 OFFICE O/P EST LOW 20 MIN: CPT | Performed by: UROLOGY

## 2023-06-15 ENCOUNTER — TELEPHONE (OUTPATIENT)
Dept: FAMILY MEDICINE CLINIC | Facility: CLINIC | Age: 58
End: 2023-06-15

## 2023-06-15 ENCOUNTER — OFFICE VISIT (OUTPATIENT)
Dept: CARDIOLOGY | Facility: CLINIC | Age: 58
End: 2023-06-15
Payer: MEDICARE

## 2023-06-15 ENCOUNTER — OFFICE VISIT (OUTPATIENT)
Dept: FAMILY MEDICINE CLINIC | Facility: CLINIC | Age: 58
End: 2023-06-15
Payer: MEDICARE

## 2023-06-15 VITALS
DIASTOLIC BLOOD PRESSURE: 75 MMHG | WEIGHT: 170 LBS | SYSTOLIC BLOOD PRESSURE: 125 MMHG | HEIGHT: 69 IN | BODY MASS INDEX: 25.18 KG/M2 | HEART RATE: 86 BPM

## 2023-06-15 VITALS
WEIGHT: 169 LBS | HEART RATE: 80 BPM | BODY MASS INDEX: 25.03 KG/M2 | HEIGHT: 69 IN | SYSTOLIC BLOOD PRESSURE: 114 MMHG | DIASTOLIC BLOOD PRESSURE: 76 MMHG | TEMPERATURE: 98 F | RESPIRATION RATE: 20 BRPM | OXYGEN SATURATION: 98 %

## 2023-06-15 DIAGNOSIS — I47.1 PAROXYSMAL SVT (SUPRAVENTRICULAR TACHYCARDIA): Primary | ICD-10-CM

## 2023-06-15 DIAGNOSIS — L60.0 INGROWN TOENAIL OF LEFT FOOT: Primary | ICD-10-CM

## 2023-06-15 DIAGNOSIS — Z82.49 FAMILY HISTORY OF EARLY CAD: ICD-10-CM

## 2023-06-15 DIAGNOSIS — I25.10 ATHEROSCLEROSIS OF NATIVE CORONARY ARTERY OF NATIVE HEART WITHOUT ANGINA PECTORIS: ICD-10-CM

## 2023-06-15 DIAGNOSIS — I10 ESSENTIAL HYPERTENSION: ICD-10-CM

## 2023-06-15 DIAGNOSIS — E03.9 ACQUIRED HYPOTHYROIDISM: ICD-10-CM

## 2023-06-15 PROCEDURE — 3078F DIAST BP <80 MM HG: CPT | Performed by: NURSE PRACTITIONER

## 2023-06-15 PROCEDURE — 1159F MED LIST DOCD IN RCRD: CPT | Performed by: NURSE PRACTITIONER

## 2023-06-15 PROCEDURE — 3074F SYST BP LT 130 MM HG: CPT | Performed by: NURSE PRACTITIONER

## 2023-06-15 PROCEDURE — 99214 OFFICE O/P EST MOD 30 MIN: CPT | Performed by: NURSE PRACTITIONER

## 2023-06-15 PROCEDURE — 1160F RVW MEDS BY RX/DR IN RCRD: CPT | Performed by: NURSE PRACTITIONER

## 2023-06-15 RX ORDER — METOPROLOL SUCCINATE 50 MG/1
75 TABLET, EXTENDED RELEASE ORAL DAILY
Qty: 45 TABLET | Refills: 11 | Status: SHIPPED | OUTPATIENT
Start: 2023-06-15

## 2023-06-15 RX ORDER — CEPHALEXIN 500 MG/1
500 CAPSULE ORAL 2 TIMES DAILY
Qty: 20 CAPSULE | Refills: 0 | Status: SHIPPED | OUTPATIENT
Start: 2023-06-15

## 2023-06-15 RX ORDER — AMITRIPTYLINE HYDROCHLORIDE 50 MG/1
50 TABLET, FILM COATED ORAL NIGHTLY
Qty: 30 TABLET | Refills: 2 | Status: SHIPPED | OUTPATIENT
Start: 2023-06-15

## 2023-06-15 NOTE — PROGRESS NOTES
Subjective:     Encounter Date: 06/15/2023      Patient ID: Frederick Simms is a 58 y.o. male with hypertension, hypothyroidism, family history of CAD.    Chief Complaint: follow up  Hypertension  This is a chronic problem. The current episode started more than 1 year ago. The problem is controlled. Associated symptoms include chest pain and palpitations. Pertinent negatives include no malaise/fatigue, orthopnea, peripheral edema, PND or shortness of breath. Risk factors for coronary artery disease include dyslipidemia and male gender. Current antihypertension treatment includes beta blockers.     Patient presents today for management of chest pain. Patient was seen by Dr Estrella on 4/24/2023 for evaluation of chest pain and dizziness. He wore a holter monitor taht showed 9 runs of SVT longest episode was 16 beats and fastest episode of at a rate of 193 bpm. Echo was done that showed LVEF 61-65% and no significant valvular disease. He underwent a CTA that showed Calicium score of 17. Normal LAD with mild focal stenosis of 30-40%, normal RCA, and no significant disease of left circumflex, right posterior lateral artery, diagonal branch and right posterior descending coronary artery.   Today he reports that he has continued to have chest pain. He reports that he has chest pain everyday. Some days it lasts all day. He reports that other times it is present, will go away and then come back again. Family reports that he has had a lot of testing for it, it has been going on a long time. He reports that he has some dyspnea on exertion present but it takes moderate exertion to produce. He denies any heart racing. He reports that he feels some skipped beats, he remembers two episodes while having the holter monitor. He denies any leg swelling. He reports some hand swelling at times. He denies any orthopnea or PND. He reports dizziness often. He reports that it does occur with position change. He reports that his BP has been  running 120-130/70-80. Patient follows with Dr Goemz as PCP.     The following portions of the patient's history were reviewed and updated as appropriate: allergies, current medications, past family history, past medical history, past social history, past surgical history and problem list.    No Known Allergies    Current Outpatient Medications:     aspirin 81 MG EC tablet, Take 1 tablet by mouth Daily., Disp: , Rfl:     fenofibrate 160 MG tablet, Take 1 tablet by mouth Every Night., Disp: , Rfl:     levothyroxine (SYNTHROID, LEVOTHROID) 75 MCG tablet, Take 1 tablet by mouth., Disp: , Rfl:     metoprolol succinate XL (TOPROL-XL) 50 MG 24 hr tablet, Take 1.5 tablets by mouth Daily., Disp: 45 tablet, Rfl: 11    OLANZapine (zyPREXA) 10 MG tablet, Take 1 tablet by mouth Every Night., Disp: , Rfl:     omeprazole (priLOSEC) 20 MG capsule, TAKE 1 CAPSULE BY MOUTH ONCE DAILY FOR REFLUX, Disp: , Rfl:     phenazopyridine (PYRIDIUM) 100 MG tablet, Take 1 tablet by mouth 3 (Three) Times a Day As Needed for Bladder Spasms., Disp: 21 tablet, Rfl: 1    polyethylene glycol (MIRALAX) 17 GM/SCOOP powder, MIX 17 GRAMS OF POWDER IN 6-8 OUNCES IN DRINK OF CHOICE AND TAKE ONCE DAILY FOR CONSTIPATION, Disp: , Rfl:     amitriptyline (ELAVIL) 50 MG tablet, Take 1 tablet by mouth Every Night., Disp: 30 tablet, Rfl: 2  Past Medical History:   Diagnosis Date    Disease of thyroid gland     Elevated cholesterol     GERD (gastroesophageal reflux disease)     Hyperlipidemia     Hypertension     Irregular cardiac rhythm     Lipoma     Nephrolithiasis 2021     Social History     Socioeconomic History    Marital status: Single   Tobacco Use    Smoking status: Former     Packs/day: 0.50     Years: 5.00     Pack years: 2.50     Types: Cigarettes     Quit date:      Years since quittin.4    Smokeless tobacco: Never    Tobacco comments:     34 YRS AGO   Vaping Use    Vaping Use: Never used   Substance and Sexual Activity    Alcohol use: No  "   Drug use: No    Sexual activity: Defer       Review of Systems   Constitutional: Negative for malaise/fatigue.   HENT:  Negative for nosebleeds.    Cardiovascular:  Positive for chest pain and palpitations. Negative for dyspnea on exertion, irregular heartbeat, leg swelling, near-syncope, orthopnea, paroxysmal nocturnal dyspnea and syncope.   Respiratory:  Negative for shortness of breath.    Hematologic/Lymphatic: Does not bruise/bleed easily.   Genitourinary:  Negative for hematuria.   Neurological:  Positive for dizziness. Negative for weakness.   All other systems reviewed and are negative.       Objective:     Vitals reviewed.   Constitutional:       General: Not in acute distress.     Appearance: Normal appearance. Well-developed.   Eyes:      Pupils: Pupils are equal, round, and reactive to light.   HENT:      Head: Normocephalic and atraumatic.      Nose: Nose normal.   Neck:      Vascular: No carotid bruit.   Pulmonary:      Effort: Pulmonary effort is normal. No respiratory distress.      Breath sounds: Normal breath sounds. No wheezing. No rales.   Cardiovascular:      Normal rate. Regular rhythm.      Murmurs: There is no murmur.   Edema:     Peripheral edema absent.   Abdominal:      General: There is no distension.      Palpations: Abdomen is soft.   Musculoskeletal: Normal range of motion.      Cervical back: Normal range of motion and neck supple. Skin:     General: Skin is warm.      Findings: No erythema or rash.   Neurological:      General: No focal deficit present.      Mental Status: Alert and oriented to person, place, and time.   Psychiatric:         Attention and Perception: Attention normal.         Mood and Affect: Mood normal.         Speech: Speech normal.         Behavior: Behavior normal.         Thought Content: Thought content normal.         Judgment: Judgment normal.       /75   Pulse 86   Ht 175.3 cm (69\")   Wt 77.1 kg (170 lb)   BMI 25.10 kg/m²     Procedures    Lab " Review:       Holter monitor 4/24/2023:  Conclusion:   Baseline rhythm was sinus.  No significant ectopy   9 runs of supraventricular tachycardia longest 16 beats at an average rate of 145 bpm  Most rapid SVT episode was 6 beats at a maximum rate of 193 bpm and average rate of 169 bpm at 4:01 PM    Results for orders placed during the hospital encounter of 05/24/23    Adult Transthoracic Echo Complete w/ Color, Spectral and Contrast if necessary per protocol    Interpretation Summary    Left ventricular systolic function is normal. Left ventricular ejection fraction appears to be 61 - 65%.    Left ventricular diastolic function was normal.    Estimated right ventricular systolic pressure from tricuspid regurgitation is normal (<35 mmHg).    Normal global longitudinal LV strain (GLS) = -25.1%.    Lab Results   Component Value Date    CHOL 170 11/27/2017    CHLPL 146 (L) 06/14/2018    TRIG 96 06/14/2018    HDL 47 (L) 06/14/2018    LDL 80 06/14/2018     I have personally reviewed labs, holter monitor, echo, CTA coronary and past office notes prior to patients visit  Assessment:          Diagnosis Plan   1. Paroxysmal SVT (supraventricular tachycardia)        2. Atherosclerosis of native coronary artery of native heart without angina pectoris        3. Essential hypertension        4. Acquired hypothyroidism        5. Family history of early CAD               Plan:       Paroxysmal SVT: Noted on recent holter monitor 4/2023. Will increase metoprolol to 75 mg daily to help with palpitations.     2. Arthrosclerosis of coronary arteries: Discussed CTA coronary results with patient and mother in office. Normal LAD with mild focal stenosis of 30-40%, normal RCA, and no significant disease of left circumflex, right posterior lateral artery, diagonal branch and right posterior descending coronary artery. Chest pain sounds atypical. Recommend to continue aspirin and follow up with PCP for other etiologies.     3. Hypertension:  well controlled. Continue current medications.     4. Hypothyroidism: managed and followed by PCP    5. Family history of early CAD    I spent 36 minutes caring for Frederick on this date of service. This time includes time spent by me in the following activities:preparing for the visit, reviewing tests, obtaining and/or reviewing a separately obtained history, performing a medically appropriate examination and/or evaluation , counseling and educating the patient/family/caregiver, and documenting information in the medical record     Patient is to follow up in 6 months or sooner if needed

## 2023-06-15 NOTE — TELEPHONE ENCOUNTER
Pt's mother contacted office and spoke with this nurse. Pt's mother requesting antibiotic that was addressed in office today. Spoke with provider. Medication sent to pharmacy.     Contacted pt's mother (Cesia on verbal release), verified pt's name and . Informed that provider has sent in the antibiotic into [facility] pharmacy. Vu of above and thanked staff.

## 2023-06-15 NOTE — PROGRESS NOTES
"Chief Complaint  infected toe    Subjective    History of Present Illness      Patient presents to [facility] for   History of Present Illness  Pt is here today due to an injured and infected toenail.  Pt reports he was picking at this toenail earlier this week and tore it down too low.  No fever reported or detected.     Review of Systems    I have reviewed and agree with the HPI information as above.  Shirley Haines, APRN     Objective   Vital Signs:   /76   Pulse 80   Temp 98 °F (36.7 °C)   Resp 20   Ht 175.3 cm (69\")   Wt 76.7 kg (169 lb)   SpO2 98%   BMI 24.96 kg/m²     BMI is within normal parameters. No other follow-up for BMI required.      Physical Exam  Musculoskeletal:        Feet:         MARIA DEL CARMEN-7: Over the last two weeks, how often have you been bothered by the following problems?  Feeling nervous, anxious or on edge: Not at all  Not being able to stop or control worrying: Not at all  Worrying too much about different things: Not at all  Trouble Relaxing: Not at all  Being so restless that it is hard to sit still: Not at all  Becoming easily annoyed or irritable: Not at all  Feeling afraid as if something awful might happen: Not at all  MARIA DEL CARMEN 7 Total Score: 0  If you checked any problems, how difficult have these problems made it for you to do your work, take care of things at home, or get along with other people: Not difficult at all    PHQ-2 Depression Screening  Little interest or pleasure in doing things? 0-->not at all   Feeling down, depressed, or hopeless? 0-->not at all   PHQ-2 Total Score 0     PHQ-9 Depression Screening  Little interest or pleasure in doing things? 0-->not at all   Feeling down, depressed, or hopeless? 0-->not at all   Trouble falling or staying asleep, or sleeping too much?     Feeling tired or having little energy?     Poor appetite or overeating?     Feeling bad about yourself - or that you are a failure or have let yourself or " your family down?     Trouble concentrating on things, such as reading the newspaper or watching television?     Moving or speaking so slowly that other people could have noticed? Or the opposite - being so fidgety or restless that you have been moving around a lot more than usual?     Thoughts that you would be better off dead, or of hurting yourself in some way?     PHQ-9 Total Score 0   If you checked off any problems, how difficult have these problems made it for you to do your work, take care of things at home, or get along with other people?        Result Review  Data Reviewed:                   Assessment and Plan      Diagnoses and all orders for this visit:    1. Ingrown toenail of left foot (Primary)  -     cephalexin (Keflex) 500 MG capsule; Take 1 capsule by mouth 2 (Two) Times a Day.  Dispense: 20 capsule; Refill: 0    Other orders  -     amitriptyline (ELAVIL) 50 MG tablet; Take 1 tablet by mouth Every Night.  Dispense: 30 tablet; Refill: 2      Patient picked his toenails short and now the left great toe on the lateral side is infected.  I did offer to try and remove the ingrown toenail.  However the patient and mom have opted out at this time.  Wishes to try antibiotics instead.  Patient is also needing his Elavil refilled      Follow Up   Return if symptoms worsen or fail to improve.  Patient was given instructions and counseling regarding his condition or for health maintenance advice. Please see specific information pulled into the AVS if appropriate.

## 2023-06-15 NOTE — TELEPHONE ENCOUNTER
Caller:  CINDY SANCHEZ    Relationship:  PHARMACY    Best call back number:  [phone]     What is the best time to reach you:  ANYTIME    Who are you requesting to speak with:  CLINICAL    What was the call regarding:  [facility] ASKED IF ANTIBIOTIC WAS SUPPOSED TO HAVE BEEN CALLED IN.

## 2023-08-08 ENCOUNTER — OFFICE VISIT (OUTPATIENT)
Dept: FAMILY MEDICINE CLINIC | Facility: CLINIC | Age: 58
End: 2023-08-08
Payer: MEDICARE

## 2023-08-08 VITALS
BODY MASS INDEX: 24.88 KG/M2 | WEIGHT: 168 LBS | OXYGEN SATURATION: 98 % | DIASTOLIC BLOOD PRESSURE: 80 MMHG | HEART RATE: 77 BPM | TEMPERATURE: 98 F | HEIGHT: 69 IN | SYSTOLIC BLOOD PRESSURE: 122 MMHG

## 2023-08-08 DIAGNOSIS — L60.0 INGROWN LEFT GREATER TOENAIL: Primary | ICD-10-CM

## 2023-08-08 DIAGNOSIS — R51.9 NONINTRACTABLE HEADACHE, UNSPECIFIED CHRONICITY PATTERN, UNSPECIFIED HEADACHE TYPE: ICD-10-CM

## 2023-08-08 PROCEDURE — 99213 OFFICE O/P EST LOW 20 MIN: CPT | Performed by: NURSE PRACTITIONER

## 2023-08-08 PROCEDURE — 3079F DIAST BP 80-89 MM HG: CPT | Performed by: NURSE PRACTITIONER

## 2023-08-08 PROCEDURE — 1160F RVW MEDS BY RX/DR IN RCRD: CPT | Performed by: NURSE PRACTITIONER

## 2023-08-08 PROCEDURE — 3074F SYST BP LT 130 MM HG: CPT | Performed by: NURSE PRACTITIONER

## 2023-08-08 PROCEDURE — 1159F MED LIST DOCD IN RCRD: CPT | Performed by: NURSE PRACTITIONER

## 2023-08-08 RX ORDER — CEPHALEXIN 500 MG/1
500 CAPSULE ORAL 2 TIMES DAILY
Qty: 20 CAPSULE | Refills: 0 | Status: SHIPPED | OUTPATIENT
Start: 2023-08-08

## 2023-08-08 RX ORDER — MELOXICAM 15 MG/1
15 TABLET ORAL DAILY
Qty: 30 TABLET | Refills: 2 | Status: SHIPPED | OUTPATIENT
Start: 2023-08-08

## 2023-08-08 NOTE — PROGRESS NOTES
"Chief Complaint  Ingrown toenail of left foot    Subjective    History of Present Illness {CC  Problem List  Visit Diagnosis   Encounters  Notes  Medications  Labs  Result Review Imaging  Media :23}     Patient presents to [facility] for   History of Present Illness     Review of Systems    I have reviewed and agree with the {[facility] ROS Review:02147} information as above.  Melanie Dent MA     Objective   Vital Signs:   /80   Pulse 77   Temp 98 øF (36.7 øC)   Ht 175.3 cm (69\")   Wt 76.2 kg (168 lb)   SpO2 98%   BMI 24.81 kg/mý     BMI is within normal parameters. No other follow-up for BMI required.      Physical Exam     MARIA DEL CARMEN-7: Over the last two weeks, how often have you been bothered by the following problems?  Feeling nervous, anxious or on edge: Not at all  Not being able to stop or control worrying: Not at all  Worrying too much about different things: Not at all  Trouble Relaxing: Not at all  Being so restless that it is hard to sit still: Several days  Becoming easily annoyed or irritable: Not at all  Feeling afraid as if something awful might happen: Not at all  MARIA DEL CARMEN 7 Total Score: 1  If you checked any problems, how difficult have these problems made it for you to do your work, take care of things at home, or get along with other people: Not difficult at all    PHQ-2 Depression Screening  Little interest or pleasure in doing things? 0-->not at all   Feeling down, depressed, or hopeless? 0-->not at all   PHQ-2 Total Score 0     PHQ-9 Depression Screening  Little interest or pleasure in doing things? 0-->not at all   Feeling down, depressed, or hopeless? 0-->not at all   Trouble falling or staying asleep, or sleeping too much?     Feeling tired or having little energy?     Poor appetite or overeating?     Feeling bad about yourself - or that you are a failure or have let yourself or your family down?     Trouble concentrating on things, such as reading the " newspaper or watching television?     Moving or speaking so slowly that other people could have noticed? Or the opposite - being so fidgety or restless that you have been moving around a lot more than usual?     Thoughts that you would be better off dead, or of hurting yourself in some way?     PHQ-9 Total Score 0   If you checked off any problems, how difficult have these problems made it for you to do your work, take care of things at home, or get along with other people?        Result Review  Data Reviewed:{ Labs  Result Review  Imaging  Med Tab  Media :23}   {The following data was reviewed by (Optional):61774}  {Ambulatory Labs (Optional):97651}  {Data reviewed (Optional):52797:::1}            Assessment and Plan {CC Problem List  Visit Diagnosis  ROS  Review (Popup)  Health Maintenance  Quality  BestPractice  Medications  SmartSets  SnapShot Encounters  Media :23}     There are no diagnoses linked to this encounter.    {Time Spent (Optional):23135}    Follow Up {Instructions Charge Capture  Follow-up Communications :23}  No follow-ups on file.  Patient was given instructions and counseling regarding his condition or for health maintenance advice. Please see specific information pulled into the AVS if appropriate.

## 2023-08-08 NOTE — PROGRESS NOTES
"Chief Complaint  Ingrown toenail of left foot    Subjective    History of Present Illness      Patient presents to [facility] for   History of Present Illness     Review of Systems   Constitutional: Negative.    HENT: Negative.     Eyes: Negative.    Respiratory: Negative.     Cardiovascular: Negative.    Gastrointestinal: Negative.    Endocrine: Negative.    Genitourinary: Negative.    Musculoskeletal: Negative.    Skin: Negative.    Allergic/Immunologic: Negative.    Neurological: Negative.    Hematological: Negative.    Psychiatric/Behavioral: Negative.       I have reviewed and agree with the HPI and ROS information as above.  Shirley Hillman, APRN     Objective   Vital Signs:   /80   Pulse 77   Temp 98 øF (36.7 øC)   Ht 175.3 cm (69\")   Wt 76.2 kg (168 lb)   SpO2 98%   BMI 24.81 kg/mý     BMI is within normal parameters. No other follow-up for BMI required.      Physical Exam  Constitutional:       Appearance: Normal appearance. He is well-developed.   HENT:      Head: Normocephalic and atraumatic.      Right Ear: External ear normal.      Left Ear: External ear normal.      Nose: Nose normal. No nasal tenderness or congestion.      Mouth/Throat:      Lips: Pink. No lesions.      Mouth: Mucous membranes are moist. No oral lesions.      Dentition: Normal dentition.      Pharynx: Oropharynx is clear. No pharyngeal swelling, oropharyngeal exudate or posterior oropharyngeal erythema.   Eyes:      General: Lids are normal. Vision grossly intact. No scleral icterus.        Right eye: No discharge.         Left eye: No discharge.      Extraocular Movements: Extraocular movements intact.      Conjunctiva/sclera: Conjunctivae normal.      Right eye: Right conjunctiva is not injected.      Left eye: Left conjunctiva is not injected.      Pupils: Pupils are equal, round, and reactive to light.   Cardiovascular:      Rate and Rhythm: Normal rate and regular rhythm.      Heart " sounds: Normal heart sounds. No murmur heard.    No gallop.   Pulmonary:      Effort: Pulmonary effort is normal.      Breath sounds: Normal breath sounds and air entry. No wheezing, rhonchi or rales.   Musculoskeletal:         General: No tenderness or deformity. Normal range of motion.      Cervical back: Full passive range of motion without pain, normal range of motion and neck supple.      Right lower leg: No edema.      Left lower leg: No edema.   Feet:      Comments: Left great toe ingrown toenail, erythema and drainage noted.   Skin:     General: Skin is warm and dry.      Coloration: Skin is not jaundiced.      Findings: No rash.   Neurological:      Mental Status: He is alert and oriented to person, place, and time.      Sensory: Sensation is intact.      Motor: Motor function is intact.      Coordination: Coordination is intact.      Gait: Gait is intact.   Psychiatric:         Attention and Perception: Attention normal.         Mood and Affect: Mood and affect normal.         Behavior: Behavior is not hyperactive. Behavior is cooperative.         Thought Content: Thought content normal.         Judgment: Judgment normal.        MARIA DEL CARMEN-7: Over the last two weeks, how often have you been bothered by the following problems?  Feeling nervous, anxious or on edge: Not at all  Not being able to stop or control worrying: Not at all  Worrying too much about different things: Not at all  Trouble Relaxing: Not at all  Being so restless that it is hard to sit still: Several days  Becoming easily annoyed or irritable: Not at all  Feeling afraid as if something awful might happen: Not at all  MARIA DEL CARMEN 7 Total Score: 1  If you checked any problems, how difficult have these problems made it for you to do your work, take care of things at home, or get along with other people: Not difficult at all    PHQ-2 Depression Screening  Little interest or pleasure in doing things? 0-->not at all   Feeling down, depressed, or hopeless?  0-->not at all   PHQ-2 Total Score 0     PHQ-9 Depression Screening  Little interest or pleasure in doing things? 0-->not at all   Feeling down, depressed, or hopeless? 0-->not at all   Trouble falling or staying asleep, or sleeping too much?     Feeling tired or having little energy?     Poor appetite or overeating?     Feeling bad about yourself - or that you are a failure or have let yourself or your family down?     Trouble concentrating on things, such as reading the newspaper or watching television?     Moving or speaking so slowly that other people could have noticed? Or the opposite - being so fidgety or restless that you have been moving around a lot more than usual?     Thoughts that you would be better off dead, or of hurting yourself in some way?     PHQ-9 Total Score 0   If you checked off any problems, how difficult have these problems made it for you to do your work, take care of things at home, or get along with other people?        Result Review  Data Reviewed:                Assessment and Plan      Diagnoses and all orders for this visit:    1. Ingrown left greater toenail (Primary)  -     Ambulatory Referral to Podiatry  -     cephalexin (Keflex) 500 MG capsule; Take 1 capsule by mouth 2 (Two) Times a Day.  Dispense: 20 capsule; Refill: 0    2. Nonintractable headache, unspecified chronicity pattern, unspecified headache type  -     meloxicam (MOBIC) 15 MG tablet; Take 1 tablet by mouth Daily.  Dispense: 30 tablet; Refill: 2      Patient here today with his mother with complaints of an ingrown toenail.  He would also like refills of Mobic today.  He was treated for the ingrown toenail to his left great toe in June.  Patient and mother both state that his symptoms improved some, but have now become worse again.  He complains of pain to the left great toe.  There is some drainage and swelling noted on exam as well.  Discussed options of removing his toenail and treating with antibiotics or to try  treating with antibiotics first.  So discussed the option of a referral to podiatry which would be recommended if he would like the toenail removed.  He would like to try treatment only at this and a referral to podiatry to have removed at a later date.  Start Keflex 500 mg twice daily x10 days.  Referral placed to podiatry.  Educated on soaking foot in Epsom salt as well.  Refill sent of Mobic.  Follow-up as needed.      Follow Up   Return if symptoms worsen or fail to improve.  Patient was given instructions and counseling regarding his condition or for health maintenance advice. Please see specific information pulled into the AVS if appropriate.

## 2023-08-08 NOTE — PROGRESS NOTES
"Chief Complaint  Ingrown toenail of left foot    Subjective    History of Present Illness {CC  Problem List  Visit Diagnosis   Encounters  Notes  Medications  Labs  Result Review Imaging  Media :23}     Patient presents to [facility] for   History of Present Illness  Patient is here today for a follow up on his ingrown toe nail on left foot. He was seen in June for this concern. Patient states this area has not improved. Would like that looked at today. He is also requesting refills on his Synthroid.      Review of Systems    I have reviewed and agree with the {[facility] ROS Review:78616} information as above.  Melanie Dent MA     Objective   Vital Signs:   /80   Pulse 77   Temp 98 øF (36.7 øC)   Ht 175.3 cm (69\")   Wt 76.2 kg (168 lb)   SpO2 98%   BMI 24.81 kg/mý     BMI is within normal parameters. No other follow-up for BMI required.      Physical Exam     MARIA DEL CARMEN-7: Over the last two weeks, how often have you been bothered by the following problems?  Feeling nervous, anxious or on edge: Not at all  Not being able to stop or control worrying: Not at all  Worrying too much about different things: Not at all  Trouble Relaxing: Not at all  Being so restless that it is hard to sit still: Several days  Becoming easily annoyed or irritable: Not at all  Feeling afraid as if something awful might happen: Not at all  MARIA DEL CARMEN 7 Total Score: 1  If you checked any problems, how difficult have these problems made it for you to do your work, take care of things at home, or get along with other people: Not difficult at all    PHQ-2 Depression Screening  Little interest or pleasure in doing things? 0-->not at all   Feeling down, depressed, or hopeless? 0-->not at all   PHQ-2 Total Score 0     PHQ-9 Depression Screening  Little interest or pleasure in doing things? 0-->not at all   Feeling down, depressed, or hopeless? 0-->not at all   Trouble falling or staying asleep, or sleeping too much?  " "   Feeling tired or having little energy?     Poor appetite or overeating?     Feeling bad about yourself - or that you are a failure or have let yourself or your family down?     Trouble concentrating on things, such as reading the newspaper or watching television?     Moving or speaking so slowly that other people could have noticed? Or the opposite - being so fidgety or restless that you have been moving around a lot more than usual?     Thoughts that you would be better off dead, or of hurting yourself in some way?     PHQ-9 Total Score 0   If you checked off any problems, how difficult have these problems made it for you to do your work, take care of things at home, or get along with other people?        Result Review  Data Reviewed:{ Labs  Result Review  Imaging  Med Tab  Media :23}   {The following data was reviewed by (Optional):90625}  {Ambulatory Labs (Optional):31722}  {Data reviewed (Optional):13954:::1}            Assessment and Plan {CC Problem List  Visit Diagnosis  ROS  Review (Popup)  Health Maintenance  Quality  BestPractice  Medications  SmartSets  SnapShot Encounters  Media :23}     There are no diagnoses linked to this encounter.    {Time Spent (Optional):31174}    Follow Up {Instructions Charge Capture  Follow-up Communications :23}  No follow-ups on file.  Patient was given instructions and counseling regarding his condition or for health maintenance advice. Please see specific information pulled into the AVS if appropriate.         Subjective    Frederick Simms is a 58 y.o. male who presents for a Subsequent Medicare Wellness Visit.    The following portions of the patient's history were reviewed and   updated as appropriate: {history reviewed:20406::\"allergies\",\"current medications\",\"past family history\",\"past medical history\",\"past social history\",\"past surgical history\",\"problem list\"}.    Compared to one year ago, the patient feels his physical   health is {better worse " same:65331}.    Compared to one year ago, the patient feels his mental   health is {better worse same:34730}.    Recent Hospitalizations:  {Hospital Admission Status in the last 365 days:11590}      Current Medical Providers:  Patient Care Team:  Edgar Gomez Jr., MD as PCP - General (Family Medicine)  Rea Sands APRN as Nurse Practitioner (Nurse Practitioner)    Outpatient Medications Prior to Visit   Medication Sig Dispense Refill    amitriptyline (ELAVIL) 50 MG tablet Take 1 tablet by mouth Every Night. 30 tablet 2    aspirin 81 MG EC tablet Take 1 tablet by mouth Daily.      fenofibrate 160 MG tablet Take 1 tablet by mouth Every Night.      levothyroxine (SYNTHROID, LEVOTHROID) 75 MCG tablet Take 1 tablet by mouth.      meloxicam (MOBIC) 15 MG tablet Take 1 tablet by mouth once daily 30 tablet 0    metoprolol succinate XL (TOPROL-XL) 50 MG 24 hr tablet Take 1.5 tablets by mouth Daily. 45 tablet 11    OLANZapine (zyPREXA) 10 MG tablet Take 1 tablet by mouth Every Night.      omeprazole (priLOSEC) 20 MG capsule TAKE 1 CAPSULE BY MOUTH ONCE DAILY FOR REFLUX      polyethylene glycol (MIRALAX) 17 GM/SCOOP powder MIX 17 GRAMS OF POWDER IN 6-8 OUNCES IN DRINK OF CHOICE AND TAKE ONCE DAILY FOR CONSTIPATION      cephalexin (Keflex) 500 MG capsule Take 1 capsule by mouth 2 (Two) Times a Day. (Patient not taking: Reported on 8/8/2023) 20 capsule 0    phenazopyridine (PYRIDIUM) 100 MG tablet Take 1 tablet by mouth 3 (Three) Times a Day As Needed for Bladder Spasms. (Patient not taking: Reported on 8/8/2023) 21 tablet 1     No facility-administered medications prior to visit.       No opioid medication identified on active medication list. I have reviewed chart for other potential  high risk medication/s and harmful drug interactions in the elderly.        Aspirin is on active medication list. {ASPIRIN ON MEDICATION LIST INDICATED/NOT INDICATED:79421}.      Patient Active Problem List  "  Diagnosis    Epigastric pain    Pain of upper abdomen    Nephrolithiasis    Pyelonephritis    Transaminitis    Constipation    Hypothyroid    Essential hypertension    Hyperlipidemia    Post-traumatic stricture of anterior urethra    Chest pain in adult    Family history of early CAD    Palpitations    HERMAN (dyspnea on exertion)     Advance Care Planning   Advance Care Planning     Advance Directive is not on file.  {ACP Discussion, Advance Directive not in EMR:49861}     Objective    Vitals:    23 0900   BP: 122/80   Pulse: 77   Temp: 98 øF (36.7 øC)   SpO2: 98%   Weight: 76.2 kg (168 lb)   Height: 175.3 cm (69\")     Estimated body mass index is 24.81 kg/mý as calculated from the following:    Height as of this encounter: 175.3 cm (69\").    Weight as of this encounter: 76.2 kg (168 lb).    BMI is within normal parameters. No other follow-up for BMI required.      Does the patient have evidence of cognitive impairment? {Yes/No:13575}          HEALTH RISK ASSESSMENT    Smoking Status:  Social History     Tobacco Use   Smoking Status Former    Packs/day: 0.50    Years: 5.00    Pack years: 2.50    Types: Cigarettes    Quit date:     Years since quittin.6   Smokeless Tobacco Never   Tobacco Comments    34 YRS AGO     Alcohol Consumption:  Social History     Substance and Sexual Activity   Alcohol Use No     Fall Risk Screen:    STEADI Fall Risk Assessment has not been completed.    Depression Screenin/8/2023     9:05 AM   PHQ-2/PHQ-9 Depression Screening   Little Interest or Pleasure in Doing Things 0-->not at all   Feeling Down, Depressed or Hopeless 0-->not at all   PHQ-9: Brief Depression Severity Measure Score 0       Health Habits and Functional and Cognitive Screening:       No data to display                Age-appropriate Screening Schedule:  Refer to the list below for future screening recommendations based on patient's age, sex and/or medical conditions. Orders for " these recommended tests are listed in the plan section. The patient has been provided with a written plan.    Health Maintenance   Topic Date Due    COVID-19 Vaccine (1) Never done    ZOSTER VACCINE (1 of 2) Never done    HEPATITIS C SCREENING  Never done    INFLUENZA VACCINE  10/01/2023    LIPID PANEL  01/17/2024    ANNUAL WELLNESS VISIT  05/15/2024    COLORECTAL CANCER SCREENING  06/09/2026    TDAP/TD VACCINES (2 - Td or Tdap) 04/24/2033    Pneumococcal Vaccine 0-64  Aged Out                  CMS Preventative Services Quick Reference  Risk Factors Identified During Encounter  {Medicare Wellness Risk Factors:00148}  The above risks/problems have been discussed with the patient.  Pertinent information has been shared with the patient in the After Visit Summary.  An After Visit Summary and PPPS were made available to the patient.    Follow Up:   Next Medicare Wellness visit to be scheduled in 1 year.   {Wrapup  Review (Popup)  Advance Care Planning  Labs  CC  Problem List  Visit Diagnosis  Medications  Result Review  Imaging  Health Maintenance  Quality  BestPractice  SmartSets  SnapShot  Encounters  Notes  Media  Procedures :23}    Additional E&M Note during same encounter follows:  Patient has multiple medical problems which are significant and separately identifiable that require additional work above and beyond the Medicare Wellness Visit.

## 2023-08-08 NOTE — PROGRESS NOTES
"Chief Complaint  Medicare Wellness-subsequent    Subjective    {Problem List  Visit Diagnosis   Encounters  Notes  Medications  Labs  Result Review Imaging  Media :23}    Frederick Simms presents to [facility]  History of Present Illness    Objective   Vital Signs:  /80   Pulse 77   Temp 98 øF (36.7 øC)   Ht 175.3 cm (69\")   Wt 76.2 kg (168 lb)   SpO2 98%   BMI 24.81 kg/mý   Estimated body mass index is 24.81 kg/mý as calculated from the following:    Height as of this encounter: 175.3 cm (69\").    Weight as of this encounter: 76.2 kg (168 lb).       BMI is within normal parameters. No other follow-up for BMI required.      Physical Exam   Result Review :{Labs  Result Review  Imaging  Med Tab  Media  Procedures  :23}  {The following data was reviewed by (Optional):20864}  {Ambulatory Labs (Optional):44718}  {Data reviewed (Optional):05398:::1}             Assessment and Plan {CC Problem List  Visit Diagnosis   ROS  Review (Popup)  Health Maintenance  Quality  BestPractice  Medications  SmartSets  SnapShot Encounters  Media :23}  There are no diagnoses linked to this encounter.       {Time Spent (Optional):03458}  Follow Up {Instructions Charge Capture  Follow-up Communications :23}  No follow-ups on file.  Patient was given instructions and counseling regarding his condition or for health maintenance advice. Please see specific information pulled into the AVS if appropriate.         "

## 2023-08-15 NOTE — PROGRESS NOTES
[facility] - PODIATRY    Today's Date: 08/17/2023     Patient Name: Frederick Simms  MRN: [id number]  CSN: [id number]  PCP: Edgar Gomez Jr., MD  Referring Provider: Shirley Hillman*    SUBJECTIVE     Chief Complaint   Patient presents with    Establish Care     Edgar Gomez Jr., MD 08/08/2023 NEW PT - Ingrown left greater toenail- pt states left great nail ingrown, about 2 months, has done couple rounds of antibiotics, has 2 pills left on current dose, doesn't hurt bad, just not getting any better- pt pain 3/10 at worst     HPI: Frederick Simms, a 58 y.o.male, comes to clinic as a(n) new patient complaining of ingrown toenail and complaining of toenail/callus issues. Patient has h/o thyroid disease, GERD, HLD, HTN, lipoma, nephrolithiasis .  Patient presents with complaints of IGTN of the left hallux. States he has had IGTN for several months. Has been on 2 rounds of oral abx therapy without improvement. Denies previous issues with IGTN of this toe. Admits pain at 3/10 level and described as sharp. Relates previous treatment(s) including oral abx . Denies any constitutional symptoms. No other pedal complaints at this time.    Past Medical History:   Diagnosis Date    Disease of thyroid gland     Elevated cholesterol     GERD (gastroesophageal reflux disease)     Hyperlipidemia     Hypertension     Irregular cardiac rhythm     Lipoma     Nephrolithiasis 6/18/2021     Past Surgical History:   Procedure Laterality Date    COLONOSCOPY N/A 6/9/2021    Procedure: COLONOSCOPY WITH ANESTHESIA;  Surgeon: Willian Bajwa DO;  Location: [facility];  Service: Gastroenterology;  Laterality: N/A;  preop; abdominal pain  postop; rectal ulcer   PCP Edgar Gomez     ENDOSCOPY N/A 12/16/2019    Procedure: ESOPHAGOGASTRODUODENOSCOPY WITH ANESTHESIA;  Surgeon: Willian Bajwa DO;  Location: [facility];  Service: Gastroenterology    EXTRACORPOREAL SHOCK WAVE LITHOTRIPSY (ESWL) Right  2021    Procedure: EXTRACORPOREAL SHOCKWAVE LITHOTRIPSY RIGHT;  Surgeon: Mega Muñoz MD;  Location:  [facility] OR;  Service: Urology;  Laterality: Right;    GALLBLADDER SURGERY      LIPOMA EXCISION      TONSILLECTOMY      URETHRAL DILATATION N/A 2023    Procedure: CYSTOSCOPY, BALLOON URETHRAL DILATATION;  Surgeon: Mega Muñoz MD;  Location:  [facility] OR;  Service: Urology;  Laterality: N/A;     Family History   Problem Relation Age of Onset    Hypertension Mother     Heart disease Mother     Stroke Father     No Known Problems Sister     No Known Problems Brother     No Known Problems Daughter     No Known Problems Son     Heart disease Maternal Grandmother     No Known Problems Paternal Grandmother     No Known Problems Maternal Aunt     No Known Problems Paternal Aunt     BRCA 1/2 Neg Hx     Breast cancer Neg Hx     Colon cancer Neg Hx     Endometrial cancer Neg Hx     Ovarian cancer Neg Hx     Colon polyps Neg Hx      Social History     Socioeconomic History    Marital status: Single   Tobacco Use    Smoking status: Former     Packs/day: 0.50     Years: 5.00     Pack years: 2.50     Types: Cigarettes     Quit date:      Years since quittin.6    Smokeless tobacco: Never    Tobacco comments:     34 YRS AGO   Vaping Use    Vaping Use: Never used   Substance and Sexual Activity    Alcohol use: No    Drug use: No    Sexual activity: Defer     No Known Allergies  Current Outpatient Medications   Medication Sig Dispense Refill    amitriptyline (ELAVIL) 50 MG tablet Take 1 tablet by mouth Every Night. 30 tablet 2    aspirin 81 MG EC tablet Take 1 tablet by mouth Daily.      cephalexin (Keflex) 500 MG capsule Take 1 capsule by mouth 2 (Two) Times a Day. 20 capsule 0    fenofibrate 160 MG tablet Take 1 tablet by mouth Every Night.      levothyroxine (SYNTHROID, LEVOTHROID) 75 MCG tablet Take 1 tablet by mouth.      meloxicam (MOBIC) 15 MG tablet Take 1 tablet by mouth Daily. 30 tablet 2     metoprolol succinate XL (TOPROL-XL) 50 MG 24 hr tablet Take 1.5 tablets by mouth Daily. 45 tablet 11    OLANZapine (zyPREXA) 10 MG tablet Take 1 tablet by mouth Every Night.      omeprazole (priLOSEC) 20 MG capsule TAKE 1 CAPSULE BY MOUTH ONCE DAILY FOR REFLUX      polyethylene glycol (MIRALAX) 17 GM/SCOOP powder MIX 17 GRAMS OF POWDER IN 6-8 OUNCES IN DRINK OF CHOICE AND TAKE ONCE DAILY FOR CONSTIPATION       Current Facility-Administered Medications   Medication Dose Route Frequency Provider Last Rate Last Admin    lidocaine (XYLOCAINE) 2% injection 5 mL  5 mL Injection Once Richard Beltran, BRITNEY         Review of Systems   Constitutional:  Negative for chills and fever.   HENT:  Negative for congestion.    Respiratory:  Negative for shortness of breath.    Cardiovascular:  Negative for chest pain and leg swelling.   Gastrointestinal:  Negative for constipation, diarrhea, nausea and vomiting.   Musculoskeletal:  Positive for arthralgias. Negative for myalgias.   Skin:  Positive for color change. Negative for wound.        IGTN   Neurological:  Negative for numbness.     OBJECTIVE     Vitals:    08/17/23 1405   BP: 126/62   Pulse: 86   SpO2: 98%       PHYSICAL EXAM  GEN:   Accompanied by mother.     Foot/Ankle Exam    GENERAL  Appearance:  appears stated age  Orientation:  AAOx3  Affect:  appropriate  Gait:  unimpaired  Assistance:  independent  Right shoe gear: casual shoe  Left shoe gear: casual shoe    VASCULAR     Right Foot Vascularity   Dorsalis pedis:  2+  Posterior tibial:  2+  Skin temperature:  warm  Edema grading:  None  CFT:  3  Pedal hair growth:  Present  Varicosities:  none     Left Foot Vascularity   Dorsalis pedis:  2+  Posterior tibial:  2+  Skin temperature:  warm  Edema grading:  None  CFT:  3  Pedal hair growth:  Present  Varicosities:  none     NEUROLOGIC     Right Foot Neurologic   Normal sensation    Light touch sensation: normal  Vibratory sensation: normal  Hot/Cold sensation:  normal     Left Foot Neurologic   Normal sensation    Light touch sensation: normal  Vibratory sensation: normal  Hot/Cold sensation:  normal    MUSCULOSKELETAL     Right Foot Musculoskeletal   Tenderness:  none    Arch:  Normal     Left Foot Musculoskeletal   Tenderness:  toe 1 tenderness  Arch:  Normal  Hallux valgus: Yes      MUSCLE STRENGTH     Right Foot Muscle Strength   Foot dorsiflexion:  5  Foot plantar flexion:  5  Foot inversion:  5  Foot eversion:  5     Left Foot Muscle Strength   Foot dorsiflexion:  5  Foot plantar flexion:  5  Foot inversion:  5  Foot eversion:  5    RANGE OF MOTION     Right Foot Range of Motion   Foot and ankle ROM within normal limits       Left Foot Range of Motion   Foot and ankle ROM within normal limits      DERMATOLOGIC      Right Foot Dermatologic   Skin  Right foot skin is intact.      Left Foot Dermatologic   Skin  Positive for drainage and skin changes.   Nails  1.  Positive for ingrown toenail.    RADIOLOGY/NUCLEAR:  No results found.    LABORATORY/CULTURE RESULTS:      PATHOLOGY RESULTS:       ASSESSMENT/PLAN     Diagnoses and all orders for this visit:    1. Ingrowing left great toenail (Primary)  -     lidocaine (XYLOCAINE) 2% injection 5 mL  -     Nail Removal    2. Pain around toenail  -     lidocaine (XYLOCAINE) 2% injection 5 mL      Comprehensive lower extremity examination and evaluation was performed.  Discussed findings and treatment plan including risks, benefits, and treatment options with patient in detail. Patient agreed with treatment plan.  Given nail findings and chronicity of issue, recommend PNA to alleviate nail issue at this time.   After written consent obtained, total/partial nail avulsion(s) performed as documented in procedure note. Post-procedure instructions given.    An After Visit Summary was printed and given to the patient at discharge, including (if requested) any available informative/educational handouts regarding diagnosis, treatment,  or medications. All questions were answered to patient/family satisfaction. Should symptoms fail to improve or worsen they agree to call or return to clinic or to go to the Emergency Department. Discussed the importance of following up with any needed screening tests/labs/specialist appointments and any requested follow-up recommended by me today. Importance of maintaining follow-up discussed and patient accepts that missed appointments can delay diagnosis and potentially lead to worsening of conditions.  Return if symptoms worsen or fail to improve., or sooner if acute issues arise.    Nail Removal    Date/Time: 8/17/2023 2:26 PM  Performed by: Richard Beltran APRN  Authorized by: Richard Beltran APRN   Location: left foot  Location details: left big toe  Anesthesia: digital block    Anesthesia:  Local Anesthetic: lidocaine 2% without epinephrine  Anesthetic total: 5 mL    Sedation:  Patient sedated: no    Preparation: skin prepped with Betadine  Amount removed: partial  Side: medial  Wedge excision of skin of nail fold: no  Nail bed sutured: no  Nail matrix removed: none  Removed nail replaced and anchored: no  Dressing: antibiotic ointment, gauze roll and 4x4  Patient tolerance: patient tolerated the procedure well with no immediate complications  Comments: Flushed with alcohol. Silvadene applied.           Lab Frequency Next Occurrence   Follow Anesthesia Guidelines / Standing Orders Once 12/03/2019   Obtain Informed Consent Once 12/03/2019   Follow Anesthesia Guidelines / Standing Orders Once 06/18/2021   Provide NPO Instructions to Patient Once 06/23/2021   Follow Anesthesia Guidelines / Protocol Once 04/22/2023   Obtain Informed Consent Once 04/22/2023   Provide NPO Instructions to Patient Once 04/22/2023   Chlorhexidine Skin Prep Once 04/22/2023       This document has been electronically signed by BRITNEY Nowak on August 17, 2023 14:27 CDT

## 2023-08-16 ENCOUNTER — TELEPHONE (OUTPATIENT)
Dept: PODIATRY | Facility: CLINIC | Age: 58
End: 2023-08-16
Payer: MEDICARE

## 2023-08-16 NOTE — TELEPHONE ENCOUNTER
Called patient regarding appt on 08/17/2023. Left message for patient to return call if any questions or concerns arise.

## 2023-08-17 ENCOUNTER — OFFICE VISIT (OUTPATIENT)
Dept: PODIATRY | Facility: CLINIC | Age: 58
End: 2023-08-17
Payer: MEDICARE

## 2023-08-17 VITALS
OXYGEN SATURATION: 98 % | BODY MASS INDEX: 25.03 KG/M2 | DIASTOLIC BLOOD PRESSURE: 62 MMHG | WEIGHT: 169 LBS | SYSTOLIC BLOOD PRESSURE: 126 MMHG | HEIGHT: 69 IN | HEART RATE: 86 BPM

## 2023-08-17 DIAGNOSIS — L60.0 INGROWING LEFT GREAT TOENAIL: Primary | ICD-10-CM

## 2023-08-17 DIAGNOSIS — M79.676 PAIN AROUND TOENAIL: ICD-10-CM

## 2023-08-17 RX ORDER — LIDOCAINE HYDROCHLORIDE 20 MG/ML
5 INJECTION, SOLUTION INFILTRATION; PERINEURAL ONCE
OUTPATIENT
Start: 2023-08-17

## 2023-08-17 NOTE — PATIENT INSTRUCTIONS
Nail Avulsion  Nail avulsion is when a nail tears away from the nail bed due to an accident or injury. Nail avulsion can be painful. Your finger or toe may bleed a lot, and you may have some pain, redness, throbbing, and swelling while it heals. Your nail will grow back within several months. Once it grows back, it might not look the same as the old nail. This may happen even after taking good care of it.  Follow these instructions at home:  Wound care  Follow instructions from your health care provider about how to take care of your wound. Make sure you:  Wash your hands with soap and water before and after you change your bandage (dressing). If soap and water are not available, use hand .  Change your dressing as told by your health care provider.  Leave stitches (sutures), skin glue, or adhesive strips in place, if present. These skin closures may need to stay in place for 2 weeks or longer. If adhesive strip edges start to loosen and curl up, you may trim the loose edges. Do not remove adhesive strips completely unless your health care provider tells you to do that.  Check your wound every day for signs of infection. Check for:  Redness, swelling, or pain.  Fluid or blood.  Warmth.  Pus or a bad smell.  Do not take baths, swim, or use a hot tub until your health care provider approves. Ask your health care provider if you may take showers. You may only be allowed to take sponge baths.  If you notice bleeding, press gently on the nail bed with a gauze pad. Do this for 15 minutes.  Keep the wound dry for 48 hours. After 48 hours have passed, lightly wash the finger or toe in warm, soapy water 2-3 times a day. This helps to reduce pain and swelling. It also prevents infection.  Medicine  Take over-the-counter and prescription medicines only as told by your health care provider. Do not take aspirin or products containing aspirin unless directed by your health care provider. These products can increase  bleeding.  If you were prescribed an antibiotic medicine, take it or apply it as told by your health care provider. Do not stop taking or using the antibiotic even if you start to feel better.  General instructions    Keep the injured hand or foot raised above the level of your heart as much as possible in the first 48 hours after the injury. This helps to reduce pain and swelling.  Move the toe or finger often to avoid stiffness.  Do not use any products that contain nicotine or tobacco, such as cigarettes, e-cigarettes, and chewing tobacco. These can delay healing. If you need help quitting, ask your health care provider.  Contact a health care provider if:  You have increased redness, swelling, or pain around your wound.  You have fluid or blood coming from your wound.  You have pus or a bad smell coming from your wound.  Your wound or the area around your wound feels warm to the touch.  Get help right away if:  You have bleeding that does not stop, even when you apply pressure to the wound.  You have a temperature that is higher than 100.4øF (38øC).  The affected finger or toe looks white or black.  Summary  Nail avulsion is when a nail tears away from the nail bed due to an accident or injury.  Follow instructions from your health care provider about how to take care of your wound.  Keep the injured hand or foot raised above the level of your heart as much as possible in the first 48 hours after the injury. This helps to reduce pain and swelling.  Check your wound every day for signs of infection.  Contact a health care provider if you have increased redness, swelling, or pain around your wound.  This information is not intended to replace advice given to you by your health care provider. Make sure you discuss any questions you have with your health care provider.  Document Revised: 10/13/2022 Document Reviewed: 10/13/2022  Elsevier Patient Education c 2023 [employer]

## 2023-10-05 ENCOUNTER — TELEPHONE (OUTPATIENT)
Dept: GASTROENTEROLOGY | Age: 58
End: 2023-10-05

## 2023-10-05 DIAGNOSIS — K76.89 LIVER CYST: ICD-10-CM

## 2023-10-05 DIAGNOSIS — R93.2 ABNORMAL LIVER ULTRASOUND: Primary | ICD-10-CM

## 2023-10-05 NOTE — TELEPHONE ENCOUNTER
----- Message from Vane Reagan, [place] sent at 4/10/2023  1:27 PM CDT -----  Regarding: Liver us  Liver us repeat in 6-12 months (Due 6 month 10-        10-5-23 Called Johnson Aroldovicki (Mother) notified that the patient was due for a repeat Liver us       Transferred to central [facility]

## 2023-10-10 ENCOUNTER — HOSPITAL ENCOUNTER (OUTPATIENT)
Dept: ULTRASOUND IMAGING | Age: 58
Discharge: HOME | End: 2023-10-10

## 2023-10-10 DIAGNOSIS — R93.2 ABNORMAL LIVER ULTRASOUND: ICD-10-CM

## 2023-10-10 DIAGNOSIS — K76.89 LIVER CYST: ICD-10-CM

## 2023-10-16 ENCOUNTER — HOSPITAL ENCOUNTER (OUTPATIENT)
Dept: ULTRASOUND IMAGING | Age: 58
Discharge: HOME | End: 2023-10-16
Payer: MEDICARE

## 2023-10-16 PROCEDURE — 76705 ECHO EXAM OF ABDOMEN: CPT

## 2023-11-16 ENCOUNTER — OFFICE VISIT (OUTPATIENT)
Age: 58
End: 2023-11-16
Payer: MEDICARE

## 2023-11-16 VITALS
OXYGEN SATURATION: 96 % | TEMPERATURE: 98 F | BODY MASS INDEX: 25.03 KG/M2 | DIASTOLIC BLOOD PRESSURE: 68 MMHG | WEIGHT: 169 LBS | HEIGHT: 69 IN | SYSTOLIC BLOOD PRESSURE: 120 MMHG | HEART RATE: 75 BPM | RESPIRATION RATE: 22 BRPM

## 2023-11-16 DIAGNOSIS — H66.002 LEFT ACUTE SUPPURATIVE OTITIS MEDIA: Primary | ICD-10-CM

## 2023-11-16 PROCEDURE — G8427 DOCREV CUR MEDS BY ELIG CLIN: HCPCS | Performed by: NURSE PRACTITIONER

## 2023-11-16 PROCEDURE — 4004F PT TOBACCO SCREEN RCVD TLK: CPT | Performed by: NURSE PRACTITIONER

## 2023-11-16 PROCEDURE — 3017F COLORECTAL CA SCREEN DOC REV: CPT | Performed by: NURSE PRACTITIONER

## 2023-11-16 PROCEDURE — G8484 FLU IMMUNIZE NO ADMIN: HCPCS | Performed by: NURSE PRACTITIONER

## 2023-11-16 PROCEDURE — G8420 CALC BMI NORM PARAMETERS: HCPCS | Performed by: NURSE PRACTITIONER

## 2023-11-16 PROCEDURE — 99203 OFFICE O/P NEW LOW 30 MIN: CPT | Performed by: NURSE PRACTITIONER

## 2023-11-16 RX ORDER — METOPROLOL SUCCINATE 50 MG/1
TABLET, EXTENDED RELEASE ORAL
COMMUNITY
Start: 2023-10-28

## 2023-11-16 RX ORDER — LEVOTHYROXINE SODIUM 0.1 MG/1
TABLET ORAL
COMMUNITY
Start: 2023-11-04

## 2023-11-16 RX ORDER — TAMSULOSIN HYDROCHLORIDE 0.4 MG/1
0.4 CAPSULE ORAL 2 TIMES DAILY
COMMUNITY
Start: 2023-11-03

## 2023-11-16 RX ORDER — OLANZAPINE 10 MG/1
10 TABLET ORAL DAILY
COMMUNITY
Start: 2023-10-16

## 2023-11-16 RX ORDER — AMITRIPTYLINE HYDROCHLORIDE 50 MG/1
TABLET, FILM COATED ORAL
COMMUNITY
Start: 2023-10-10

## 2023-11-16 RX ORDER — AMOXICILLIN AND CLAVULANATE POTASSIUM 875; 125 MG/1; MG/1
1 TABLET, FILM COATED ORAL 2 TIMES DAILY
Qty: 20 TABLET | Refills: 0 | Status: SHIPPED | OUTPATIENT
Start: 2023-11-16 | End: 2023-11-26

## 2023-11-16 ASSESSMENT — ENCOUNTER SYMPTOMS
VOMITING: 0
ABDOMINAL PAIN: 0
COUGH: 0
NAUSEA: 0
DIARRHEA: 0
SHORTNESS OF BREATH: 0
RHINORRHEA: 0
SINUS PRESSURE: 0
SORE THROAT: 0
ALLERGIC/IMMUNOLOGIC NEGATIVE: 1

## 2023-11-16 ASSESSMENT — VISUAL ACUITY: OU: 1

## 2023-11-16 NOTE — PROGRESS NOTES
[address] [place] [address]  Dept: [phone]  Dept Fax: [phone]  Loc: [phone]    Maribel Langley is a 66 y.o. male who presents today for his medical conditions/complaintsas noted below. Maribel Langley is c/o of Otalgia (Left pain and swelling/3-4 days)        HPI:     Otalgia   There is pain in the left ear. This is a new problem. The current episode started in the past 7 days (4 days). The problem occurs constantly. The problem has been unchanged. There has been no fever. The pain is at a severity of 5/10. The pain is moderate. Associated symptoms include hearing loss. Pertinent negatives include no abdominal pain, coughing, diarrhea, ear discharge, headaches, rash, rhinorrhea, sore throat or vomiting. He has tried nothing for the symptoms. The treatment provided no relief. There is no history of a chronic ear infection or hearing loss. Amro Lockwood is here with pain in his left ear for 4 days. His mother says he had a cold last week and this week has c/o ear pain and not being able to hear. He has been taking nothing OTC for symptoms. History reviewed. No pertinent past medical history. No past surgical history on file. No family history on file.     Social History     Tobacco Use    Smoking status: Not on file    Smokeless tobacco: Not on file   Substance Use Topics    Alcohol use: Not on file      Current Outpatient Medications   Medication Sig Dispense Refill    metoprolol succinate (TOPROL XL) 50 MG extended release tablet TAKE 1 & 1/2 (ONE & ONE-HALF) TABLETS BY MOUTH ONCE DAILY      levothyroxine (SYNTHROID) 100 MCG tablet TAKE 1 TABLET BY MOUTH ONCE DAILY FOR THYROID      amitriptyline (ELAVIL) 50 MG tablet       OLANZapine (ZYPREXA) 10 MG tablet Take 1 tablet by mouth daily      tamsulosin (FLOMAX) 0.4 MG capsule Take 1 capsule by mouth 2 times daily      amoxicillin-clavulanate (AUGMENTIN) 875-125 MG per

## 2023-11-16 NOTE — PATIENT INSTRUCTIONS
Plenty of fluids  Rest  OTC Tylenol or Motrin as needed   Augmentin as directed  OTC Claritin or Zyrtec daily  OTC Flonase 2 sprays each nostril at bedtime  Follow up with PCP or return to Urgent Care for worsening or unresolved symptoms.

## 2023-12-01 NOTE — PROGRESS NOTES
Subjective    Mr. Simms is 58 y.o. male    Chief Complaint: Anterior Urethral Stricture     History of Present Illness    Patient is status post dilation of anterior urethral stricture 5/4/2023.  He has done well postoperatively.  AUA symptom score 34/35 with nocturia x5.  Denies any other voiding symptoms.  He is maintained on tamsulosin.  Cystoscopy did showed mild lateral lobe hypertrophy of the prostate.      The following portions of the patient's history were reviewed and updated as appropriate: allergies, current medications, past family history, past medical history, past social history, past surgical history and problem list.    Review of Systems      Current Outpatient Medications:     amitriptyline (ELAVIL) 50 MG tablet, Take 1 tablet by mouth Every Night., Disp: 30 tablet, Rfl: 2    aspirin 81 MG EC tablet, Take 1 tablet by mouth Daily., Disp: , Rfl:     cephalexin (Keflex) 500 MG capsule, Take 1 capsule by mouth 2 (Two) Times a Day., Disp: 20 capsule, Rfl: 0    fenofibrate 160 MG tablet, Take 1 tablet by mouth Every Night., Disp: , Rfl:     levothyroxine (SYNTHROID, LEVOTHROID) 100 MCG tablet, Take 1 tablet by mouth Daily., Disp: , Rfl:     meloxicam (MOBIC) 15 MG tablet, Take 1 tablet by mouth Daily., Disp: 30 tablet, Rfl: 2    metoprolol succinate XL (TOPROL-XL) 50 MG 24 hr tablet, Take 1.5 tablets by mouth Daily., Disp: 45 tablet, Rfl: 11    OLANZapine (zyPREXA) 10 MG tablet, Take 1 tablet by mouth Every Night., Disp: , Rfl:     omeprazole (priLOSEC) 20 MG capsule, TAKE 1 CAPSULE BY MOUTH ONCE DAILY FOR REFLUX, Disp: , Rfl:     polyethylene glycol (MIRALAX) 17 GM/SCOOP powder, MIX 17 GRAMS OF POWDER IN 6-8 OUNCES IN DRINK OF CHOICE AND TAKE ONCE DAILY FOR CONSTIPATION, Disp: , Rfl:     tamsulosin (FLOMAX) 0.4 MG capsule 24 hr capsule, Take 1 capsule by mouth., Disp: , Rfl:     Current Facility-Administered Medications:     lidocaine (XYLOCAINE) 2% injection 5 mL, 5 mL, Injection, Once, Richard Beltran  BRITNEY Wild    Past Medical History:   Diagnosis Date    Disease of thyroid gland     Elevated cholesterol     GERD (gastroesophageal reflux disease)     Hyperlipidemia     Hypertension     Irregular cardiac rhythm     Lipoma     Nephrolithiasis 2021       Past Surgical History:   Procedure Laterality Date    COLONOSCOPY N/A 2021    Procedure: COLONOSCOPY WITH ANESTHESIA;  Surgeon: Willian Bajwa DO;  Location: [facility] ENDOSCOPY;  Service: Gastroenterology;  Laterality: N/A;  preop; abdominal pain  postop; rectal ulcer   PCP Edgar Gomez     ENDOSCOPY N/A 2019    Procedure: ESOPHAGOGASTRODUODENOSCOPY WITH ANESTHESIA;  Surgeon: Willian Bajwa DO;  Location: [facility] ENDOSCOPY;  Service: Gastroenterology    EXTRACORPOREAL SHOCK WAVE LITHOTRIPSY (ESWL) Right 2021    Procedure: EXTRACORPOREAL SHOCKWAVE LITHOTRIPSY RIGHT;  Surgeon: Mega Muñoz MD;  Location: [facility] OR;  Service: Urology;  Laterality: Right;    GALLBLADDER SURGERY      LIPOMA EXCISION      TONSILLECTOMY      URETHRAL DILATATION N/A 2023    Procedure: CYSTOSCOPY, BALLOON URETHRAL DILATATION;  Surgeon: Mega Muñoz MD;  Location: [facility] OR;  Service: Urology;  Laterality: N/A;       Social History     Socioeconomic History    Marital status: Single   Tobacco Use    Smoking status: Former     Packs/day: 0.50     Years: 5.00     Additional pack years: 0.00     Total pack years: 2.50     Types: Cigarettes     Quit date:      Years since quittin.9    Smokeless tobacco: Never    Tobacco comments:     34 YRS AGO   Vaping Use    Vaping Use: Never used   Substance and Sexual Activity    Alcohol use: No    Drug use: No    Sexual activity: Defer       Family History   Problem Relation Age of Onset    Hypertension Mother     Heart disease Mother     Stroke Father     No Known Problems Sister     No Known Problems Brother     No Known Problems Daughter     No Known Problems Son     Heart disease Maternal  "Grandmother     No Known Problems Paternal Grandmother     No Known Problems Maternal Aunt     No Known Problems Paternal Aunt     BRCA 1/2 Neg Hx     Breast cancer Neg Hx     Colon cancer Neg Hx     Endometrial cancer Neg Hx     Ovarian cancer Neg Hx     Colon polyps Neg Hx        Objective    Temp 97.2 °F (36.2 °C)   Ht 175.3 cm (69\")   Wt 76.9 kg (169 lb 9.6 oz)   BMI 25.05 kg/m²     Physical Exam        Results for orders placed or performed in visit on 06/14/23   POC Urinalysis Dipstick, Multipro    Specimen: Urine   Result Value Ref Range    Color Yellow Yellow, Straw, Dark Yellow, Anai    Clarity, UA Clear Clear    Glucose, UA Negative Negative mg/dL    Bilirubin Negative Negative    Ketones, UA Negative Negative    Specific Gravity  1.005 1.005 - 1.030    Blood, UA Negative Negative    pH, Urine 5.5 5.0 - 8.0    Protein, POC Negative Negative mg/dL    Urobilinogen, UA 0.2 E.U./dL Normal, 0.2 E.U./dL    Nitrite, UA Negative Negative    Leukocytes Small (1+) (A) Negative     IPSS Questionnaire (AUA-7):  Incomplete emptying  Over the past month, how often have you had a sensation of not emptying your bladder completely after you finished urinating?: Almost always (12/15/23 0913)  Frequency  Over the past month, how often have you had to urinate again less than two hours after you finishied urinating ?: Almost always (12/15/23 0913)  Intermittency  Over the past month, how often have you found you stopped and started again several time when you urinated ?: More than half the time (12/15/23 0913)  Urgency  Over the last month, how often have you found it difficult  have you found it difficult to postpone urination ?: Almost always (12/15/23 0913)  Weak Stream  Over the past month, how often have you had a weak urinary stream ?: Almost always (12/15/23 0913)  Straining  Over the past month, how often have you had to push or strain to begin urination ?: Almost always (12/15/23 0913)  Nocturia  Over the past " month, how many times did you most typically get up to urinate from the time you went to bed until the time you got up in the morning ?: At least 5 times (12/15/23 0913)  Quality of life due to urinary symptoms  If you were to spend the rest of your life with your urinary condition the way it is now, how would feel about that?: Delighted (12/15/23 0913)    Scores  Total IPSS Score: (!) 34 (12/15/23 0913)  Total Score = Symptomatic Level: Severely symptomatic: 20-35 (12/15/23 0913)        Estimation of residual urine via abdominal ultrasound  Residual Urine: 41 ml  Indication: Frequency  Position: Supine  Examination: Incremental scanning of the suprapubic area using 3 MHz transducer using copious amounts of acoustic gel.   Findings: An anechoic area was demonstrated which represented the bladder, with measurement of residual urine as noted. I inspected this myself. In that the residual urine was stable or insignificant, no treatment will be necessary at this time.       Assessment and Plan    Diagnoses and all orders for this visit:    1. Anterior urethral stricture (Primary)  -     POC Urinalysis Dipstick, Multipro    2. BPH with urinary obstruction        Patient with recurrent voiding symptoms with history of anterior stricture.  Plan for referral to [facility].

## 2023-12-15 ENCOUNTER — OFFICE VISIT (OUTPATIENT)
Dept: UROLOGY | Facility: CLINIC | Age: 58
End: 2023-12-15
Payer: MEDICARE

## 2023-12-15 VITALS — HEIGHT: 69 IN | BODY MASS INDEX: 25.12 KG/M2 | TEMPERATURE: 97.2 F | WEIGHT: 169.6 LBS

## 2023-12-15 DIAGNOSIS — N40.1 BPH WITH URINARY OBSTRUCTION: ICD-10-CM

## 2023-12-15 DIAGNOSIS — N13.8 BPH WITH URINARY OBSTRUCTION: ICD-10-CM

## 2023-12-15 DIAGNOSIS — N35.914 ANTERIOR URETHRAL STRICTURE: Primary | ICD-10-CM

## 2023-12-15 LAB
BILIRUB BLD-MCNC: NEGATIVE MG/DL
CLARITY, POC: ABNORMAL
COLOR UR: YELLOW
GLUCOSE UR STRIP-MCNC: NEGATIVE MG/DL
KETONES UR QL: NEGATIVE
LEUKOCYTE EST, POC: ABNORMAL
NITRITE UR-MCNC: NEGATIVE MG/ML
PH UR: 6 [PH] (ref 5–8)
PROT UR STRIP-MCNC: NEGATIVE MG/DL
RBC # UR STRIP: NEGATIVE /UL
SP GR UR: 1.01 (ref 1–1.03)
UROBILINOGEN UR QL: ABNORMAL

## 2023-12-15 RX ORDER — TAMSULOSIN HYDROCHLORIDE 0.4 MG/1
0.4 CAPSULE ORAL
COMMUNITY
Start: 2023-11-03

## 2023-12-15 RX ORDER — LEVOTHYROXINE SODIUM 0.1 MG/1
1 TABLET ORAL DAILY
COMMUNITY
Start: 2023-11-04

## 2023-12-20 NOTE — PROGRESS NOTES
Subjective:     Encounter Date: 12/20/2023      Patient ID: Frederick Simms is a 58 y.o. male with hypertension, hypothyroidism, family history of CAD.    Chief Complaint: follow up  Hypertension  This is a chronic problem. The current episode started more than 1 year ago. The problem is controlled. Associated symptoms include chest pain and palpitations. Pertinent negatives include no malaise/fatigue, orthopnea, peripheral edema, PND or shortness of breath. Risk factors for coronary artery disease include dyslipidemia and male gender. Current antihypertension treatment includes beta blockers.     Patient presents today for management of chest pain. Patient was seen by Dr Estrella on 4/24/2023 for evaluation of chest pain and dizziness. He wore a holter monitor taht showed 9 runs of SVT longest episode was 16 beats and fastest episode of at a rate of 193 bpm. Echo was done that showed LVEF 61-65% and no significant valvular disease. He underwent a CTA that showed Calicium score of 17. Normal LAD with mild focal stenosis of 30-40%, normal RCA, and no significant disease of left circumflex, right posterior lateral artery, diagonal branch and right posterior descending coronary artery. LOV his metoprolol was increased due to palpitations. Today he reports that he has been continued to have some pain in his chest. Mother reports that he is looking at having a bladder surgery. Mother reports that her  has dementia and it is a stressful time in her house. He reports that he has some dyspnea on exertion present but it takes moderate exertion to produce. He denies any heart racing. He denies any leg swelling.  He denies any orthopnea or PND. Mother reports that he doesn't sleep well; he has medication to help him go to sleep; he is usually up somewhere between 4-6 am. He reports dizziness at times. He reports that it does occur with position change. He reports that his BP has been running 120-140/70-80. Patient follows  with Dr Gomez as PCP.     The following portions of the patient's history were reviewed and updated as appropriate: allergies, current medications, past family history, past medical history, past social history, past surgical history and problem list.    No Known Allergies    Current Outpatient Medications:     amitriptyline (ELAVIL) 50 MG tablet, Take 1 tablet by mouth Every Night., Disp: 30 tablet, Rfl: 2    aspirin 81 MG EC tablet, Take 1 tablet by mouth Daily., Disp: , Rfl:     fenofibrate 160 MG tablet, Take 1 tablet by mouth Every Night., Disp: , Rfl:     levothyroxine (SYNTHROID, LEVOTHROID) 100 MCG tablet, Take 1 tablet by mouth Daily., Disp: , Rfl:     meloxicam (MOBIC) 15 MG tablet, Take 1 tablet by mouth Daily., Disp: 30 tablet, Rfl: 2    metoprolol succinate XL (TOPROL-XL) 50 MG 24 hr tablet, Take 1.5 tablets by mouth Daily., Disp: 45 tablet, Rfl: 11    OLANZapine (zyPREXA) 10 MG tablet, Take 1 tablet by mouth Every Night., Disp: , Rfl:     omeprazole (priLOSEC) 20 MG capsule, TAKE 1 CAPSULE BY MOUTH ONCE DAILY FOR REFLUX, Disp: , Rfl:     polyethylene glycol (MIRALAX) 17 GM/SCOOP powder, MIX 17 GRAMS OF POWDER IN 6-8 OUNCES IN DRINK OF CHOICE AND TAKE ONCE DAILY FOR CONSTIPATION, Disp: , Rfl:     tamsulosin (FLOMAX) 0.4 MG capsule 24 hr capsule, Take 1 capsule by mouth., Disp: , Rfl:     Current Facility-Administered Medications:     lidocaine (XYLOCAINE) 2% injection 5 mL, 5 mL, Injection, Once, Richard Beltran APRN  Past Medical History:   Diagnosis Date    Disease of thyroid gland     Elevated cholesterol     GERD (gastroesophageal reflux disease)     Hyperlipidemia     Hypertension     Irregular cardiac rhythm     Lipoma     Nephrolithiasis 6/18/2021     Social History     Socioeconomic History    Marital status: Single   Tobacco Use    Smoking status: Former     Packs/day: 0.50     Years: 5.00     Additional pack years: 0.00     Total pack years: 2.50     Types: Cigarettes     Quit date: 1986      Years since quittin.9    Smokeless tobacco: Never    Tobacco comments:     34 YRS AGO   Vaping Use    Vaping Use: Never used   Substance and Sexual Activity    Alcohol use: No    Drug use: No    Sexual activity: Defer       Review of Systems   Constitutional: Negative for malaise/fatigue.   HENT:  Negative for nosebleeds.    Cardiovascular:  Positive for chest pain and palpitations. Negative for dyspnea on exertion, irregular heartbeat, leg swelling, near-syncope, orthopnea, paroxysmal nocturnal dyspnea and syncope.   Respiratory:  Negative for shortness of breath.    Hematologic/Lymphatic: Does not bruise/bleed easily.   Genitourinary:  Negative for hematuria.   Neurological:  Positive for dizziness. Negative for weakness.   Psychiatric/Behavioral:  The patient is nervous/anxious.    All other systems reviewed and are negative.         Objective:     Vitals reviewed.   Constitutional:       General: Not in acute distress.     Appearance: Normal appearance. Well-developed.   Eyes:      Pupils: Pupils are equal, round, and reactive to light.   HENT:      Head: Normocephalic and atraumatic.      Nose: Nose normal.   Neck:      Vascular: No carotid bruit.   Pulmonary:      Effort: Pulmonary effort is normal. No respiratory distress.      Breath sounds: Normal breath sounds. No wheezing. No rales.   Cardiovascular:      Normal rate. Regular rhythm.      Murmurs: There is no murmur.   Edema:     Peripheral edema absent.   Abdominal:      General: There is no distension.      Palpations: Abdomen is soft.   Musculoskeletal: Normal range of motion.      Cervical back: Normal range of motion and neck supple. Skin:     General: Skin is warm.      Findings: No erythema or rash.   Neurological:      General: No focal deficit present.      Mental Status: Alert and oriented to person, place, and time.   Psychiatric:         Attention and Perception: Attention normal.         Mood and Affect: Mood is anxious.          "Speech: Speech normal.         Behavior: Behavior normal.         Thought Content: Thought content normal.         Judgment: Judgment normal.         /91   Pulse 60   Ht 175.3 cm (69\")   Wt 74.8 kg (165 lb)   BMI 24.37 kg/m²       ECG 12 Lead    Date/Time: 12/21/2023 11:04 AM  Performed by: Kalen Alvarez APRN    Authorized by: Kalen Alvarez APRN  Comparison: compared with previous ECG from 5/24/2023  Similar to previous ECG  Rhythm: sinus rhythm  Rate: normal  BPM: 60          Lab Review:       Holter monitor 4/24/2023:  Conclusion:   Baseline rhythm was sinus.  No significant ectopy   9 runs of supraventricular tachycardia longest 16 beats at an average rate of 145 bpm  Most rapid SVT episode was 6 beats at a maximum rate of 193 bpm and average rate of 169 bpm at 4:01 PM    Results for orders placed during the hospital encounter of 05/24/23    Adult Transthoracic Echo Complete w/ Color, Spectral and Contrast if necessary per protocol    Interpretation Summary    Left ventricular systolic function is normal. Left ventricular ejection fraction appears to be 61 - 65%.    Left ventricular diastolic function was normal.    Estimated right ventricular systolic pressure from tricuspid regurgitation is normal (<35 mmHg).    Normal global longitudinal LV strain (GLS) = -25.1%.    Lab Results   Component Value Date    CHOL 170 11/27/2017    CHLPL 146 (L) 06/14/2018    TRIG 96 06/14/2018    HDL 47 (L) 06/14/2018    LDL 80 06/14/2018       Cardiac CT angiography  5/24/2023     Impression:      Total calcium score (using FELICIANO calcium score calculator): 17.3  LAD 78.3  This is elevated and at the 87th percentile for matched population using multi ethnic study of atherosclerosis calcium calculator         Coronary CT angiogram     Severe misregistration artifacts decrease overall diagnostic accuracy of the test  No significant disease of left main coronary artery  Focal calcification of the mid left anterior " descending coronary artery without any obstructive disease  Mid left anterior descending coronary artery has an eccentric 30 to 40% stenosis  No significant disease of diagonal branch  No significant disease of left circumflex coronary artery or obtuse marginal branch  Right coronary artery does not seem to have any significant disease  Normal right posterior lateral artery  Normal right posterior descending coronary artery    I have personally reviewed labs, holter monitor, echo, CTA coronary and past office notes prior to patients visit  Assessment:          Diagnosis Plan   1. Paroxysmal SVT (supraventricular tachycardia)        2. Atherosclerosis of native coronary artery of native heart without angina pectoris        3. Essential hypertension        4. Acquired hypothyroidism        5. Family history of early CAD  ECG 12 Lead               Plan:       Paroxysmal SVT: Noted on recent holter monitor 4/2023. Palpitations remained controlled. Continue metoprolol.     2. Arthrosclerosis of coronary arteries: Discussed CTA coronary results with patient and mother in office. Normal LAD with mild focal stenosis of 30-40%, normal RCA, and no significant disease of left circumflex, right posterior lateral artery, diagonal branch and right posterior descending coronary artery. Chest pain sounds atypical. Discussed may be related to anxiety. Recommend to continue aspirin and follow up with PCP for other etiologies.     3. Hypertension: slightly elevated in office; reports better control at home. Continue current medications.     4. Hypothyroidism: managed and followed by PCP    5. Family history of early CAD    I attest that all portions of this note reviewed and all information has been updated by myself to reflect the patient's current status.      I spent 36 minutes caring for Frederick on this date of service. This time includes time spent by me in the following activities:preparing for the visit, reviewing tests,  obtaining and/or reviewing a separately obtained history, performing a medically appropriate examination and/or evaluation , counseling and educating the patient/family/caregiver, and documenting information in the medical record    I spent 2 minutes on the separately reported service of EKG. This time is not included in the time used to support the E/M service also reported today.    Patient is to follow up in 6 months or sooner if needed

## 2023-12-21 ENCOUNTER — OFFICE VISIT (OUTPATIENT)
Dept: CARDIOLOGY | Facility: CLINIC | Age: 58
End: 2023-12-21
Payer: MEDICARE

## 2023-12-21 VITALS
HEIGHT: 69 IN | DIASTOLIC BLOOD PRESSURE: 91 MMHG | WEIGHT: 165 LBS | BODY MASS INDEX: 24.44 KG/M2 | SYSTOLIC BLOOD PRESSURE: 142 MMHG | HEART RATE: 60 BPM

## 2023-12-21 DIAGNOSIS — I47.10 PAROXYSMAL SVT (SUPRAVENTRICULAR TACHYCARDIA): Primary | ICD-10-CM

## 2023-12-21 DIAGNOSIS — E03.9 ACQUIRED HYPOTHYROIDISM: ICD-10-CM

## 2023-12-21 DIAGNOSIS — I25.10 ATHEROSCLEROSIS OF NATIVE CORONARY ARTERY OF NATIVE HEART WITHOUT ANGINA PECTORIS: ICD-10-CM

## 2023-12-21 DIAGNOSIS — Z82.49 FAMILY HISTORY OF EARLY CAD: ICD-10-CM

## 2023-12-21 DIAGNOSIS — I10 ESSENTIAL HYPERTENSION: ICD-10-CM

## 2023-12-21 PROCEDURE — 93000 ELECTROCARDIOGRAM COMPLETE: CPT | Performed by: NURSE PRACTITIONER

## 2023-12-21 PROCEDURE — 99214 OFFICE O/P EST MOD 30 MIN: CPT | Performed by: NURSE PRACTITIONER

## 2023-12-21 PROCEDURE — 1160F RVW MEDS BY RX/DR IN RCRD: CPT | Performed by: NURSE PRACTITIONER

## 2023-12-21 PROCEDURE — 3080F DIAST BP >= 90 MM HG: CPT | Performed by: NURSE PRACTITIONER

## 2023-12-21 PROCEDURE — 1159F MED LIST DOCD IN RCRD: CPT | Performed by: NURSE PRACTITIONER

## 2023-12-21 PROCEDURE — 3077F SYST BP >= 140 MM HG: CPT | Performed by: NURSE PRACTITIONER

## 2023-12-21 RX ORDER — METOPROLOL SUCCINATE 50 MG/1
75 TABLET, EXTENDED RELEASE ORAL DAILY
Qty: 45 TABLET | Refills: 11 | Status: SHIPPED | OUTPATIENT
Start: 2023-12-21

## 2024-03-01 RX ORDER — TAMSULOSIN HYDROCHLORIDE 0.4 MG/1
1 CAPSULE ORAL 2 TIMES DAILY
Qty: 60 CAPSULE | Refills: 0 | OUTPATIENT
Start: 2024-03-01

## 2024-03-13 ENCOUNTER — TELEPHONE (OUTPATIENT)
Dept: UROLOGY | Facility: CLINIC | Age: 59
End: 2024-03-13
Payer: MEDICARE

## 2024-03-13 NOTE — TELEPHONE ENCOUNTER
Patient's mom called about patient getting scheduled for surgery in [place], she was requesting dates for surgery. I informed her I am unable to schedule/give any information about scheduling due to not working in that office

## 2024-04-17 ENCOUNTER — OFFICE VISIT (OUTPATIENT)
Dept: NEUROLOGY | Facility: CLINIC | Age: 59
End: 2024-04-17
Payer: MEDICARE

## 2024-04-17 VITALS
BODY MASS INDEX: 22.96 KG/M2 | DIASTOLIC BLOOD PRESSURE: 60 MMHG | OXYGEN SATURATION: 96 % | HEIGHT: 69 IN | SYSTOLIC BLOOD PRESSURE: 100 MMHG | HEART RATE: 72 BPM | WEIGHT: 155 LBS

## 2024-04-17 DIAGNOSIS — G44.40 MEDICATION OVERUSE HEADACHE: ICD-10-CM

## 2024-04-17 DIAGNOSIS — G44.229 CHRONIC TENSION-TYPE HEADACHE, NOT INTRACTABLE: Primary | ICD-10-CM

## 2024-04-17 PROCEDURE — 3078F DIAST BP <80 MM HG: CPT | Performed by: NURSE PRACTITIONER

## 2024-04-17 PROCEDURE — 3074F SYST BP LT 130 MM HG: CPT | Performed by: NURSE PRACTITIONER

## 2024-04-17 PROCEDURE — 99213 OFFICE O/P EST LOW 20 MIN: CPT | Performed by: NURSE PRACTITIONER

## 2024-04-17 PROCEDURE — 1159F MED LIST DOCD IN RCRD: CPT | Performed by: NURSE PRACTITIONER

## 2024-04-17 PROCEDURE — 1160F RVW MEDS BY RX/DR IN RCRD: CPT | Performed by: NURSE PRACTITIONER

## 2024-04-17 RX ORDER — PANTOPRAZOLE SODIUM 40 MG/1
40 TABLET, DELAYED RELEASE ORAL DAILY
COMMUNITY

## 2024-04-17 RX ORDER — DIVALPROEX SODIUM 500 MG/1
500 TABLET, EXTENDED RELEASE ORAL DAILY
Qty: 30 TABLET | Refills: 5 | Status: SHIPPED | OUTPATIENT
Start: 2024-04-17

## 2024-04-17 RX ORDER — METHYLPREDNISOLONE 4 MG/1
TABLET ORAL
Qty: 21 TABLET | Refills: 0 | Status: SHIPPED | OUTPATIENT
Start: 2024-04-17

## 2024-04-17 NOTE — ASSESSMENT & PLAN NOTE
After full assessment it does not appear that he has any migrainous like headaches.  He has what appears to be some tension headaches that present in the temples and on the vertex of his head at times.  These have an aching and pressure characteristic without any associated symptoms to be concerning for migraine.  His neurological examination is normal today.  He has previously had a CT of the head and MRI of the brain that was negative and I do not recommend any further neuroimaging at this time.  We will start Depakote 500 mg daily to help with his headache.  I did recommend stopping use of ibuprofen as I feel he is taking it more often than what he realizes and I am afraid he is in rebound headache as well.  They are understanding and agreeable with this.

## 2024-04-17 NOTE — ASSESSMENT & PLAN NOTE
He is taking ibuprofen at least 4-5 times per week although per his description it sounds like he may be taking this a little bit more than he realizes.  I do believe he has a component of medication overuse headache at this time.  I have recommended he stop all use of ibuprofen and other over-the-counter pain medications if possible.  We will send a steroid pack and start him on Depakote as well at this time.  All questions were answered and they are understanding.

## 2024-04-17 NOTE — PROGRESS NOTES
Neurology Consult Note    Referring Provider:   Edgar Gomez Jr., MD     Reason for Consultation:    Headache    Subjective   History of Present Illness:  Frederick Simms is a 59 y.o. male who presents today for headache.  He is routinely followed by Edgar Gomez Jr., MD for primary care.  He is present with his mother today.    He notes that he has been having headaches for a few years.  He notes that he will get a headache around 3-4 times per week.  He notes that the pain starts at the top of his head and feels like pressure and will sometimes go to the right temple area.  He denies any associated symptoms.  He will take Ibuprofen and has taken Goody Powder in the past.  He notes he will take it about 4-5 times per week but is not exactly sure of this.  He otherwise has no other questions or concerns.    Preventative Medications Tried:  Qulipta and Amitriptyline    Abortive Medications Tried:  None    Allergies:    Patient has no known allergies.    Medications:  Current Outpatient Medications   Medication Sig Dispense Refill    amitriptyline (ELAVIL) 50 MG tablet Take 1 tablet by mouth Every Night. 30 tablet 2    aspirin 81 MG EC tablet Take 1 tablet by mouth Daily.      fenofibrate 160 MG tablet Take 1 tablet by mouth Every Night.      levothyroxine (SYNTHROID, LEVOTHROID) 100 MCG tablet Take 1 tablet by mouth Daily.      meloxicam (MOBIC) 15 MG tablet Take 1 tablet by mouth Daily. 30 tablet 2    metoprolol succinate XL (TOPROL-XL) 50 MG 24 hr tablet Take 1.5 tablets by mouth Daily. 45 tablet 11    OLANZapine (zyPREXA) 10 MG tablet Take 1 tablet by mouth Every Night.      pantoprazole (PROTONIX) 40 MG EC tablet Take 1 tablet by mouth Daily.      polyethylene glycol (MIRALAX) 17 GM/SCOOP powder MIX 17 GRAMS OF POWDER IN 6-8 OUNCES IN DRINK OF CHOICE AND TAKE ONCE DAILY FOR CONSTIPATION      tamsulosin (FLOMAX) 0.4 MG capsule 24 hr capsule Take 1 capsule by mouth.      divalproex (DEPAKOTE ER) 500 MG  24 hr tablet Take 1 tablet by mouth Daily. 30 tablet 5    methylPREDNISolone (MEDROL) 4 MG dose pack Take as directed on package instructions. 21 tablet 0     Current Facility-Administered Medications   Medication Dose Route Frequency Provider Last Rate Last Admin    lidocaine (XYLOCAINE) 2% injection 5 mL  5 mL Injection Once Richard Beltran APRN         Current outpatient and discharge medications have been reconciled for the patient.  Reviewed by: BRITNEY Avalos    Past Medical History:  Past Medical History:   Diagnosis Date    Disease of thyroid gland     Elevated cholesterol     GERD (gastroesophageal reflux disease)     Hyperlipidemia     Hypertension     Irregular cardiac rhythm     Lipoma     Nephrolithiasis 6/18/2021     Past Surgical History:   Procedure Laterality Date    COLONOSCOPY N/A 6/9/2021    Procedure: COLONOSCOPY WITH ANESTHESIA;  Surgeon: Willian Bajwa DO;  Location: [facility] ENDOSCOPY;  Service: Gastroenterology;  Laterality: N/A;  preop; abdominal pain  postop; rectal ulcer   PCP Edgar Gomez     ENDOSCOPY N/A 12/16/2019    Procedure: ESOPHAGOGASTRODUODENOSCOPY WITH ANESTHESIA;  Surgeon: Willian Bajwa DO;  Location: [facility] ENDOSCOPY;  Service: Gastroenterology    EXTRACORPOREAL SHOCK WAVE LITHOTRIPSY (ESWL) Right 8/2/2021    Procedure: EXTRACORPOREAL SHOCKWAVE LITHOTRIPSY RIGHT;  Surgeon: Mega Muñoz MD;  Location: [facility] OR;  Service: Urology;  Laterality: Right;    GALLBLADDER SURGERY      LIPOMA EXCISION      TONSILLECTOMY      URETHRAL DILATATION N/A 5/4/2023    Procedure: CYSTOSCOPY, BALLOON URETHRAL DILATATION;  Surgeon: Mega Muñoz MD;  Location: [facility] OR;  Service: Urology;  Laterality: N/A;     Family History   Problem Relation Age of Onset    Hypertension Mother     Heart disease Mother     Stroke Father     No Known Problems Sister     No Known Problems Brother     No Known Problems Daughter     No Known Problems Son     Heart disease  "Maternal Grandmother     No Known Problems Paternal Grandmother     No Known Problems Maternal Aunt     No Known Problems Paternal Aunt     BRCA 1/2 Neg Hx     Breast cancer Neg Hx     Colon cancer Neg Hx     Endometrial cancer Neg Hx     Ovarian cancer Neg Hx     Colon polyps Neg Hx      Social History     Tobacco Use    Smoking status: Former     Current packs/day: 0.00     Average packs/day: 0.5 packs/day for 5.0 years (2.5 ttl pk-yrs)     Types: Cigarettes     Start date:      Quit date:      Years since quittin.3    Smokeless tobacco: Never    Tobacco comments:     34 YRS AGO   Vaping Use    Vaping status: Never Used   Substance Use Topics    Alcohol use: No    Drug use: No     Review of Systems   Neurological:  Positive for headache.         Objective   Vital Signs:  Heart Rate:  [72] 72  BP: (100)/(60) 100/60      24  0912   Weight: 70.3 kg (155 lb)     175.3 cm (69\")  Body mass index is 22.89 kg/m².    Physical Exam  Vitals reviewed.   Constitutional:       Appearance: Normal appearance.   HENT:      Head: Normocephalic.      Mouth/Throat:      Pharynx: Oropharynx is clear.   Eyes:      General: Lids are normal.      Extraocular Movements: Extraocular movements intact.      Pupils: Pupils are equal, round, and reactive to light.   Cardiovascular:      Rate and Rhythm: Normal rate and regular rhythm.      Pulses: Normal pulses.   Pulmonary:      Effort: Pulmonary effort is normal.   Musculoskeletal:         General: Normal range of motion.      Cervical back: Normal range of motion and neck supple.   Skin:     General: Skin is warm and dry.      Capillary Refill: Capillary refill takes less than 2 seconds.   Neurological:      Motor: Motor strength is normal.     Coordination: Coordination is intact.      Deep Tendon Reflexes: Reflexes are normal and symmetric.   Psychiatric:         Mood and Affect: Mood normal.         Speech: Speech normal.       Neurological Exam  Mental Status  Awake, " "alert and oriented to person, place and time. Recent and remote memory are intact. Speech is normal. Language is fluent with no aphasia. Attention and concentration are normal.    Cranial Nerves  CN II: Visual acuity is normal. Visual fields full to confrontation.  CN III, IV, VI: Extraocular movements intact bilaterally. Normal lids and orbits bilaterally. Pupils equal round and reactive to light bilaterally.  CN V: Facial sensation is normal.  CN VII: Full and symmetric facial movement.  CN IX, X: Palate elevates symmetrically. Normal gag reflex.  CN XI: Shoulder shrug strength is normal.  CN XII: Tongue midline without atrophy or fasciculations.    Motor   Strength is 5/5 throughout all four extremities.    Sensory  Sensation is intact to light touch, pinprick, vibration and proprioception in all four extremities.    Reflexes  Deep tendon reflexes are 2+ and symmetric in all four extremities.    Coordination    Finger-to-nose, rapid alternating movements and heel-to-shin normal bilaterally without dysmetria.    Gait  Normal casual, toe, heel and tandem gait.    Results Review:    Lab Results   Component Value Date    GLUCOSE 117 (H) 04/19/2023    BUN 13 04/19/2023    CREATININE 0.81 05/24/2023    EGFRIFNONA 116 11/16/2021    BCR 13.4 04/19/2023    K 3.7 04/19/2023    CO2 27.0 04/19/2023    CALCIUM 9.1 04/19/2023    ALBUMIN 4.3 04/19/2023    AST 22 04/19/2023    ALT 19 04/19/2023     Lab Results   Component Value Date    WBC 7.38 04/19/2023    HGB 13.4 04/19/2023    HCT 41.1 04/19/2023    MCV 90.3 04/19/2023     04/19/2023     Lab Results   Component Value Date    CHOL 170 11/27/2017    CHLPL 146 (L) 06/14/2018    TRIG 96 06/14/2018    HDL 47 (L) 06/14/2018    LDL 80 06/14/2018     Lab Results   Component Value Date    TSH 10.490 (H) 06/14/2018     No results found for: \"HGBA1C\"  No results found for: \"FOLATE\"  No results found for: \"JTYYWQBL80\"    MRI Brain Without Contrast (05/03/2019 15:53)   CT Head " Without Contrast (11/12/2018 10:25)     Chart Review:  PROGRESS NOTES - SCAN - [id number] (02/20/2024)      Plan   Diagnoses and all orders for this visit:    1. Chronic tension-type headache, not intractable (Primary)  Assessment & Plan:  After full assessment it does not appear that he has any migrainous like headaches.  He has what appears to be some tension headaches that present in the temples and on the vertex of his head at times.  These have an aching and pressure characteristic without any associated symptoms to be concerning for migraine.  His neurological examination is normal today.  He has previously had a CT of the head and MRI of the brain that was negative and I do not recommend any further neuroimaging at this time.  We will start Depakote 500 mg daily to help with his headache.  I did recommend stopping use of ibuprofen as I feel he is taking it more often than what he realizes and I am afraid he is in rebound headache as well.  They are understanding and agreeable with this.    Orders:  -     divalproex (DEPAKOTE ER) 500 MG 24 hr tablet; Take 1 tablet by mouth Daily.  Dispense: 30 tablet; Refill: 5  -     methylPREDNISolone (MEDROL) 4 MG dose pack; Take as directed on package instructions.  Dispense: 21 tablet; Refill: 0    2. Medication overuse headache  Assessment & Plan:  He is taking ibuprofen at least 4-5 times per week although per his description it sounds like he may be taking this a little bit more than he realizes.  I do believe he has a component of medication overuse headache at this time.  I have recommended he stop all use of ibuprofen and other over-the-counter pain medications if possible.  We will send a steroid pack and start him on Depakote as well at this time.  All questions were answered and they are understanding.    Orders:  -     divalproex (DEPAKOTE ER) 500 MG 24 hr tablet; Take 1 tablet by mouth Daily.  Dispense: 30 tablet; Refill: 5  -      methylPREDNISolone (MEDROL) 4 MG dose pack; Take as directed on package instructions.  Dispense: 21 tablet; Refill: 0    Follow-Up:  Return in about 2 months (around 6/17/2024) for Headache.         BRITNEY Avalos  04/17/24  10:16 CDT

## 2024-07-10 ENCOUNTER — OFFICE VISIT (OUTPATIENT)
Dept: CARDIOLOGY | Facility: CLINIC | Age: 59
End: 2024-07-10
Payer: MEDICARE

## 2024-07-10 VITALS
HEART RATE: 76 BPM | OXYGEN SATURATION: 98 % | HEIGHT: 69 IN | SYSTOLIC BLOOD PRESSURE: 111 MMHG | WEIGHT: 166 LBS | BODY MASS INDEX: 24.59 KG/M2 | DIASTOLIC BLOOD PRESSURE: 75 MMHG | RESPIRATION RATE: 18 BRPM

## 2024-07-10 DIAGNOSIS — I47.10 PAROXYSMAL SVT (SUPRAVENTRICULAR TACHYCARDIA): Primary | ICD-10-CM

## 2024-07-10 DIAGNOSIS — E03.9 ACQUIRED HYPOTHYROIDISM: ICD-10-CM

## 2024-07-10 DIAGNOSIS — I25.10 ATHEROSCLEROSIS OF NATIVE CORONARY ARTERY OF NATIVE HEART WITHOUT ANGINA PECTORIS: ICD-10-CM

## 2024-07-10 DIAGNOSIS — Z82.49 FAMILY HISTORY OF EARLY CAD: ICD-10-CM

## 2024-07-10 DIAGNOSIS — I10 ESSENTIAL HYPERTENSION: ICD-10-CM

## 2024-07-10 PROCEDURE — 1159F MED LIST DOCD IN RCRD: CPT | Performed by: NURSE PRACTITIONER

## 2024-07-10 PROCEDURE — 3074F SYST BP LT 130 MM HG: CPT | Performed by: NURSE PRACTITIONER

## 2024-07-10 PROCEDURE — 1160F RVW MEDS BY RX/DR IN RCRD: CPT | Performed by: NURSE PRACTITIONER

## 2024-07-10 PROCEDURE — 3078F DIAST BP <80 MM HG: CPT | Performed by: NURSE PRACTITIONER

## 2024-07-10 PROCEDURE — 99214 OFFICE O/P EST MOD 30 MIN: CPT | Performed by: NURSE PRACTITIONER

## 2024-07-10 NOTE — PROGRESS NOTES
Subjective:     Encounter Date: 07/10/2024      Patient ID: Frederick Simms is a 59 y.o. male with nonobstructive coronary artery disease, hypertension, hypothyroidism, family history of CAD.    Chief Complaint: follow up  History of Present Illness  Patient presents today for management of nonobstructive coronary artery disease. Today he reports that he is doing well. He has continued to have chronic chest pain. He reports episodes of heart racing or palpitations. Mother is in the office with him today. He reports that he has some heart racing episodes that occur 1-2 times a week. He reports that they last 1-2 minutes. Mother reports that her  has dementia and it is a stressful time in her house. He reports that he has some dyspnea on exertion present but it takes moderate exertion to produce. He denies any leg swelling. He denies any orthopnea or PND. Patient reports that his sleep has improved. He reports dizziness at times. He reports that it does occur with position change. He reports that his BP has been running 120-140/70-80. Patient follows with Dr Gomez as PCP.     The following portions of the patient's history were reviewed and updated as appropriate: allergies, current medications, past family history, past medical history, past social history, past surgical history and problem list.    No Known Allergies    Current Outpatient Medications:     amitriptyline (ELAVIL) 50 MG tablet, Take 1 tablet by mouth Every Night., Disp: 30 tablet, Rfl: 2    aspirin 81 MG EC tablet, Take 1 tablet by mouth Daily., Disp: , Rfl:     divalproex (DEPAKOTE ER) 500 MG 24 hr tablet, Take 1 tablet by mouth Daily., Disp: 30 tablet, Rfl: 5    fenofibrate 160 MG tablet, Take 1 tablet by mouth Every Night., Disp: , Rfl:     levothyroxine (SYNTHROID, LEVOTHROID) 100 MCG tablet, Take 1 tablet by mouth Daily., Disp: , Rfl:     meloxicam (MOBIC) 15 MG tablet, Take 1 tablet by mouth Daily., Disp: 30 tablet, Rfl: 2    metoprolol  succinate XL (TOPROL-XL) 50 MG 24 hr tablet, Take 1.5 tablets by mouth Daily., Disp: 45 tablet, Rfl: 11    OLANZapine (zyPREXA) 10 MG tablet, Take 1 tablet by mouth Every Night., Disp: , Rfl:     pantoprazole (PROTONIX) 40 MG EC tablet, Take 1 tablet by mouth Daily., Disp: , Rfl:     polyethylene glycol (MIRALAX) 17 GM/SCOOP powder, MIX 17 GRAMS OF POWDER IN 6-8 OUNCES IN DRINK OF CHOICE AND TAKE ONCE DAILY FOR CONSTIPATION, Disp: , Rfl:     tamsulosin (FLOMAX) 0.4 MG capsule 24 hr capsule, Take 1 capsule by mouth., Disp: , Rfl:     Current Facility-Administered Medications:     lidocaine (XYLOCAINE) 2% injection 5 mL, 5 mL, Injection, Once, Richard Beltran, BRITNEY  Past Medical History:   Diagnosis Date    Disease of thyroid gland     Elevated cholesterol     GERD (gastroesophageal reflux disease)     Hyperlipidemia     Hypertension     Irregular cardiac rhythm     Lipoma     Nephrolithiasis 2021     Social History     Socioeconomic History    Marital status: Single   Tobacco Use    Smoking status: Former     Current packs/day: 0.00     Average packs/day: 0.5 packs/day for 5.0 years (2.5 ttl pk-yrs)     Types: Cigarettes     Start date:      Quit date:      Years since quittin.5    Smokeless tobacco: Never    Tobacco comments:     34 YRS AGO   Vaping Use    Vaping status: Never Used   Substance and Sexual Activity    Alcohol use: No    Drug use: No    Sexual activity: Defer       Review of Systems   Constitutional: Negative for malaise/fatigue.   HENT:  Negative for nosebleeds.    Cardiovascular:  Positive for chest pain (chronic) and palpitations. Negative for dyspnea on exertion, irregular heartbeat, leg swelling, near-syncope, orthopnea, paroxysmal nocturnal dyspnea and syncope.   Respiratory:  Negative for shortness of breath.    Hematologic/Lymphatic: Does not bruise/bleed easily.   Genitourinary:  Negative for hematuria.   Neurological:  Positive for dizziness. Negative for weakness.  "  Psychiatric/Behavioral:  The patient is nervous/anxious.    All other systems reviewed and are negative.         Objective:     Vitals reviewed.   Constitutional:       General: Not in acute distress.     Appearance: Normal appearance. Well-developed.   Eyes:      Pupils: Pupils are equal, round, and reactive to light.   HENT:      Head: Normocephalic and atraumatic.      Nose: Nose normal.   Neck:      Vascular: No carotid bruit.   Pulmonary:      Effort: Pulmonary effort is normal. No respiratory distress.      Breath sounds: Normal breath sounds. No wheezing. No rales.   Cardiovascular:      Normal rate. Regular rhythm.      Murmurs: There is no murmur.   Edema:     Peripheral edema absent.   Abdominal:      General: There is no distension.      Palpations: Abdomen is soft.   Musculoskeletal: Normal range of motion.      Cervical back: Normal range of motion and neck supple. Skin:     General: Skin is warm.      Findings: No erythema or rash.   Neurological:      General: No focal deficit present.      Mental Status: Alert.   Psychiatric:         Attention and Perception: Attention normal.         Mood and Affect: Mood is anxious.         Speech: Speech normal.         /75   Pulse 76   Resp 18   Ht 175.3 cm (69\")   Wt 75.3 kg (166 lb)   SpO2 98%   BMI 24.51 kg/m²     Procedures    Lab Review:       Holter monitor 4/24/2023:  Conclusion:   Baseline rhythm was sinus.  No significant ectopy   9 runs of supraventricular tachycardia longest 16 beats at an average rate of 145 bpm  Most rapid SVT episode was 6 beats at a maximum rate of 193 bpm and average rate of 169 bpm at 4:01 PM    Results for orders placed during the hospital encounter of 05/24/23    Adult Transthoracic Echo Complete w/ Color, Spectral and Contrast if necessary per protocol    Interpretation Summary    Left ventricular systolic function is normal. Left ventricular ejection fraction appears to be 61 - 65%.    Left ventricular diastolic " function was normal.    Estimated right ventricular systolic pressure from tricuspid regurgitation is normal (<35 mmHg).    Normal global longitudinal LV strain (GLS) = -25.1%.    Lab Results   Component Value Date    CHOL 170 11/27/2017    CHLPL 146 (L) 06/14/2018    TRIG 96 06/14/2018    HDL 47 (L) 06/14/2018    LDL 80 06/14/2018       Cardiac CT angiography  5/24/2023     Impression:      Total calcium score (using FELICIANO calcium score calculator): 17.3  LAD 78.3  This is elevated and at the 87th percentile for matched population using multi ethnic study of atherosclerosis calcium calculator         Coronary CT angiogram     Severe misregistration artifacts decrease overall diagnostic accuracy of the test  No significant disease of left main coronary artery  Focal calcification of the mid left anterior descending coronary artery without any obstructive disease  Mid left anterior descending coronary artery has an eccentric 30 to 40% stenosis  No significant disease of diagonal branch  No significant disease of left circumflex coronary artery or obtuse marginal branch  Right coronary artery does not seem to have any significant disease  Normal right posterior lateral artery  Normal right posterior descending coronary artery    I have personally reviewed labs, holter monitor, echo, CTA coronary and past office notes prior to patients visit  Assessment:          Diagnosis Plan   1. Paroxysmal SVT (supraventricular tachycardia)        2. Atherosclerosis of native coronary artery of native heart without angina pectoris        3. Essential hypertension        4. Acquired hypothyroidism        5. Family history of early CAD                   Plan:       Paroxysmal SVT: Noted on recent holter monitor 4/2023. Palpitations remained controlled. Continue metoprolol.     2. Arthrosclerosis of coronary arteries: Discussed CTA coronary results with patient and mother in office. Normal LAD with mild focal stenosis of 30-40%, normal  RCA, and no significant disease of left circumflex, right posterior lateral artery, diagonal branch and right posterior descending coronary artery. Chest pain sounds atypical. Discussed may be related to anxiety. Recommend to continue aspirin.    3. Hypertension: well controlled in office. Continue current medications.     4. Hypothyroidism: managed and followed by PCP    5. Family history of early CAD    I attest that all portions of this note reviewed and all information has been updated by myself to reflect the patient's current status.      I spent 34 minutes caring for Frederick on this date of service. This time includes time spent by me in the following activities:preparing for the visit, reviewing tests, obtaining and/or reviewing a separately obtained history, performing a medically appropriate examination and/or evaluation , counseling and educating the patient/family/caregiver, and documenting information in the medical record    Patient is to follow up in 6 months or sooner if needed

## 2024-07-18 RX ORDER — TAMSULOSIN HYDROCHLORIDE 0.4 MG/1
1 CAPSULE ORAL 2 TIMES DAILY
Qty: 60 CAPSULE | Refills: 0 | OUTPATIENT
Start: 2024-07-18

## 2024-07-18 NOTE — TELEPHONE ENCOUNTER
HUB TO READ  Last seen in office regarding this medication 2/2023. Must be seen in office for refills.  Dr.David Gomez listed as PCP?

## 2024-07-22 ENCOUNTER — TELEPHONE (OUTPATIENT)
Dept: NEUROLOGY | Facility: CLINIC | Age: 59
End: 2024-07-22

## 2024-07-22 NOTE — TELEPHONE ENCOUNTER
Provider: ALEKS DUEÑAS     Caller: IRASEMA SALDIVAR    Relationship to Patient: MOTHER    Phone Number: [phone]    Reason for Call: PT'S MOTHER WOULD LIKE TO SCHEDULE A FU APPT FOR PT.  PLEASE CALL TO ADVISE.  SHE WOULD LIKE THIS SCHEDULED THE SAME DAY AS HIS FATHER'S APPT PLEASE.     THANK YOU

## 2024-12-16 ENCOUNTER — HOSPITAL ENCOUNTER (EMERGENCY)
Facility: HOSPITAL | Age: 59
Discharge: HOME | End: 2024-12-16
Attending: STUDENT IN AN ORGANIZED HEALTH CARE EDUCATION/TRAINING PROGRAM | Admitting: STUDENT IN AN ORGANIZED HEALTH CARE EDUCATION/TRAINING PROGRAM
Payer: MEDICARE

## 2024-12-16 VITALS
RESPIRATION RATE: 18 BRPM | WEIGHT: 166 LBS | DIASTOLIC BLOOD PRESSURE: 74 MMHG | BODY MASS INDEX: 26.06 KG/M2 | HEIGHT: 67 IN | OXYGEN SATURATION: 99 % | SYSTOLIC BLOOD PRESSURE: 126 MMHG | TEMPERATURE: 99.1 F | HEART RATE: 86 BPM

## 2024-12-16 DIAGNOSIS — L03.116 CELLULITIS OF LEFT LOWER EXTREMITY: Primary | ICD-10-CM

## 2024-12-16 PROCEDURE — 99283 EMERGENCY DEPT VISIT LOW MDM: CPT

## 2024-12-16 RX ORDER — CLINDAMYCIN HYDROCHLORIDE 150 MG/1
450 CAPSULE ORAL 3 TIMES DAILY
Qty: 63 CAPSULE | Refills: 0 | Status: SHIPPED | OUTPATIENT
Start: 2024-12-16 | End: 2024-12-19

## 2024-12-16 RX ORDER — ACETAMINOPHEN 325 MG/1
650 TABLET ORAL ONCE
Status: COMPLETED | OUTPATIENT
Start: 2024-12-16 | End: 2024-12-16

## 2024-12-16 RX ORDER — CLINDAMYCIN HYDROCHLORIDE 150 MG/1
450 CAPSULE ORAL ONCE
Status: COMPLETED | OUTPATIENT
Start: 2024-12-16 | End: 2024-12-16

## 2024-12-16 RX ADMIN — CLINDAMYCIN HYDROCHLORIDE 450 MG: 150 CAPSULE ORAL at 03:28

## 2024-12-16 RX ADMIN — ACETAMINOPHEN 650 MG: 325 TABLET ORAL at 03:28

## 2024-12-16 NOTE — ED PROVIDER NOTES
Subjective   History of Present Illness  This is a 59 y.o M with no significant past medical hx, coming in for atraumatic redness below left knee, warm to touch, no fever. No knee swelling, able to move extremity without problems. No constitutional symptoms. No hx of IV drug use.         Review of Systems   All other systems reviewed and are negative.      Past Medical History:   Diagnosis Date    Disease of thyroid gland     Elevated cholesterol     GERD (gastroesophageal reflux disease)     Hyperlipidemia     Hypertension     Irregular cardiac rhythm     Lipoma     Nephrolithiasis 6/18/2021       No Known Allergies    Past Surgical History:   Procedure Laterality Date    COLONOSCOPY N/A 6/9/2021    Procedure: COLONOSCOPY WITH ANESTHESIA;  Surgeon: Willian Bajwa DO;  Location: [facility] ENDOSCOPY;  Service: Gastroenterology;  Laterality: N/A;  preop; abdominal pain  postop; rectal ulcer   PCP Edgar Gomez     ENDOSCOPY N/A 12/16/2019    Procedure: ESOPHAGOGASTRODUODENOSCOPY WITH ANESTHESIA;  Surgeon: Willian Bajwa DO;  Location: [facility] ENDOSCOPY;  Service: Gastroenterology    EXTRACORPOREAL SHOCK WAVE LITHOTRIPSY (ESWL) Right 8/2/2021    Procedure: EXTRACORPOREAL SHOCKWAVE LITHOTRIPSY RIGHT;  Surgeon: Mega Muñoz MD;  Location: [facility] OR;  Service: Urology;  Laterality: Right;    GALLBLADDER SURGERY      LIPOMA EXCISION      TONSILLECTOMY      URETHRAL DILATATION N/A 5/4/2023    Procedure: CYSTOSCOPY, BALLOON URETHRAL DILATATION;  Surgeon: Mega Muñoz MD;  Location: [facility] OR;  Service: Urology;  Laterality: N/A;       Family History   Problem Relation Age of Onset    Hypertension Mother     Heart disease Mother     Stroke Father     No Known Problems Sister     No Known Problems Brother     No Known Problems Daughter     No Known Problems Son     Heart disease Maternal Grandmother     No Known Problems Paternal Grandmother     No Known Problems Maternal Aunt     No Known Problems  Paternal Aunt     BRCA 1/2 Neg Hx     Breast cancer Neg Hx     Colon cancer Neg Hx     Endometrial cancer Neg Hx     Ovarian cancer Neg Hx     Colon polyps Neg Hx        Social History     Socioeconomic History    Marital status: Single   Tobacco Use    Smoking status: Former     Current packs/day: 0.00     Average packs/day: 0.5 packs/day for 5.0 years (2.5 ttl pk-yrs)     Types: Cigarettes     Start date:      Quit date:      Years since quittin.9    Smokeless tobacco: Never    Tobacco comments:     34 YRS AGO   Vaping Use    Vaping status: Never Used   Substance and Sexual Activity    Alcohol use: No    Drug use: No    Sexual activity: Defer           Objective   Physical Exam  Constitutional:       General: He is not in acute distress.     Appearance: Normal appearance. He is not ill-appearing or toxic-appearing.   HENT:      Head: Normocephalic.      Mouth/Throat:      Pharynx: No oropharyngeal exudate.   Eyes:      Pupils: Pupils are equal, round, and reactive to light.   Cardiovascular:      Rate and Rhythm: Normal rate and regular rhythm.      Pulses: Normal pulses.   Pulmonary:      Effort: Pulmonary effort is normal. No respiratory distress.      Breath sounds: Normal breath sounds. No wheezing or rales.   Abdominal:      Palpations: Abdomen is soft.      Tenderness: There is no abdominal tenderness.   Musculoskeletal:         General: No swelling, tenderness, deformity or signs of injury. Normal range of motion.      Cervical back: Normal range of motion. No rigidity.      Right lower leg: No edema.      Comments: L knee normal ROM , no swelling, not warm to touch. Neurovascular intact.    Skin:     Capillary Refill: Capillary refill takes less than 2 seconds.      Comments: Below left knee there is erythema, warm to touch that blanches. Mild tenderness to palpation.    Neurological:      General: No focal deficit present.      Mental Status: He is alert and oriented to person, place, and  time.      Cranial Nerves: No cranial nerve deficit.         Procedures           ED Course                                                       Medical Decision Making  This is a 59 y.o M with no significant hx. Positive for pain below L knee near tibia tuberosity, atraumatic for 1 day. Knee joint intact, neurovascular intact, with no infection or redness. Below there is concerns for mild cellulitis, warm and tender to touch. Will treat with antibiotics, first dose given in the ED. Strict return precautions discussed for worsening symptoms. He is stable for discharge, pmd follow up.     Problems Addressed:  Cellulitis of left lower extremity: complicated acute illness or injury    Risk  OTC drugs.  Prescription drug management.        Final diagnoses:   Cellulitis of left lower extremity       ED Disposition  ED Disposition       ED Disposition   Discharge    Condition   Stable    Comment   --               Edgar Gomez Jr., MD  [address] [place] [id number] [phone]    In 1 week           Medication List        New Prescriptions      clindamycin 150 MG capsule  Commonly known as: CLEOCIN  Take 3 capsules by mouth 3 (Three) Times a Day.               Where to Get Your Medications        These medications were sent to [facility] [address] [place] [address] [address] [facility] - [phone] [facility] [phone] [address] [address]      Phone: [phone]   clindamycin 150 MG capsule            Pramod Johnson MD  12/18/24 1322

## 2024-12-19 ENCOUNTER — HOSPITAL ENCOUNTER (EMERGENCY)
Facility: HOSPITAL | Age: 59
Discharge: HOME | End: 2024-12-19
Attending: FAMILY MEDICINE
Payer: MEDICARE

## 2024-12-19 VITALS
WEIGHT: 169.5 LBS | HEART RATE: 93 BPM | OXYGEN SATURATION: 99 % | HEIGHT: 69 IN | TEMPERATURE: 98.2 F | DIASTOLIC BLOOD PRESSURE: 94 MMHG | SYSTOLIC BLOOD PRESSURE: 133 MMHG | BODY MASS INDEX: 25.1 KG/M2 | RESPIRATION RATE: 16 BRPM

## 2024-12-19 DIAGNOSIS — L03.116 CELLULITIS OF LEFT KNEE: Primary | ICD-10-CM

## 2024-12-19 LAB
ALBUMIN SERPL-MCNC: 4.4 G/DL (ref 3.5–5.2)
ALBUMIN/GLOB SERPL: 1.3 G/DL
ALP SERPL-CCNC: 48 U/L (ref 39–117)
ALT SERPL W P-5'-P-CCNC: 22 U/L (ref 1–41)
ANION GAP SERPL CALCULATED.3IONS-SCNC: 10 MMOL/L (ref 5–15)
AST SERPL-CCNC: 22 U/L (ref 1–40)
BASOPHILS # BLD AUTO: 0.03 10*3/MM3 (ref 0–0.2)
BASOPHILS NFR BLD AUTO: 0.3 % (ref 0–1.5)
BILIRUB SERPL-MCNC: 0.2 MG/DL (ref 0–1.2)
BUN SERPL-MCNC: 11 MG/DL (ref 6–20)
BUN/CREAT SERPL: 10.5 (ref 7–25)
CALCIUM SPEC-SCNC: 10.1 MG/DL (ref 8.6–10.5)
CHLORIDE SERPL-SCNC: 101 MMOL/L (ref 98–107)
CO2 SERPL-SCNC: 29 MMOL/L (ref 22–29)
CREAT SERPL-MCNC: 1.05 MG/DL (ref 0.76–1.27)
CRP SERPL-MCNC: 13.2 MG/DL (ref 0–0.5)
D-LACTATE SERPL-SCNC: 1.1 MMOL/L (ref 0.5–2)
DEPRECATED RDW RBC AUTO: 42.4 FL (ref 37–54)
EGFRCR SERPLBLD CKD-EPI 2021: 82.8 ML/MIN/1.73
EOSINOPHIL # BLD AUTO: 0.2 10*3/MM3 (ref 0–0.4)
EOSINOPHIL NFR BLD AUTO: 2.2 % (ref 0.3–6.2)
ERYTHROCYTE [DISTWIDTH] IN BLOOD BY AUTOMATED COUNT: 13.2 % (ref 12.3–15.4)
ERYTHROCYTE [SEDIMENTATION RATE] IN BLOOD: 25 MM/HR (ref 0–20)
GLOBULIN UR ELPH-MCNC: 3.4 GM/DL
GLUCOSE SERPL-MCNC: 100 MG/DL (ref 65–99)
HCT VFR BLD AUTO: 41.2 % (ref 37.5–51)
HGB BLD-MCNC: 14.1 G/DL (ref 13–17.7)
IMM GRANULOCYTES # BLD AUTO: 0.04 10*3/MM3 (ref 0–0.05)
IMM GRANULOCYTES NFR BLD AUTO: 0.4 % (ref 0–0.5)
LYMPHOCYTES # BLD AUTO: 1.61 10*3/MM3 (ref 0.7–3.1)
LYMPHOCYTES NFR BLD AUTO: 17.7 % (ref 19.6–45.3)
MAGNESIUM SERPL-MCNC: 2.2 MG/DL (ref 1.6–2.6)
MCH RBC QN AUTO: 30.1 PG (ref 26.6–33)
MCHC RBC AUTO-ENTMCNC: 34.2 G/DL (ref 31.5–35.7)
MCV RBC AUTO: 87.8 FL (ref 79–97)
MONOCYTES # BLD AUTO: 0.8 10*3/MM3 (ref 0.1–0.9)
MONOCYTES NFR BLD AUTO: 8.8 % (ref 5–12)
NEUTROPHILS NFR BLD AUTO: 6.43 10*3/MM3 (ref 1.7–7)
NEUTROPHILS NFR BLD AUTO: 70.6 % (ref 42.7–76)
NRBC BLD AUTO-RTO: 0 /100 WBC (ref 0–0.2)
PLATELET # BLD AUTO: 339 10*3/MM3 (ref 140–450)
PMV BLD AUTO: 9.4 FL (ref 6–12)
POTASSIUM SERPL-SCNC: 3.9 MMOL/L (ref 3.5–5.2)
PROT SERPL-MCNC: 7.8 G/DL (ref 6–8.5)
RBC # BLD AUTO: 4.69 10*6/MM3 (ref 4.14–5.8)
SODIUM SERPL-SCNC: 140 MMOL/L (ref 136–145)
URATE SERPL-MCNC: 3.1 MG/DL (ref 3.4–7)
VALPROATE SERPL-MCNC: <2.8 MCG/ML (ref 50–125)
WBC NRBC COR # BLD AUTO: 9.11 10*3/MM3 (ref 3.4–10.8)

## 2024-12-19 PROCEDURE — 96375 TX/PRO/DX INJ NEW DRUG ADDON: CPT

## 2024-12-19 PROCEDURE — 83605 ASSAY OF LACTIC ACID: CPT | Performed by: FAMILY MEDICINE

## 2024-12-19 PROCEDURE — 99283 EMERGENCY DEPT VISIT LOW MDM: CPT

## 2024-12-19 PROCEDURE — 83735 ASSAY OF MAGNESIUM: CPT | Performed by: FAMILY MEDICINE

## 2024-12-19 PROCEDURE — 96365 THER/PROPH/DIAG IV INF INIT: CPT

## 2024-12-19 PROCEDURE — 25010000002 VANCOMYCIN 10 G RECONSTITUTED SOLUTION: Performed by: FAMILY MEDICINE

## 2024-12-19 PROCEDURE — 87040 BLOOD CULTURE FOR BACTERIA: CPT | Performed by: FAMILY MEDICINE

## 2024-12-19 PROCEDURE — 36415 COLL VENOUS BLD VENIPUNCTURE: CPT

## 2024-12-19 PROCEDURE — 80164 ASSAY DIPROPYLACETIC ACD TOT: CPT | Performed by: FAMILY MEDICINE

## 2024-12-19 PROCEDURE — 84550 ASSAY OF BLOOD/URIC ACID: CPT | Performed by: FAMILY MEDICINE

## 2024-12-19 PROCEDURE — 85652 RBC SED RATE AUTOMATED: CPT | Performed by: FAMILY MEDICINE

## 2024-12-19 PROCEDURE — 80053 COMPREHEN METABOLIC PANEL: CPT | Performed by: FAMILY MEDICINE

## 2024-12-19 PROCEDURE — 25810000003 SODIUM CHLORIDE 0.9 % SOLUTION: Performed by: FAMILY MEDICINE

## 2024-12-19 PROCEDURE — 86140 C-REACTIVE PROTEIN: CPT | Performed by: FAMILY MEDICINE

## 2024-12-19 PROCEDURE — 85025 COMPLETE CBC W/AUTO DIFF WBC: CPT | Performed by: FAMILY MEDICINE

## 2024-12-19 PROCEDURE — 25010000002 DEXAMETHASONE PER 1 MG: Performed by: FAMILY MEDICINE

## 2024-12-19 RX ORDER — VANCOMYCIN/0.9 % SOD CHLORIDE 1.5G/250ML
20 PLASTIC BAG, INJECTION (ML) INTRAVENOUS ONCE
Status: COMPLETED | OUTPATIENT
Start: 2024-12-19 | End: 2024-12-19

## 2024-12-19 RX ORDER — SULFAMETHOXAZOLE AND TRIMETHOPRIM 800; 160 MG/1; MG/1
1 TABLET ORAL 2 TIMES DAILY
Qty: 20 TABLET | Refills: 0 | Status: SHIPPED | OUTPATIENT
Start: 2024-12-19 | End: 2024-12-29

## 2024-12-19 RX ORDER — SODIUM CHLORIDE 0.9 % (FLUSH) 0.9 %
10 SYRINGE (ML) INJECTION AS NEEDED
Status: DISCONTINUED | OUTPATIENT
Start: 2024-12-19 | End: 2024-12-20 | Stop reason: HOSPADM

## 2024-12-19 RX ORDER — DEXAMETHASONE SODIUM PHOSPHATE 10 MG/ML
10 INJECTION INTRAMUSCULAR; INTRAVENOUS ONCE
Status: COMPLETED | OUTPATIENT
Start: 2024-12-19 | End: 2024-12-19

## 2024-12-19 RX ADMIN — SODIUM CHLORIDE 1500 MG: 9 INJECTION, SOLUTION INTRAVENOUS at 21:51

## 2024-12-19 RX ADMIN — DEXAMETHASONE SODIUM PHOSPHATE 10 MG: 10 INJECTION INTRAMUSCULAR; INTRAVENOUS at 23:04

## 2024-12-24 LAB
BACTERIA SPEC AEROBE CULT: NORMAL
BACTERIA SPEC AEROBE CULT: NORMAL

## 2025-01-26 ENCOUNTER — HOSPITAL ENCOUNTER (EMERGENCY)
Facility: HOSPITAL | Age: 60
Discharge: HOME | End: 2025-01-26
Admitting: EMERGENCY MEDICINE
Payer: MEDICARE

## 2025-01-26 ENCOUNTER — APPOINTMENT (OUTPATIENT)
Dept: GENERAL RADIOLOGY | Facility: HOSPITAL | Age: 60
End: 2025-01-26
Payer: MEDICARE

## 2025-01-26 VITALS
TEMPERATURE: 98.9 F | HEIGHT: 69 IN | RESPIRATION RATE: 16 BRPM | HEART RATE: 98 BPM | SYSTOLIC BLOOD PRESSURE: 128 MMHG | WEIGHT: 165 LBS | OXYGEN SATURATION: 92 % | DIASTOLIC BLOOD PRESSURE: 82 MMHG | BODY MASS INDEX: 24.44 KG/M2

## 2025-01-26 DIAGNOSIS — J18.9 PNEUMONIA OF LEFT LUNG DUE TO INFECTIOUS ORGANISM, UNSPECIFIED PART OF LUNG: ICD-10-CM

## 2025-01-26 DIAGNOSIS — R05.1 ACUTE COUGH: ICD-10-CM

## 2025-01-26 DIAGNOSIS — J10.1 INFLUENZA A: Primary | ICD-10-CM

## 2025-01-26 LAB
B PARAPERT DNA SPEC QL NAA+PROBE: NOT DETECTED
B PERT DNA SPEC QL NAA+PROBE: NOT DETECTED
C PNEUM DNA NPH QL NAA+NON-PROBE: NOT DETECTED
FLUAV H1 2009 PAND RNA NPH QL NAA+PROBE: DETECTED
FLUBV RNA ISLT QL NAA+PROBE: NOT DETECTED
HADV DNA SPEC NAA+PROBE: NOT DETECTED
HCOV 229E RNA SPEC QL NAA+PROBE: NOT DETECTED
HCOV HKU1 RNA SPEC QL NAA+PROBE: NOT DETECTED
HCOV NL63 RNA SPEC QL NAA+PROBE: NOT DETECTED
HCOV OC43 RNA SPEC QL NAA+PROBE: NOT DETECTED
HMPV RNA NPH QL NAA+NON-PROBE: NOT DETECTED
HPIV1 RNA ISLT QL NAA+PROBE: NOT DETECTED
HPIV2 RNA SPEC QL NAA+PROBE: NOT DETECTED
HPIV3 RNA NPH QL NAA+PROBE: NOT DETECTED
HPIV4 P GENE NPH QL NAA+PROBE: NOT DETECTED
M PNEUMO IGG SER IA-ACNC: NOT DETECTED
RHINOVIRUS RNA SPEC NAA+PROBE: NOT DETECTED
RSV RNA NPH QL NAA+NON-PROBE: NOT DETECTED
SARS-COV-2 RNA RESP QL NAA+PROBE: NOT DETECTED

## 2025-01-26 PROCEDURE — 94640 AIRWAY INHALATION TREATMENT: CPT

## 2025-01-26 PROCEDURE — 94799 UNLISTED PULMONARY SVC/PX: CPT

## 2025-01-26 PROCEDURE — 99283 EMERGENCY DEPT VISIT LOW MDM: CPT

## 2025-01-26 PROCEDURE — 71046 X-RAY EXAM CHEST 2 VIEWS: CPT

## 2025-01-26 PROCEDURE — 25010000002 METHYLPREDNISOLONE PER 125 MG

## 2025-01-26 PROCEDURE — 0202U NFCT DS 22 TRGT SARS-COV-2: CPT

## 2025-01-26 PROCEDURE — 94761 N-INVAS EAR/PLS OXIMETRY MLT: CPT

## 2025-01-26 PROCEDURE — 96374 THER/PROPH/DIAG INJ IV PUSH: CPT

## 2025-01-26 RX ORDER — SODIUM CHLORIDE 0.9 % (FLUSH) 0.9 %
10 SYRINGE (ML) INJECTION AS NEEDED
Status: DISCONTINUED | OUTPATIENT
Start: 2025-01-26 | End: 2025-01-26 | Stop reason: HOSPADM

## 2025-01-26 RX ORDER — IPRATROPIUM BROMIDE AND ALBUTEROL SULFATE 2.5; .5 MG/3ML; MG/3ML
3 SOLUTION RESPIRATORY (INHALATION) ONCE
Status: COMPLETED | OUTPATIENT
Start: 2025-01-26 | End: 2025-01-26

## 2025-01-26 RX ORDER — METHYLPREDNISOLONE SODIUM SUCCINATE 125 MG/2ML
125 INJECTION, POWDER, LYOPHILIZED, FOR SOLUTION INTRAMUSCULAR; INTRAVENOUS ONCE
Status: COMPLETED | OUTPATIENT
Start: 2025-01-26 | End: 2025-01-26

## 2025-01-26 RX ORDER — OSELTAMIVIR PHOSPHATE 75 MG/1
75 CAPSULE ORAL 2 TIMES DAILY
Qty: 10 CAPSULE | Refills: 0 | Status: SHIPPED | OUTPATIENT
Start: 2025-01-26 | End: 2025-01-31

## 2025-01-26 RX ORDER — CEFDINIR 300 MG/1
300 CAPSULE ORAL 2 TIMES DAILY
Qty: 20 CAPSULE | Refills: 0 | Status: SHIPPED | OUTPATIENT
Start: 2025-01-26 | End: 2025-02-05

## 2025-01-26 RX ORDER — METHYLPREDNISOLONE 4 MG/1
TABLET ORAL
Qty: 21 TABLET | Refills: 0 | Status: SHIPPED | OUTPATIENT
Start: 2025-01-26

## 2025-01-26 RX ORDER — ALBUTEROL SULFATE 90 UG/1
2 INHALANT RESPIRATORY (INHALATION) EVERY 4 HOURS PRN
Qty: 18 G | Refills: 0 | Status: SHIPPED | OUTPATIENT
Start: 2025-01-26

## 2025-01-26 RX ADMIN — METHYLPREDNISOLONE SODIUM SUCCINATE 125 MG: 125 INJECTION, POWDER, FOR SOLUTION INTRAMUSCULAR; INTRAVENOUS at 11:47

## 2025-01-26 RX ADMIN — IPRATROPIUM BROMIDE AND ALBUTEROL SULFATE 3 ML: .5; 3 SOLUTION RESPIRATORY (INHALATION) at 11:39

## 2025-01-26 NOTE — ED PROVIDER NOTES
Subjective   History of Present Illness  Patient is a 59-year-old male who presents to the ED today complaining of cough and wheezing that has been going on for the last few days.  He denies any known sick contacts.  Denies shortness of breath or chest pain but states that his lungs feel tight.  He does report that he has had this feeling of his lungs being tight for several years now but his PCP was never made aware of this.  Denies any known history of asthma or COPD, is a non-smoker.  On exam there is some very mild expiratory wheezes noted posteriorly, no obvious signs of respiratory distress at this time.  Vital signs are reassuring, O2 sat is 100% on room air.  He is not tachypneic.  He does have a dry cough, nonproductive on exam.  PMH is significant for thyroid disease, GERD, hyperlipidemia, and hypertension.  Differential diagnoses: Viral URI, pneumonia, and other.    History provided by:  Patient   used: No        Review of Systems   Constitutional: Negative.    HENT: Negative.     Eyes: Negative.    Respiratory:  Positive for cough and wheezing. Negative for shortness of breath.    Cardiovascular: Negative.    Gastrointestinal: Negative.    Genitourinary: Negative.    Musculoskeletal: Negative.    Skin: Negative.    Neurological: Negative.    Psychiatric/Behavioral: Negative.         Past Medical History:   Diagnosis Date    Disease of thyroid gland     Elevated cholesterol     GERD (gastroesophageal reflux disease)     Hyperlipidemia     Hypertension     Irregular cardiac rhythm     Lipoma     Nephrolithiasis 06/18/2021       No Known Allergies    Past Surgical History:   Procedure Laterality Date    COLONOSCOPY N/A 6/9/2021    Procedure: COLONOSCOPY WITH ANESTHESIA;  Surgeon: Willian Bajwa DO;  Location[facility] ENDOSCOPY;  Service: Gastroenterology;  Laterality: N/A;  preop; abdominal pain  postop; rectal ulcer   PCP Edgar Gomez     ENDOSCOPY N/A 12/16/2019    Procedure:  ESOPHAGOGASTRODUODENOSCOPY WITH ANESTHESIA;  Surgeon: Willian Bajwa DO;  Location: [facility] ENDOSCOPY;  Service: Gastroenterology    EXTRACORPOREAL SHOCK WAVE LITHOTRIPSY (ESWL) Right 2021    Procedure: EXTRACORPOREAL SHOCKWAVE LITHOTRIPSY RIGHT;  Surgeon: Mega Muñoz MD;  Location: [facility] OR;  Service: Urology;  Laterality: Right;    GALLBLADDER SURGERY      LIPOMA EXCISION      TONSILLECTOMY      URETHRAL DILATATION N/A 2023    Procedure: CYSTOSCOPY, BALLOON URETHRAL DILATATION;  Surgeon: Mega Muñoz MD;  Location: [facility] OR;  Service: Urology;  Laterality: N/A;       Family History   Problem Relation Age of Onset    Hypertension Mother     Heart disease Mother     Stroke Father     No Known Problems Sister     No Known Problems Brother     No Known Problems Daughter     No Known Problems Son     Heart disease Maternal Grandmother     No Known Problems Paternal Grandmother     No Known Problems Maternal Aunt     No Known Problems Paternal Aunt     BRCA 1/2 Neg Hx     Breast cancer Neg Hx     Colon cancer Neg Hx     Endometrial cancer Neg Hx     Ovarian cancer Neg Hx     Colon polyps Neg Hx        Social History     Socioeconomic History    Marital status: Single   Tobacco Use    Smoking status: Former     Current packs/day: 0.00     Average packs/day: 0.5 packs/day for 5.0 years (2.5 ttl pk-yrs)     Types: Cigarettes     Start date:      Quit date:      Years since quittin.0    Smokeless tobacco: Never    Tobacco comments:     34 YRS AGO   Vaping Use    Vaping status: Never Used   Substance and Sexual Activity    Alcohol use: No    Drug use: No    Sexual activity: Defer       Objective   Physical Exam  Vitals and nursing note reviewed.   Constitutional:       General: He is not in acute distress.     Appearance: Normal appearance. He is not ill-appearing, toxic-appearing or diaphoretic.   HENT:      Head: Normocephalic and atraumatic.      Right Ear: External ear  normal.      Left Ear: External ear normal.      Nose: Nose normal.      Mouth/Throat:      Mouth: Mucous membranes are moist.   Eyes:      Extraocular Movements: Extraocular movements intact.      Conjunctiva/sclera: Conjunctivae normal.      Pupils: Pupils are equal, round, and reactive to light.   Cardiovascular:      Rate and Rhythm: Normal rate and regular rhythm.      Pulses: Normal pulses.      Heart sounds: Normal heart sounds.   Pulmonary:      Effort: Pulmonary effort is normal. No respiratory distress.      Breath sounds: No stridor. Wheezing present. No rhonchi or rales.      Comments: Mild expiratory wheezing noted posteriorly, dry nonproductive cough noted as well  Skin:     General: Skin is warm and dry.   Neurological:      Mental Status: He is alert and oriented to person, place, and time. Mental status is at baseline.      GCS: GCS eye subscore is 4. GCS verbal subscore is 5. GCS motor subscore is 6.   Psychiatric:         Mood and Affect: Mood normal.         Behavior: Behavior normal.         Thought Content: Thought content normal.         Judgment: Judgment normal.       Labs Reviewed   RESPIRATORY PANEL PCR W/ COVID-19 (SARS-COV-2), NP SWAB IN UTM/VTP, 2 HR TAT - Abnormal; Notable for the following components:       Result Value    Influenza A H1 2009 PCR Detected (*)     All other components within normal limits    Narrative:     In the setting of a positive respiratory panel with a viral infection PLUS a negative procalcitonin without other underlying concern for bacterial infection, consider observing off antibiotics or discontinuation of antibiotics and continue supportive care. If the respiratory panel is positive for atypical bacterial infection (Bordetella pertussis, Chlamydophila pneumoniae, or Mycoplasma pneumoniae), consider antibiotic de-escalation to target atypical bacterial infection.     XR Chest 2 View   Final Result   1. Lingular densities may relate to atelectasis/scarring or  possibly   pneumonia.           This report was signed and finalized on 1/26/2025 1:09 PM by Dr. Elvira Bedoya MD.            Medications   methylPREDNISolone sodium succinate (SOLU-Medrol) injection 125 mg (125 mg Intravenous Given 1/26/25 1147)   ipratropium-albuterol (DUO-NEB) nebulizer solution 3 mL (3 mL Nebulization Given 1/26/25 1139)     Procedures           ED Course  ED Course as of 01/27/25 2157   Sun Jan 26, 2025   1347 Patient has been observed for approximately 2 hours post breathing treatment, no rebound symptoms.  Symptoms have improved a little bit.  Respiratory panel is positive for flu A and his chest x-ray does reveal possible pneumonia.  I discussed with him that we will treat with Tamiflu and cefdinir.  I will also send steroids and an albuterol HFA for use at home as needed.  He feels comfortable following up outpatient with his PCP this week; return precautions given.  He is not hypoxic and is not requiring any supplemental O2. [HE]   1348 CURB-65 Score for Pneumonia Severity - MDCalc  Calculated on Jan 26 2025 2:48 PM  0 points -> Low risk group: 0.6% 30-day mortality. Consider outpatient treatment. [HE]      ED Course User Index  [HE] Liset Koch APRN                                                       Medical Decision Making  Patient is a 59-year-old male who presents to the ED today complaining of cough and wheezing that has been going on for the last few days.  He denies any known sick contacts.  Denies shortness of breath or chest pain but states that his lungs feel tight.  He does report that he has had this feeling of his lungs being tight for several years now but his PCP was never made aware of this.  Denies any known history of asthma or COPD, is a non-smoker.  On exam there is some very mild expiratory wheezes noted posteriorly, no obvious signs of respiratory distress at this time.  Vital signs are reassuring, O2 sat is 100% on room air.  He is not tachypneic.  He  does have a dry cough, nonproductive on exam.  PMH is significant for thyroid disease, GERD, hyperlipidemia, and hypertension.  Differential diagnoses: Viral URI, pneumonia, and other.    Pt was given IV steroids and a Duoneb breathing tx in the ED for symptom management. On re-eval wheezing has resolved, he feels symptoms have improved.     Respiratory panel is positive for Flu A.   CXR reveals lingular densities may relate to atelectasis/scarring or possibly pneumonia.     Results discussed at beside.   Patient has been observed for approximately 2 hours post breathing treatment, no rebound symptoms.  Symptoms have improved a little bit.  Respiratory panel is positive for flu A and his chest x-ray does reveal possible pneumonia.  I discussed with him that we will treat with Tamiflu and cefdinir.  I will also send steroids and an albuterol HFA for use at home as needed.  He feels comfortable following up outpatient with his PCP this week; return precautions given.  He is not hypoxic and is not requiring any supplemental O2. CURB-65 score is 0.  Pt discharged in stable condition.     Problems Addressed:  Acute cough: complicated acute illness or injury  Influenza A: complicated acute illness or injury  Pneumonia of left lung due to infectious organism, unspecified part of lung: complicated acute illness or injury    Amount and/or Complexity of Data Reviewed  Radiology: ordered.    Risk  Prescription drug management.        Final diagnoses:   Influenza A   Pneumonia of left lung due to infectious organism, unspecified part of lung   Acute cough       ED Disposition  ED Disposition       ED Disposition   Discharge    Condition   Stable    Comment   --               Edgar Gomez Jr., MD  [address] [place] [id number]  [phone]               Medication List        New Prescriptions      albuterol sulfate  (90 Base) MCG/ACT inhaler  Commonly known as: PROVENTIL HFA;VENTOLIN HFA;PROAIR HFA  Inhale 2  puffs Every 4 (Four) Hours As Needed for Wheezing.     cefdinir 300 MG capsule  Commonly known as: OMNICEF  Take 1 capsule by mouth 2 (Two) Times a Day for 10 days.     methylPREDNISolone 4 MG dose pack  Commonly known as: MEDROL  Take as directed on package instructions.     oseltamivir 75 MG capsule  Commonly known as: Tamiflu  Take 1 capsule by mouth 2 (Two) Times a Day for 5 days.               Where to Get Your Medications        These medications were sent to [facility] [address] [place] [address] [address] [facility] [phone] [place] [phone] [address] [address]      Phone: [phone]   albuterol sulfate  (90 Base) MCG/ACT inhaler  cefdinir 300 MG capsule  methylPREDNISolone 4 MG dose pack  oseltamivir 75 MG capsule            Liset Koch, APRN  01/27/25 4431

## 2025-01-26 NOTE — DISCHARGE INSTRUCTIONS
Today you are seen in the ED for your symptoms.  We have discussed the results of your labs and imaging at the bedside which reveals that you are positive for influenza A, you were also noted to have pneumonia.  I am sending in Tamiflu for the flu and cefdinir for the pneumonia.  I am sending steroids and an inhaler for symptoms as well.  Your oxygen levels have been normal and you are not requiring any supplemental oxygen, therefore you are safe to follow-up on an outpatient basis.  Please follow-up with your PCP this week and return to the ED with any new or worsening symptoms.

## 2025-01-26 NOTE — Clinical Note
[facility] EMERGENCY DEPARTMENT  [address]  [facility] [address]  Phone: [phone]    Frederick Simms was seen and treated in our emergency department on 1/26/2025.  He may return to work on 01/28/2025.         Thank you for choosing [facility].    Liset Koch, APRN

## 2025-01-26 NOTE — Clinical Note
[facility] EMERGENCY DEPARTMENT  [address]  [facility] [address]  Phone: [phone]    Frederick Simms was seen and treated in our emergency department on 1/26/2025.  He may return to work on 01/28/2025.         Thank you for choosing [facility].    Liset Koch, APRN

## (undated) DEVICE — TBG SMPL FLTR LINE NASL 02/C02 A/ BX/100

## (undated) DEVICE — ENDOGATOR AUXILIARY WATER JET CONNECTOR: Brand: ENDOGATOR

## (undated) DEVICE — BHS TURNOVER CYSTO: Brand: MEDLINE INDUSTRIES, INC.

## (undated) DEVICE — ENDO KIT,LOURDES HOSPITAL: Brand: MEDLINE INDUSTRIES, INC.

## (undated) DEVICE — FRCP BX RADJAW4 NDL 2.8 240 STD OG

## (undated) DEVICE — CONMED SCOPE SAVER BITE BLOCK, 20X27 MM: Brand: SCOPE SAVER

## (undated) DEVICE — SENSR O2 OXIMAX FNGR A/ 18IN NONSTR

## (undated) DEVICE — YANKAUER,BULB TIP WITH VENT: Brand: ARGYLE

## (undated) DEVICE — THE CHANNEL CLEANING BRUSH IS A NYLON FLEXI BRUSH ATTACHED TO A FLEXIBLE PLASTIC SHEATH DESIGNED TO SAFELY REMOVE DEBRIS FROM FLEXIBLE ENDOSCOPES.

## (undated) DEVICE — MASK,OXYGEN,MED CONC,ADLT,7' TUB, UC: Brand: PENDING

## (undated) DEVICE — PK CYSTO 30

## (undated) DEVICE — GLV SURG BIOGEL M LTX PF 7 1/2

## (undated) DEVICE — CUFF,BP,DISP,1 TUBE,ADULT,HP: Brand: MEDLINE

## (undated) DEVICE — GW SENSR DUALFLEX NITNL STR .038IN 3X150CM

## (undated) DEVICE — Device: Brand: DEFENDO AIR/WATER/SUCTION AND BIOPSY VALVE

## (undated) DEVICE — BALLOON DILATATION CATHETER KIT: Brand: UROMAX ULTRA KIT

## (undated) DEVICE — FORCEPS BX L240CM DIA2.4MM L NDL RAD JAW 4 133340

## (undated) DEVICE — ST DIL URETH 8TO24F